# Patient Record
Sex: FEMALE | Race: BLACK OR AFRICAN AMERICAN | Employment: OTHER | URBAN - METROPOLITAN AREA
[De-identification: names, ages, dates, MRNs, and addresses within clinical notes are randomized per-mention and may not be internally consistent; named-entity substitution may affect disease eponyms.]

---

## 2017-01-23 ENCOUNTER — OFFICE VISIT (OUTPATIENT)
Dept: INTERNAL MEDICINE CLINIC | Age: 62
End: 2017-01-23

## 2017-01-23 VITALS
HEIGHT: 60 IN | HEART RATE: 55 BPM | DIASTOLIC BLOOD PRESSURE: 79 MMHG | SYSTOLIC BLOOD PRESSURE: 128 MMHG | BODY MASS INDEX: 37.54 KG/M2 | WEIGHT: 191.2 LBS | TEMPERATURE: 98.1 F | OXYGEN SATURATION: 93 %

## 2017-01-23 DIAGNOSIS — E66.01 MORBID OBESITY DUE TO EXCESS CALORIES (HCC): ICD-10-CM

## 2017-01-23 DIAGNOSIS — R91.1 PULMONARY NODULE, RIGHT: ICD-10-CM

## 2017-01-23 DIAGNOSIS — B18.2 CHRONIC HEPATITIS C WITHOUT HEPATIC COMA (HCC): ICD-10-CM

## 2017-01-23 DIAGNOSIS — I10 ESSENTIAL HYPERTENSION: Primary | ICD-10-CM

## 2017-01-23 RX ORDER — MOMETASONE FUROATE 50 UG/1
2 SPRAY, METERED NASAL DAILY
Qty: 1 CONTAINER | Refills: 11 | Status: SHIPPED | OUTPATIENT
Start: 2017-01-23 | End: 2019-12-05 | Stop reason: CLARIF

## 2017-01-23 RX ORDER — BISMUTH SUBSALICYLATE 262 MG
1 TABLET,CHEWABLE ORAL DAILY
COMMUNITY

## 2017-01-23 NOTE — PROGRESS NOTES
SPORTS MEDICINE AND PRIMARY CARE  Pb Rojas MD, [address] [id number]  Phone:  [phone]  Fax: [phone]       Chief Complaint   Patient presents with    Well Woman   . SUBJECTIVE:    Raúl Orellana is a 57 y.o. female Patient returns today alert and appropriate and has the capacity to give an accurate history. She has a known history of hepatitis C, primary hypertension, obesity, prediabetes, and right pulmonary nodules. Gynecologist Dr. Edith Espinoza performed a pap smear back in June. She currently complains of some hoarseness which she has had since Friday. She had a cold. 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 Current Outpatient Prescriptions   Medication Sig Dispense Refill    CYANOCOBALAMIN, VITAMIN B-12, (VITAMIN B-12 PO) Take 1 Tab by mouth daily.  multivitamin (ONE A DAY) tablet Take 1 Tab by mouth daily.  VITAMIN A PO Take 1 Tab by mouth daily.  mometasone (NASONEX) 50 mcg/actuation nasal spray 2 Sprays by Both Nostrils route daily. 1 Container 11    Hydrochlorothiazide (HYDRODIURIL) 12.5 mg tablet TAKE 1 TABLET BY MOUTH DAILY 30 Tab 11    ergocalciferol (ERGOCALCIFEROL) 50,000 unit capsule Take 1 Cap by mouth every seven (7) days.  8 Cap 0     Past Medical History   Diagnosis Date    H/O gastric bypass 2003     md roshni    Hepatitis C      rx ended 2008,2014 viral load non detected, syl luna md    Hypertension     Normal cardiac stress test 6-2-06    Obesity     Plantar fasciitis of right foot     Prediabetes     Pulmonary nodule, right 11-22-15  /  6-2-16     7mm  - repeat 6 mo  stable repeat 6/2/17    S/P colonoscopy 2010     Past Surgical History   Procedure Laterality Date    Hx colonoscopy      Hx gyn       Allergies   Allergen Reactions    Codeine Hives         REVIEW OF SYSTEMS:  General: negative for - chills or fever  ENT: negative for - headaches, nasal congestion or tinnitus  Respiratory: negative for - cough, hemoptysis, shortness of breath or wheezing  Cardiovascular : negative for - chest pain, edema, palpitations or shortness of breath  Gastrointestinal: negative for - abdominal pain, blood in stools, heartburn or nausea/vomiting  Genito-Urinary: no dysuria, trouble voiding, or hematuria  Musculoskeletal: negative for - gait disturbance, joint pain, joint stiffness or joint swelling  Neurological: no TIA or stroke symptoms  Hematologic: no bruises, no bleeding, no swollen glands  Integument: no lumps, mole changes, nail changes or rash  Endocrine: no malaise/lethargy or unexpected weight changes      Social History     Social History    Marital status:      Spouse name: N/A    Number of children: N/A    Years of education: N/A     Social History Main Topics    Smoking status: Never Smoker    Smokeless tobacco: Never Used    Alcohol use 1.0 oz/week     2 Glasses of wine per week    Drug use: No    Sexual activity: Yes     Partners: Male     Other Topics Concern    None     Social History Narrative    Medical History: hepatitis C - how urbano luna mdUpper pole renal cystGastroesophageal reflux diseaseVenous stasis ulcersHistory of phlebitisJoe    joint disease kneeShe had endometriosisObesitynegative stress Cardiolite 2006 ejection fraction 62%Family history ovarian    cancerSummer 2013 ophthalmological evaluation    Gyn History: Last pap date 2014. Surgical History: arthroscopic long limb gastric bypass w ith Tom-en-Y gastrojejunostomy md bryn 2003colonoscopy     Hospitalization/Major Diagnostic Procedure: Denies Past Hospitalization    Family History: Mother:  79 yrs Ovarian cancer w ith metastatic disease Father: alive Sister(s): alive Brother(s): alive Daughter(s):    alive Son(s): alive 3 brother(s) , 1 sister(s) . 1 son(s) , 1 daughter(s) .     Social History: Alcohol Use Patient does not use alcohol. Smoking Status Patient is a never smoker. Marital Status: W idow , W idow . Lives    w ith: alone. Occupation/W ork: employed full time teacher. Education/School: has highschool diploma, has college diploma [employer]. Family History   Problem Relation Age of Onset    Cancer Mother        OBJECTIVE:    Visit Vitals    /79 (BP 1 Location: Left arm, BP Patient Position: Sitting)  Comment (BP 1 Location): patient requested that bp be taken at wrist.    Pulse (!) 55    Temp 98.1 °F (36.7 °C) (Oral)    Ht 5' (1.524 m)    Wt 191 lb 3.2 oz (86.7 kg)    SpO2 93%    BMI 37.34 kg/m2     CONSTITUTIONAL: well , well nourished, appears age appropriate  EYES: perrla, eom intact  ENMT:moist mucous membranes, pharynx clear  NECK: supple. Thyroid normal  RESPIRATORY: Chest: clear bilaterally   CARDIOVASCULAR: Heart: regular rate and rhythm  GASTROINTESTINAL: Abdomen: soft, bowel sounds active  HEMATOLOGIC: no pathological lymph nodes palpated  MUSCULOSKELETAL: Extremities: no edema, pulse 1+   INTEGUMENT: No unusual rashes or suspicious skin lesions noted. Nails appear normal.  NEUROLOGIC: non-focal exam   MENTAL STATUS: alert and oriented, appropriate affect           ASSESSMENT:  1. Essential hypertension    2. Chronic hepatitis C without hepatic coma ([address]    3. Morbid obesity due to excess calories ([address]    4. Pulmonary nodule, right      From a medical perspective her wellness exam is stable. Some items we need to tidy up however. For the hoarseness we give her a steroid nasal spray which should allow the inflammation to quiet down. If she develops mucopurulent drainage she will contact us. Blood pressure control is at goal and we are very pleased with that result. Her BMI is 37.34 which represents morbid obesity and more importantly represents a nine pound weight loss.   We encourage her to continue her current weight loss program.  To that end we encourage her to do 30 minutes of physical activity five days a week or more. She had an episode of right upper quadrant abdominal discomfort. When she had her surgical procedure performed she did not have her gallbladder removed. It was a very uncomfortable feeling. She had a CT scan of her abdomen in 2015 that was notable for the nodule that were found. Comments regarding the gallbladder revealed no calcified gallstones. The ultrasound of the abdomen in June of 2015 was unremarkable and she had abdominal pain at that time and specifically there were no gallstones and normal gallbladder and wall thickening. There was no tenderness on palpation. I do not think it would be worthwhile to repeat the episode at this point and we just continue to observe. This may have been some scar tissue that caused her to have the discomfort. Advanced care planning is discussed and noted above. Appropriate laboratory studies will be requested and will be sent to her in the mail. PLAN:  .  Orders Placed This Encounter    CT CHEST WO CONT    MIGUEL MAMMO BI SCREENING INCL CAD    CBC WITH AUTOMATED DIFF    METABOLIC PANEL, COMPREHENSIVE    URINALYSIS W/ RFLX MICROSCOPIC    LIPID PANEL    TSH 3RD GENERATION    HEMOGLOBIN A1C WITH EAG    OCCULT BLOOD, IMMUNOASSAY (FIT)    HIV 1/2 AG/AB, 4TH GENERATION,W RFLX CONFIRM    HEPATITIS C QT BY PCR WITH REFLEX GENOTYPE    CYANOCOBALAMIN, VITAMIN B-12, (VITAMIN B-12 PO)    multivitamin (ONE A DAY) tablet    VITAMIN A PO    mometasone (NASONEX) 50 mcg/actuation nasal spray       Follow-up Disposition:  Return in about 6 months (around 7/23/2017). ATTENTION:   This medical record was transcribed using an electronic medical records system. Although proofread, it may and can contain electronic and spelling errors. Other human spelling and other errors may be present. Corrections may be executed at a later time.   Please feel free to contact us for any clarifications as needed.

## 2017-01-23 NOTE — PROGRESS NOTES
Chief Complaint   Patient presents with    Well Woman     1. Have you been to the ER, urgent care clinic since your last visit? Hospitalized since your last visit? No    2. Have you seen or consulted any other health care providers outside of the [address] [place] [address] since your last visit? Include any pap smears or colon screening.  No

## 2017-01-23 NOTE — MR AVS SNAPSHOT
Visit Information Date & Time Provider Department Dept. Phone Encounter #  
 1/23/2017  3:45 PM Marlene Rojo MD [facility] Medicine and Primary Care [phone] [id number] Follow-up Instructions Return in about 6 months (around 7/23/2017). Follow-up and Disposition History Your Appointments 7/10/2017  4:15 PM  
Any with Marlene Rojo MD  
[address] [place] [address] and Primary Care [facility] Appt Note: 6 month visit  
 Justin Tobias [address] [facility]  
  
   
 Justin [address]ias [address] [id number] Upcoming Health Maintenance Date Due Pneumococcal 19-64 Medium Risk (1 of 1 - PPSV23) 10/25/1973 DTaP/Tdap/Td series (1 - Tdap) 10/25/1975 INFLUENZA AGE 9 TO ADULT 8/1/2016 FOBT Q 1 YEAR AGE 50-75 12/1/2016 BREAST CANCER SCRN MAMMOGRAM 2/1/2018 PAP AKA CERVICAL CYTOLOGY 12/1/2018 Allergies as of 1/23/2017  Review Complete On: 1/23/2017 By: Marlene Rojo MD  
  
 Severity Noted Reaction Type Reactions Codeine  12/23/2014   Side Effect Hives Current Immunizations  Never Reviewed No immunizations on file. Not reviewed this visit You Were Diagnosed With   
  
 Codes Comments Essential hypertension    -  Primary ICD-10-CM: I10 
ICD-9-CM: 401.9 Chronic hepatitis C without hepatic coma (HCC)     ICD-10-CM: B18.2 ICD-9-CM: 070.54 Morbid obesity due to excess calories ([facility] [address]     ICD-10-CM: E66.01 
ICD-9-CM: 278.01   
 Pulmonary nodule, right     ICD-10-CM: R91.1 ICD-9-CM: 793.11 Vitals BP Pulse Temp Height(growth percentile) Weight(growth percentile) SpO2  
 128/79 (BP 1 Location: Left arm, BP Patient Position: Sitting) (!) 55 98.1 °F (36.7 °C) (Oral) 5' (1.524 m) 191 lb 3.2 oz (86.7 kg) 93% BMI OB Status Smoking Status 37.34 kg/m2 Postmenopausal Never Smoker Vitals History BMI and BSA Data Body Mass Index Body Surface Area 37.34 kg/m 2 1.92 m 2 [phone] Your Updated Medication List  
  
   
This list is accurate as of: 17  4:54 PM.  Always use your most recent med list.  
  
  
  
  
 ergocalciferol 50,000 unit capsule Commonly known as:  ERGOCALCIFEROL Take 1 Cap by mouth every seven (7) days. hydroCHLOROthiazide 12.5 mg tablet Commonly known as:  HYDRODIURIL  
TAKE 1 TABLET BY MOUTH DAILY  
  
 mometasone 50 mcg/actuation nasal spray Commonly known as:  NASONEX  
2 Sprays by Both Nostrils route daily. multivitamin tablet Commonly known as:  ONE A DAY Take 1 Tab by mouth daily. VITAMIN A PO Take 1 Tab by mouth daily. VITAMIN B-12 PO Take 1 Tab by mouth daily. Prescriptions Sent to Pharmacy Refills  
 mometasone (NASONEX) 50 mcg/actuation nasal spray 11 Si Sprays by Both Nostrils route daily. Class: Normal  
 Pharmacy: [facility] [address] Ph #: [phone] Route: Both Nostrils We Performed the Following CBC WITH AUTOMATED DIFF [76761 CPT(R)] HEMOGLOBIN A1C WITH EAG [07480 CPT(R)] HEPATITIS C QT BY PCR WITH REFLEX GENOTYPE [[id number] Custom] HIV 1/2 AG/AB, 4TH GENERATION,W RFLX CONFIRM [[id number] Custom] LIPID PANEL [96512 CPT(R)] METABOLIC PANEL, COMPREHENSIVE [25269 CPT(R)] OCCULT BLOOD, IMMUNOASSAY (FIT) [id number] CPT(R)] TX COLLECTION VENOUS BLOOD,VENIPUNCTURE [id number] CPT(R)] TSH 3RD GENERATION [48082 CPT(R)] URINALYSIS W/ RFLX MICROSCOPIC [41233 CPT(R)] Follow-up Instructions Return in about 6 months (around 2017). To-Do List   
 2017 Imaging:  MIGUEL MAMMO BI SCREENING INCL CAD   
  
 2017 Imaging:  CT CHEST WO CONT Introducing [facility] & HEALTH SERVICES! [facility] introduces [employer] patient portal. Now you can access parts of your medical record, email your doctor's office, and request medication refills online. 1. In your internet browser, go to [email or web address] 2. Click on the First Time User? Click Here link in the Sign In box. You will see the New Member Sign Up page. 3. Enter your [employer] Access Code exactly as it appears below. You will not need to use this code after youve completed the sign-up process. If you do not sign up before the expiration date, you must request a new code. · [employer] Access Code: [email or web address] Expires: 4/23/2017  4:53 PM 
 
4. Enter the last four digits of your Social Security Number (xxxx) and Date of Birth (mm/dd/yyyy) as indicated and click Submit. You will be taken to the next sign-up page. 5. Create a [employer] ID. This will be your [employer] login ID and cannot be changed, so think of one that is secure and easy to remember. 6. Create a [employer] password. You can change your password at any time. 7. Enter your Password Reset Question and Answer. This can be used at a later time if you forget your password. 8. Enter your e-mail address. You will receive e-mail notification when new information is available in [address] 9. Click Sign Up. You can now view and download portions of your medical record. 10. Click the Download Summary menu link to download a portable copy of your medical information. If you have questions, please visit the Frequently Asked Questions section of the [employer] website. Remember, [employer] is NOT to be used for urgent needs. For medical emergencies, dial 911. Now available from your iPhone and Android! Please provide this summary of care documentation to your next provider. Your primary care clinician is listed as Raulito Yanes. If you have any questions after today's visit, please call [phone].

## 2017-01-26 LAB
ALBUMIN SERPL-MCNC: 4.2 G/DL (ref 3.6–4.8)
ALBUMIN/GLOB SERPL: 1.4 {RATIO} (ref 1.1–2.5)
ALP SERPL-CCNC: 108 IU/L (ref 39–117)
ALT SERPL-CCNC: 9 IU/L (ref 0–32)
APPEARANCE UR: ABNORMAL
AST SERPL-CCNC: 20 IU/L (ref 0–40)
BACTERIA #/AREA URNS HPF: ABNORMAL /[HPF]
BASOPHILS # BLD AUTO: 0 X10E3/UL (ref 0–0.2)
BASOPHILS NFR BLD AUTO: 0 %
BILIRUB SERPL-MCNC: <0.2 MG/DL (ref 0–1.2)
BILIRUB UR QL STRIP: NEGATIVE
BUN SERPL-MCNC: 14 MG/DL (ref 8–27)
BUN/CREAT SERPL: 18 (ref 11–26)
CALCIUM SERPL-MCNC: 9.2 MG/DL (ref 8.7–10.3)
CASTS URNS QL MICRO: ABNORMAL /LPF
CHLORIDE SERPL-SCNC: 99 MMOL/L (ref 96–106)
CHOLEST SERPL-MCNC: 181 MG/DL (ref 100–199)
CO2 SERPL-SCNC: 22 MMOL/L (ref 18–29)
COLOR UR: YELLOW
CREAT SERPL-MCNC: 0.79 MG/DL (ref 0.57–1)
CRYSTALS URNS MICRO: ABNORMAL
EOSINOPHIL # BLD AUTO: 0.1 X10E3/UL (ref 0–0.4)
EOSINOPHIL NFR BLD AUTO: 2 %
EPI CELLS #/AREA URNS HPF: ABNORMAL /HPF
ERYTHROCYTE [DISTWIDTH] IN BLOOD BY AUTOMATED COUNT: 14.9 % (ref 12.3–15.4)
EST. AVERAGE GLUCOSE BLD GHB EST-MCNC: 123 MG/DL
GLOBULIN SER CALC-MCNC: 2.9 G/DL (ref 1.5–4.5)
GLUCOSE SERPL-MCNC: 87 MG/DL (ref 65–99)
GLUCOSE UR QL: NEGATIVE
HBA1C MFR BLD: 5.9 % (ref 4.8–5.6)
HCT VFR BLD AUTO: 40.3 % (ref 34–46.6)
HCV GENOTYPE: NORMAL
HCV RNA SERPL NAA+PROBE-ACNC: NORMAL IU/ML
HCV RNA SERPL NAA+PROBE-LOG IU: NORMAL LOG10 IU/ML
HDLC SERPL-MCNC: 60 MG/DL
HGB BLD-MCNC: 12.8 G/DL (ref 11.1–15.9)
HGB UR QL STRIP: ABNORMAL
HIV 1+2 AB+HIV1 P24 AG SERPL QL IA: NON REACTIVE
IMM GRANULOCYTES # BLD: 0 X10E3/UL (ref 0–0.1)
IMM GRANULOCYTES NFR BLD: 0 %
KETONES UR QL STRIP: NEGATIVE
LDLC SERPL CALC-MCNC: 98 MG/DL (ref 0–99)
LEUKOCYTE ESTERASE UR QL STRIP: ABNORMAL
LYMPHOCYTES # BLD AUTO: 2.4 X10E3/UL (ref 0.7–3.1)
LYMPHOCYTES NFR BLD AUTO: 33 %
MCH RBC QN AUTO: 26.1 PG (ref 26.6–33)
MCHC RBC AUTO-ENTMCNC: 31.8 G/DL (ref 31.5–35.7)
MCV RBC AUTO: 82 FL (ref 79–97)
MICRO URNS: ABNORMAL
MONOCYTES # BLD AUTO: 0.4 X10E3/UL (ref 0.1–0.9)
MONOCYTES NFR BLD AUTO: 6 %
MUCOUS THREADS URNS QL MICRO: PRESENT
NEUTROPHILS # BLD AUTO: 4.4 X10E3/UL (ref 1.4–7)
NEUTROPHILS NFR BLD AUTO: 59 %
NITRITE UR QL STRIP: NEGATIVE
PH UR STRIP: 5 [PH] (ref 5–7.5)
PLATELET # BLD AUTO: 279 X10E3/UL (ref 150–379)
POTASSIUM SERPL-SCNC: 4.5 MMOL/L (ref 3.5–5.2)
PROT SERPL-MCNC: 7.1 G/DL (ref 6–8.5)
PROT UR QL STRIP: NEGATIVE
RBC # BLD AUTO: 4.9 X10E6/UL (ref 3.77–5.28)
RBC #/AREA URNS HPF: ABNORMAL /HPF
SODIUM SERPL-SCNC: 141 MMOL/L (ref 134–144)
SP GR UR: 1.03 (ref 1–1.03)
TEST INFORMATION: NORMAL
TRIGL SERPL-MCNC: 115 MG/DL (ref 0–149)
TSH SERPL DL<=0.005 MIU/L-ACNC: 1.8 UIU/ML (ref 0.45–4.5)
UNIDENT CRYS URNS QL MICRO: PRESENT
UROBILINOGEN UR STRIP-MCNC: 0.2 MG/DL (ref 0.2–1)
VLDLC SERPL CALC-MCNC: 23 MG/DL (ref 5–40)
WBC # BLD AUTO: 7.4 X10E3/UL (ref 3.4–10.8)
WBC #/AREA URNS HPF: ABNORMAL /HPF

## 2017-02-17 RX ORDER — HYDROCHLOROTHIAZIDE 12.5 MG/1
TABLET ORAL
Qty: 30 TAB | Refills: 0 | Status: SHIPPED | OUTPATIENT
Start: 2017-02-17 | End: 2017-03-16 | Stop reason: SDUPTHER

## 2017-02-22 ENCOUNTER — HOSPITAL ENCOUNTER (OUTPATIENT)
Dept: MAMMOGRAPHY | Age: 62
Discharge: HOME | End: 2017-02-22
Attending: INTERNAL MEDICINE
Payer: COMMERCIAL

## 2017-02-22 DIAGNOSIS — I10 ESSENTIAL HYPERTENSION: ICD-10-CM

## 2017-02-22 PROCEDURE — 77067 SCR MAMMO BI INCL CAD: CPT

## 2017-03-16 RX ORDER — HYDROCHLOROTHIAZIDE 12.5 MG/1
TABLET ORAL
Qty: 30 TAB | Refills: 0 | Status: SHIPPED | OUTPATIENT
Start: 2017-03-16 | End: 2017-06-11 | Stop reason: SDUPTHER

## 2017-05-04 ENCOUNTER — LAB ONLY (OUTPATIENT)
Dept: INTERNAL MEDICINE CLINIC | Age: 62
End: 2017-05-04

## 2017-05-04 DIAGNOSIS — R53.83 FATIGUE, UNSPECIFIED TYPE: Primary | ICD-10-CM

## 2017-05-06 LAB — PTH-INTACT SERPL-MCNC: 74 PG/ML (ref 15–65)

## 2017-05-08 LAB
25(OH)D3+25(OH)D2 SERPL-MCNC: 15.4 NG/ML (ref 30–100)
ALBUMIN SERPL-MCNC: 4 G/DL (ref 3.6–4.8)
ALBUMIN/GLOB SERPL: 1.5 {RATIO} (ref 1.2–2.2)
ALP SERPL-CCNC: 98 IU/L (ref 39–117)
ALT SERPL-CCNC: 11 IU/L (ref 0–32)
AST SERPL-CCNC: 18 IU/L (ref 0–40)
BASOPHILS # BLD AUTO: 0 X10E3/UL (ref 0–0.2)
BASOPHILS NFR BLD AUTO: 0 %
BILIRUB SERPL-MCNC: <0.2 MG/DL (ref 0–1.2)
BUN SERPL-MCNC: 19 MG/DL (ref 8–27)
BUN/CREAT SERPL: 19 (ref 12–28)
CALCIUM SERPL-MCNC: 9.3 MG/DL (ref 8.7–10.3)
CHLORIDE SERPL-SCNC: 103 MMOL/L (ref 96–106)
CO2 SERPL-SCNC: 25 MMOL/L (ref 18–29)
CREAT SERPL-MCNC: 1.02 MG/DL (ref 0.57–1)
EOSINOPHIL # BLD AUTO: 0.1 X10E3/UL (ref 0–0.4)
EOSINOPHIL NFR BLD AUTO: 2 %
ERYTHROCYTE [DISTWIDTH] IN BLOOD BY AUTOMATED COUNT: 15.3 % (ref 12.3–15.4)
EST. AVERAGE GLUCOSE BLD GHB EST-MCNC: 120 MG/DL
FERRITIN SERPL-MCNC: 23 NG/ML (ref 15–150)
FOLATE SERPL-MCNC: 12.9 NG/ML
GLOBULIN SER CALC-MCNC: 2.6 G/DL (ref 1.5–4.5)
GLUCOSE SERPL-MCNC: 90 MG/DL (ref 65–99)
H PYLORI IGM SER-ACNC: <9 UNITS (ref 0–8.9)
HBA1C MFR BLD: 5.8 % (ref 4.8–5.6)
HCT VFR BLD AUTO: 39.6 % (ref 34–46.6)
HGB BLD-MCNC: 12.7 G/DL (ref 11.1–15.9)
IMM GRANULOCYTES # BLD: 0 X10E3/UL (ref 0–0.1)
IMM GRANULOCYTES NFR BLD: 0 %
IRON SATN MFR SERPL: 11 % (ref 15–55)
IRON SERPL-MCNC: 39 UG/DL (ref 27–139)
LYMPHOCYTES # BLD AUTO: 2.6 X10E3/UL (ref 0.7–3.1)
LYMPHOCYTES NFR BLD AUTO: 39 %
MCH RBC QN AUTO: 25.7 PG (ref 26.6–33)
MCHC RBC AUTO-ENTMCNC: 32.1 G/DL (ref 31.5–35.7)
MCV RBC AUTO: 80 FL (ref 79–97)
MONOCYTES # BLD AUTO: 0.4 X10E3/UL (ref 0.1–0.9)
MONOCYTES NFR BLD AUTO: 5 %
NEUTROPHILS # BLD AUTO: 3.5 X10E3/UL (ref 1.4–7)
NEUTROPHILS NFR BLD AUTO: 54 %
PLATELET # BLD AUTO: 246 X10E3/UL (ref 150–379)
POTASSIUM SERPL-SCNC: 4.3 MMOL/L (ref 3.5–5.2)
PREALB SERPL-MCNC: 20 MG/DL (ref 10–36)
PROT SERPL-MCNC: 6.6 G/DL (ref 6–8.5)
RBC # BLD AUTO: 4.94 X10E6/UL (ref 3.77–5.28)
SODIUM SERPL-SCNC: 142 MMOL/L (ref 134–144)
TIBC SERPL-MCNC: 347 UG/DL (ref 250–450)
TSH SERPL DL<=0.005 MIU/L-ACNC: 1.41 UIU/ML (ref 0.45–4.5)
UIBC SERPL-MCNC: 308 UG/DL (ref 118–369)
VIT B12 SERPL-MCNC: 349 PG/ML (ref 211–946)
WBC # BLD AUTO: 6.6 X10E3/UL (ref 3.4–10.8)

## 2017-05-08 RX ORDER — ERGOCALCIFEROL 1.25 MG/1
50000 CAPSULE ORAL
Qty: 8 CAP | Refills: 1 | Status: SHIPPED | OUTPATIENT
Start: 2017-05-08 | End: 2018-07-10 | Stop reason: ALTCHOICE

## 2017-07-10 ENCOUNTER — OFFICE VISIT (OUTPATIENT)
Dept: INTERNAL MEDICINE CLINIC | Age: 62
End: 2017-07-10

## 2017-07-10 VITALS
DIASTOLIC BLOOD PRESSURE: 92 MMHG | OXYGEN SATURATION: 99 % | HEIGHT: 60 IN | BODY MASS INDEX: 37.93 KG/M2 | HEART RATE: 50 BPM | TEMPERATURE: 96.3 F | WEIGHT: 193.2 LBS | SYSTOLIC BLOOD PRESSURE: 130 MMHG | RESPIRATION RATE: 16 BRPM

## 2017-07-10 DIAGNOSIS — N28.1 RENAL CYST, RIGHT: ICD-10-CM

## 2017-07-10 DIAGNOSIS — R91.1 PULMONARY NODULE, RIGHT: ICD-10-CM

## 2017-07-10 DIAGNOSIS — B18.2 CHRONIC HEPATITIS C WITHOUT HEPATIC COMA (HCC): ICD-10-CM

## 2017-07-10 DIAGNOSIS — R73.03 PREDIABETES: ICD-10-CM

## 2017-07-10 DIAGNOSIS — I10 ESSENTIAL HYPERTENSION: Primary | ICD-10-CM

## 2017-07-10 DIAGNOSIS — Z98.84 H/O GASTRIC BYPASS: ICD-10-CM

## 2017-07-10 NOTE — MR AVS SNAPSHOT
Visit Information Date & Time Provider Department Dept. Phone Encounter #  
 7/10/2017  4:15 PM Rhoda Castillo [facility] Sports Medicine and Primary Care [phone] [id number] Follow-up Instructions Return in about 6 months (around 1/10/2018). Follow-up and Disposition History Upcoming Health Maintenance Date Due Pneumococcal 19-64 Medium Risk (1 of 1 - PPSV23) 10/25/1973 DTaP/Tdap/Td series (1 - Tdap) 10/25/1975 FOBT Q 1 YEAR AGE 50-75 12/1/2016 INFLUENZA AGE 9 TO ADULT 8/1/2017 PAP AKA CERVICAL CYTOLOGY 12/1/2018 BREAST CANCER SCRN MAMMOGRAM 2/22/2019 Allergies as of 7/10/2017  Review Complete On: 7/10/2017 By: Tuan Pete MD  
  
 Severity Noted Reaction Type Reactions Codeine  12/23/2014   Side Effect Hives Current Immunizations  Never Reviewed No immunizations on file. Not reviewed this visit You Were Diagnosed With   
  
 Codes Comments Essential hypertension    -  Primary ICD-10-CM: I10 
ICD-9-CM: 401.9 Chronic hepatitis C without hepatic coma (HCC)     ICD-10-CM: B18.2 ICD-9-CM: 070.54 Pulmonary nodule, right     ICD-10-CM: R91.1 ICD-9-CM: 793.11   
 H/O gastric bypass     ICD-10-CM: Z98.890 ICD-9-CM: V45.86 Prediabetes     ICD-10-CM: R73.03 
ICD-9-CM: 790.29 Renal cyst, right     ICD-10-CM: N28.1 ICD-9-CM: 753.10 Vitals BP Pulse Temp Resp Height(growth percentile) Weight(growth percentile) (!) 130/92 (BP 1 Location: Left arm, BP Patient Position: Sitting) (!) 50 96.3 °F (35.7 °C) (Oral) 16 5' (1.524 m) 193 lb 3.2 oz (87.6 kg) SpO2 BMI OB Status Smoking Status 99% 37.73 kg/m2 Postmenopausal Never Smoker BMI and BSA Data Body Mass Index Body Surface Area  
 37.73 kg/m 2 1.93 m 2 Preferred Pharmacy Pharmacy Name Phone [facility] [phone] [place] [address] [phone] Your Updated Medication List  
  
   
This list is accurate as of: 7/10/17  4:53 PM.  Always use your most recent med list.  
  
  
  
  
 ergocalciferol 50,000 unit capsule Commonly known as:  ERGOCALCIFEROL Take 1 Cap by mouth every seven (7) days. hydroCHLOROthiazide 12.5 mg tablet Commonly known as:  HYDRODIURIL  
TAKE 1 TABLET BY MOUTH EVERY DAY  
  
 mometasone 50 mcg/actuation nasal spray Commonly known as:  NASONEX  
2 Sprays by Both Nostrils route daily. multivitamin tablet Commonly known as:  ONE A DAY Take 1 Tab by mouth daily. varicella zoster vacine live 19,400 unit/0.65 mL Susr injection Commonly known as:  ZOSTAVAX  
1 Vial by SubCUTAneous route once for 1 dose. VITAMIN A PO Take 1 Tab by mouth daily. VITAMIN B-12 PO Take 1 Tab by mouth daily. Prescriptions Sent to Pharmacy Refills  
 varicella zoster vacine live (ZOSTAVAX) 19,400 unit/0.65 mL susr injection 0 Si Vial by SubCUTAneous route once for 1 dose. Class: Normal  
 Pharmacy: [facility] [address] #: [phone] Route: SubCUTAneous Follow-up Instructions Return in about 6 months (around 1/10/2018). To-Do List   
 07/10/2017 Imaging:  CT CHEST WO CONT   
  
 07/10/2017 Imaging:  US RETROPERITONEUM COMP Introducing [facility] & HEALTH SERVICES! [facility] introduces [email or web address] patient portal. Now you can access parts of your medical record, email your doctor's office, and request medication refills online. 1. In your internet browser, go to [email or web address] 2. Click on the First Time User? Click Here link in the Sign In box. You will see the New Member Sign Up page. 3. Enter your [email or web address] Access Code exactly as it appears below. You will not need to use this code after youve completed the sign-up process.  If you do not sign up before the expiration date, you must request a new code. · [employer] Access Code: [email or web address] Expires: 10/8/2017  4:53 PM 
 
4. Enter the last four digits of your Social Security Number (xxxx) and Date of Birth (mm/dd/yyyy) as indicated and click Submit. You will be taken to the next sign-up page. 5. Create a [employer] ID. This will be your [employer] login ID and cannot be changed, so think of one that is secure and easy to remember. 6. Create a [employer] password. You can change your password at any time. 7. Enter your Password Reset Question and Answer. This can be used at a later time if you forget your password. 8. Enter your e-mail address. You will receive e-mail notification when new information is available in [address] 9. Click Sign Up. You can now view and download portions of your medical record. 10. Click the Download Summary menu link to download a portable copy of your medical information. If you have questions, please visit the Frequently Asked Questions section of the [employer] website. Remember, [employer] is NOT to be used for urgent needs. For medical emergencies, dial 911. Now available from your iPhone and Android! Please provide this summary of care documentation to your next provider. Your primary care clinician is listed as Hazel Parnell. If you have any questions after today's visit, please call [phone].

## 2017-07-10 NOTE — PROGRESS NOTES
1. Have you been to the ER, urgent care clinic since your last visit? Hospitalized since your last visit? No    2. Have you seen or consulted any other health care providers outside of the [address] [place] [address] since your last visit? Include any pap smears or colon screening.  No

## 2017-07-10 NOTE — PROGRESS NOTES
SPORTS MEDICINE AND PRIMARY CARE  Richard Sethi MD, [address] [facility],[address] [address]  Phone:  [phone]  Fax: [phone]       Chief Complaint   Patient presents with    Follow-up     6 month visit    . SUBJECTIVE:    Jeff Shane is a 57 y.o. female Patient returns today ambulatory, alert and appropriate and has the capacity to give an accurate history. She has a known history of hepatitis C treated with nondetectable viral load 2014, primary hypertension, status-post Tom-en-Y gastric bypass 2003 by Ceci Lauren MD and stable right pulmonary nodule. CT scan on 6/216 suggested a repeat CT on 12/1. Patient has no new complaints. She wants her wellness form completed when she has it available. She would also like the status of her renal cyst as well as the pulmonary nodules. Patient is seen for evaluation. Current Outpatient Prescriptions   Medication Sig Dispense Refill    varicella zoster vacine live (ZOSTAVAX) 19,400 unit/0.65 mL susr injection 1 Vial by SubCUTAneous route once for 1 dose. 0.65 mL 0    hydroCHLOROthiazide (HYDRODIURIL) 12.5 mg tablet TAKE 1 TABLET BY MOUTH EVERY DAY 90 Tab 11    ergocalciferol (ERGOCALCIFEROL) 50,000 unit capsule Take 1 Cap by mouth every seven (7) days. 8 Cap 1    CYANOCOBALAMIN, VITAMIN B-12, (VITAMIN B-12 PO) Take 1 Tab by mouth daily.  multivitamin (ONE A DAY) tablet Take 1 Tab by mouth daily.  VITAMIN A PO Take 1 Tab by mouth daily.  mometasone (NASONEX) 50 mcg/actuation nasal spray 2 Sprays by Both Nostrils route daily.  1 Container 11     Past Medical History:   Diagnosis Date    H/O gastric bypass 2003    md roshni    Hepatitis C     rx ended 2008,2014 viral load non detected, syl luna md    Hypertension     Normal cardiac stress test 6-2-06    Obesity     Plantar fasciitis of right foot     Prediabetes     Pulmonary nodule, right 11-22-15  /  6-2-16    7mm  - repeat 6 mo stable repeat 6/2/17    Renal cyst, right     S/P colonoscopy 2010     Past Surgical History:   Procedure Laterality Date    HX COLONOSCOPY      HX GYN       Allergies   Allergen Reactions    Codeine Hives         REVIEW OF SYSTEMS:  General: negative for - chills or fever  ENT: negative for - headaches, nasal congestion or tinnitus  Respiratory: negative for - cough, hemoptysis, shortness of breath or wheezing  Cardiovascular : negative for - chest pain, edema, palpitations or shortness of breath  Gastrointestinal: negative for - abdominal pain, blood in stools, heartburn or nausea/vomiting  Genito-Urinary: no dysuria, trouble voiding, or hematuria  Musculoskeletal: negative for - gait disturbance, joint pain, joint stiffness or joint swelling  Neurological: no TIA or stroke symptoms  Hematologic: no bruises, no bleeding, no swollen glands  Integument: no lumps, mole changes, nail changes or rash  Endocrine: no malaise/lethargy or unexpected weight changes      Social History     Social History    Marital status:      Spouse name: N/A    Number of children: N/A    Years of education: N/A     Social History Main Topics    Smoking status: Never Smoker    Smokeless tobacco: Never Used    Alcohol use 1.0 oz/week     2 Glasses of wine per week    Drug use: No    Sexual activity: Yes     Partners: Male     Other Topics Concern    None     Social History Narrative    Medical History: hepatitis C - how urbano luna mdUpper pole renal cystGastroesophageal reflux diseaseVenous stasis ulcersHistory of phlebitisJoe    joint disease kneeShe had endometriosisObesitynegative stress Cardiolite June 8, 2006 ejection fraction 62%Family history ovarian    cancerSummer 2013 ophthalmological evaluation    Gyn History: Last pap date 01/13/2014.     Surgical History: arthroscopic long limb gastric bypass w ith Tom-en-Y gastrojejunostomy md bryn April 9, 2003colonoscopy 2010    Hospitalization/Major Diagnostic Procedure: Denies Past Hospitalization    Family History: Mother:  79 yrs Ovarian cancer w ith metastatic disease Father: alive Sister(s): alive Brother(s): alive Daughter(s):    alive Son(s): alive 3 brother(s) , 1 sister(s) . 1 son(s) , 1 daughter(s) . Social History: Alcohol Use Patient does not use alcohol. Smoking Status Patient is a never smoker. Marital Status: W idow , W idow . Lives    w ith: alone. Occupation/W ork: employed full time teacher. Education/School: has highschool diploma, has college diploma [employer]. Family History   Problem Relation Age of Onset    Cancer Mother        OBJECTIVE:    Visit Vitals    BP (!) 130/92 (BP 1 Location: Left arm, BP Patient Position: Sitting)    Pulse (!) 50    Temp 96.3 °F (35.7 °C) (Oral)    Resp 16    Ht 5' (1.524 m)    Wt 193 lb 3.2 oz (87.6 kg)    SpO2 99%    BMI 37.73 kg/m2     CONSTITUTIONAL: well , well nourished, appears age appropriate  EYES: perrla, eom intact  ENMT:moist mucous membranes, pharynx clear  NECK: supple. Thyroid normal  RESPIRATORY: Chest: clear bilaterally   CARDIOVASCULAR: Heart: regular rate and rhythm  GASTROINTESTINAL: Abdomen: soft, bowel sounds active  HEMATOLOGIC: no pathological lymph nodes palpated  MUSCULOSKELETAL: Extremities: no edema, pulse 1+   INTEGUMENT: No unusual rashes or suspicious skin lesions noted. Nails appear normal.  NEUROLOGIC: non-focal exam   MENTAL STATUS: alert and oriented, appropriate affect           ASSESSMENT:  1. Essential hypertension    2. Chronic hepatitis C without hepatic coma (HCC)    3. Pulmonary nodule, right    4. H/O gastric bypass    5. Prediabetes    6. Renal cyst, right      Patient's medical status is stable. We note a diastolic of 92 at the upper limits of normal.  In addition we note that the BMI reflects a two pound weight gain suggesting that she is trying to get back into the [employer] club.   We encourage physical activity for 30 minutes five days a week and a heart healthy, low salt diet which would allow her diastolic to return towards normal and allow her weight gain to dissipate. We will ask for a follow-up renal ultrasound to confirm stability of the cyst on her right kidney. We will ask for repeat CT scan to confirm stability of her pulmonary nodules noted previously that had been previously exhibiting benign characteristics. Patient's medical status is stable. She will return to see us in six months, sooner if she has any problems. PLAN:  .  Orders Placed This Encounter    CT CHEST WO CONT    US RETROPERITONEUM COMP    varicella zoster vacine live (ZOSTAVAX) 19,400 unit/0.65 mL susr injection       Follow-up Disposition:  Return in about 6 months (around 1/10/2018). ATTENTION:   This medical record was transcribed using an electronic medical records system. Although proofread, it may and can contain electronic and spelling errors. Other human spelling and other errors may be present. Corrections may be executed at a later time. Please feel free to contact us for any clarifications as needed.

## 2017-07-17 ENCOUNTER — HOSPITAL ENCOUNTER (OUTPATIENT)
Dept: ULTRASOUND IMAGING | Age: 62
Discharge: HOME | End: 2017-07-17
Attending: INTERNAL MEDICINE
Payer: COMMERCIAL

## 2017-07-17 ENCOUNTER — HOSPITAL ENCOUNTER (OUTPATIENT)
Dept: CT IMAGING | Age: 62
Discharge: HOME | End: 2017-07-17
Attending: INTERNAL MEDICINE
Payer: COMMERCIAL

## 2017-07-17 DIAGNOSIS — N28.1 RENAL CYST, RIGHT: ICD-10-CM

## 2017-07-17 DIAGNOSIS — R91.1 PULMONARY NODULE, RIGHT: ICD-10-CM

## 2017-07-17 PROCEDURE — 71250 CT THORAX DX C-: CPT

## 2017-07-17 PROCEDURE — 76770 US EXAM ABDO BACK WALL COMP: CPT

## 2017-07-19 PROBLEM — Z12.11 ENCOUNTER FOR HEMOCCULT SCREENING: Status: ACTIVE | Noted: 2017-07-19

## 2017-07-19 LAB
HEMOCCULT STL QL IA: NEGATIVE
PLEASE NOTE:, 188601: NORMAL

## 2018-01-09 ENCOUNTER — OFFICE VISIT (OUTPATIENT)
Dept: INTERNAL MEDICINE CLINIC | Age: 63
End: 2018-01-09

## 2018-01-09 VITALS
BODY MASS INDEX: 37.97 KG/M2 | HEIGHT: 60 IN | TEMPERATURE: 97.6 F | DIASTOLIC BLOOD PRESSURE: 72 MMHG | OXYGEN SATURATION: 98 % | RESPIRATION RATE: 16 BRPM | HEART RATE: 63 BPM | WEIGHT: 193.4 LBS | SYSTOLIC BLOOD PRESSURE: 108 MMHG

## 2018-01-09 DIAGNOSIS — Z00.00 WELLNESS EXAMINATION: Primary | ICD-10-CM

## 2018-01-09 DIAGNOSIS — I10 ESSENTIAL HYPERTENSION: ICD-10-CM

## 2018-01-09 DIAGNOSIS — R91.1 PULMONARY NODULE, RIGHT: ICD-10-CM

## 2018-01-09 DIAGNOSIS — E66.09 CLASS 2 OBESITY DUE TO EXCESS CALORIES WITHOUT SERIOUS COMORBIDITY WITH BODY MASS INDEX (BMI) OF 37.0 TO 37.9 IN ADULT: ICD-10-CM

## 2018-01-09 NOTE — MR AVS SNAPSHOT
Visit Information Date & Time Provider Department Dept. Phone Encounter #  
 1/9/2018 12:15 PM Verónica Weiss MD [facility] Medicine and Primary Care [phone] [id number] Follow-up Instructions Return in about 6 months (around 7/9/2018). Follow-up and Disposition History Your Appointments 1/15/2018  4:15 PM  
Any with Verónica Weiss MD  
[address] [place] [address] and Primary Care [address] [place] [address]) Appt Note: 6 mo f/up; 6 mo f/up Justin Tobias [address] [facility]  
  
   
 Justin Tobias [phone] Upcoming Health Maintenance Date Due FOBT Q 1 YEAR AGE 50-75 7/18/2018 PAP AKA CERVICAL CYTOLOGY 12/1/2018 BREAST CANCER SCRN MAMMOGRAM 2/22/2019 DTaP/Tdap/Td series (2 - Td) 1/9/2028 Allergies as of 1/9/2018  Review Complete On: 1/9/2018 By: Verónica Wesis MD  
  
 Severity Noted Reaction Type Reactions Codeine  12/23/2014   Side Effect Hives Current Immunizations  Never Reviewed No immunizations on file. Not reviewed this visit You Were Diagnosed With   
  
 Codes Comments Wellness examination    -  Primary ICD-10-CM: Z00.00 ICD-9-CM: V70.0 Pulmonary nodule, right     ICD-10-CM: R91.1 ICD-9-CM: 793.11 Essential hypertension     ICD-10-CM: I10 
ICD-9-CM: 401.9 Class 2 obesity due to excess calories without serious comorbidity with body mass index (BMI) of 37.0 to 37.9 in adult     ICD-10-CM: E66.09, M09.38 ICD-9-CM: 278.00, V85.37 Vitals BP Pulse Temp Resp Height(growth percentile) Weight(growth percentile) 108/72 63 97.6 °F (36.4 °C) (Oral) 16 5' (1.524 m) 193 lb 6.4 oz (87.7 kg) SpO2 BMI OB Status Smoking Status 98% 37.77 kg/m2 Postmenopausal Never Smoker Vitals History BMI and BSA Data Body Mass Index Body Surface Area  
 37.77 kg/m 2 1.93 m 2 Preferred Pharmacy Pharmacy Name Phone [facility] [phone] [facility] Eileen Ellison2 [phone] Your Updated Medication List  
  
   
This list is accurate as of: 1/9/18  2:17 PM.  Always use your most recent med list.  
  
  
  
  
 ergocalciferol 50,000 unit capsule Commonly known as:  ERGOCALCIFEROL Take 1 Cap by mouth every seven (7) days. hydroCHLOROthiazide 12.5 mg tablet Commonly known as:  HYDRODIURIL  
TAKE 1 TABLET BY MOUTH EVERY DAY  
  
 mometasone 50 mcg/actuation nasal spray Commonly known as:  NASONEX  
2 Sprays by Both Nostrils route daily. multivitamin tablet Commonly known as:  ONE A DAY Take 1 Tab by mouth daily. VITAMIN A PO Take 1 Tab by mouth daily. VITAMIN B-12 PO Take 1 Tab by mouth daily. We Performed the Following CBC WITH AUTOMATED DIFF [71330 CPT(R)] HEMOGLOBIN A1C WITH EAG [33331 CPT(R)] LIPID PANEL [93237 CPT(R)] METABOLIC PANEL, COMPREHENSIVE [20268 CPT(R)] MA COLLECTION VENOUS BLOOD,VENIPUNCTURE I3400057 CPT(R)] TSH 3RD GENERATION [64811 CPT(R)] URINALYSIS W/ RFLX MICROSCOPIC [50789 CPT(R)] VITAMIN B12 & FOLATE [83347 CPT(R)] VITAMIN D, 1, 25 DIHYDROXY [68002 CPT(R)] Follow-up Instructions Return in about 6 months (around 7/9/2018). To-Do List   
 07/17/2018 Imaging:  CT CHEST WO CONT Introducing [facility] & HEALTH SERVICES! Melissa Schneider introduces eLifestyles patient portal. Now you can access parts of your medical record, email your doctor's office, and request medication refills online. 1. In your internet browser, go to [email or web address] 2. Click on the First Time User? Click Here link in the Sign In box. You will see the New Member Sign Up page. 3. Enter your eLifestyles Access Code exactly as it appears below. You will not need to use this code after youve completed the sign-up process.  If you do not sign up before the expiration date, you must request a new code. · Dynadmic Access Code: [email or web address] Shantanu Seymour Expires: 4/9/2018  2:17 PM 
 
4. Enter the last four digits of your Social Security Number (xxxx) and Date of Birth (mm/dd/yyyy) as indicated and click Submit. You will be taken to the next sign-up page. 5. Create a Dynadmic ID. This will be your Dynadmic login ID and cannot be changed, so think of one that is secure and easy to remember. 6. Create a Dynadmic password. You can change your password at any time. 7. Enter your Password Reset Question and Answer. This can be used at a later time if you forget your password. 8. Enter your e-mail address. You will receive e-mail notification when new information is available in [address] 9. Click Sign Up. You can now view and download portions of your medical record. 10. Click the Download Summary menu link to download a portable copy of your medical information. If you have questions, please visit the Frequently Asked Questions section of the Dynadmic website. Remember, Dynadmic is NOT to be used for urgent needs. For medical emergencies, dial 911. Now available from your iPhone and Android! Please provide this summary of care documentation to your next provider. Your primary care clinician is listed as Sofy Pace. If you have any questions after today's visit, please call [phone].

## 2018-01-09 NOTE — PROGRESS NOTES
1. Have you been to the ER, urgent care clinic since your last visit? Hospitalized since your last visit? Yes When: 12-31-17 Reason for visit: cold in chest    2. Have you seen or consulted any other health care providers outside of the [address] [place] [address] since your last visit? Include any pap smears or colon screening.  Yes [facility]     [facility]

## 2018-01-09 NOTE — PROGRESS NOTES
SPORTS MEDICINE AND PRIMARY CARE  Leonetta Heimlich, MD, [address] [address]  Phone:  [phone]  Fax: [phone]       Chief Complaint   Patient presents with    Annual Exam   .      SUBJECTIVE:    Pamela Corona is a 60 y.o. female Patient returns today with known history of treated hepatitis C, primary hypertension, obesity, prediabetes, and is seen for evaluation. Patient returns today she is feeling very sick. She was so sick she went to [facility] and was seen by Kamini Garg DO, and was given Albuterol and methylprednisolone 21 tablet dose pack. She had cough, congestion, nausea, vomiting, which is only starting to improve yesterday. She still has the cough currently. She is starting to feel better now. So patient comes in primarily for a wellness visit today, which will be completed. Current Outpatient Prescriptions   Medication Sig Dispense Refill    hydroCHLOROthiazide (HYDRODIURIL) 12.5 mg tablet TAKE 1 TABLET BY MOUTH EVERY DAY 90 Tab 11    CYANOCOBALAMIN, VITAMIN B-12, (VITAMIN B-12 PO) Take 1 Tab by mouth daily.  multivitamin (ONE A DAY) tablet Take 1 Tab by mouth daily.  VITAMIN A PO Take 1 Tab by mouth daily.  ergocalciferol (ERGOCALCIFEROL) 50,000 unit capsule Take 1 Cap by mouth every seven (7) days. 8 Cap 1    mometasone (NASONEX) 50 mcg/actuation nasal spray 2 Sprays by Both Nostrils route daily.  1 Container 11     Past Medical History:   Diagnosis Date    Encounter for Hemoccult screening 07/19/2017    neg    H/O gastric bypass 2003    md roshni    Hepatitis C     rx ended 2008,2014 viral load non detected, syl luna md    Hypertension     Normal cardiac stress test 6-2-06    Obesity     Plantar fasciitis of right foot     Prediabetes     Pulmonary nodule, right 11-22-15  /  6-2-16    7mm  - repeat 6 mo  stable repeat 6/2/17    Renal cyst, right     S/P colonoscopy 2010    Wellness examination 01/09/2018     Past Surgical History:   Procedure Laterality Date    HX COLONOSCOPY      HX GYN       Allergies   Allergen Reactions    Codeine Hives         REVIEW OF SYSTEMS:  General: negative for - chills or fever  ENT: negative for - headaches, nasal congestion or tinnitus  Respiratory: negative for - cough, hemoptysis, shortness of breath or wheezing  Cardiovascular : negative for - chest pain, edema, palpitations or shortness of breath  Gastrointestinal: negative for - abdominal pain, blood in stools, heartburn or nausea/vomiting  Genito-Urinary: no dysuria, trouble voiding, or hematuria  Musculoskeletal: negative for - gait disturbance, joint pain, joint stiffness or joint swelling  Neurological: no TIA or stroke symptoms  Hematologic: no bruises, no bleeding, no swollen glands  Integument: no lumps, mole changes, nail changes or rash  Endocrine: no malaise/lethargy or unexpected weight changes      Social History     Social History    Marital status:      Spouse name: N/A    Number of children: N/A    Years of education: N/A     Social History Main Topics    Smoking status: Never Smoker    Smokeless tobacco: Never Used    Alcohol use 1.0 oz/week     2 Glasses of wine per week      Comment: occasional    Drug use: No    Sexual activity: Yes     Partners: Male     Other Topics Concern    None     Social History Narrative    Medical History: hepatitis C - how urbano luna mdUpper pole renal cystGastroesophageal reflux diseaseVenous stasis ulcersHistory of phlebitisJoe    joint disease kneeShe had endometriosisObesitynegative stress Cardiolite June 8, 2006 ejection fraction 62%Family history ovarian    cancerSummer 2013 ophthalmological evaluation    Gyn History: Last pap date 01/13/2014.     Surgical History: arthroscopic long limb gastric bypass w ith Tom-en-Y gastrojejunostomy md bryn April 9, 2003colonoscopy 2010    Hospitalization/Major Diagnostic Procedure: Denies Past Hospitalization Family History: Mother:  79 yrs Ovarian cancer w ith metastatic disease Father: alive Sister(s): alive Brother(s): alive Daughter(s):    alive Son(s): alive 3 brother(s) , 1 sister(s) . 1 son(s) , 1 daughter(s) . Social History: Alcohol Use Patient does not use alcohol. Smoking Status Patient is a never smoker. Marital Status: W idow , W idow . Lives    w ith: alone. Occupation/W ork: employed full time teacher. Education/School: has highschool diploma, has college diploma [employer]. Family History   Problem Relation Age of Onset    Cancer Mother        OBJECTIVE:    Visit Vitals    /72    Pulse 63    Temp 97.6 °F (36.4 °C) (Oral)    Resp 16    Ht 5' (1.524 m)    Wt 193 lb 6.4 oz (87.7 kg)    SpO2 98%    BMI 37.77 kg/m2     CONSTITUTIONAL: well , well nourished, appears age appropriate  EYES: perrla, eom intact  ENMT:moist mucous membranes, pharynx clear  NECK: supple. Thyroid normal  RESPIRATORY: Chest: clear bilaterally   CARDIOVASCULAR: Heart: regular rate and rhythm  GASTROINTESTINAL: Abdomen: soft, bowel sounds active  HEMATOLOGIC: no pathological lymph nodes palpated  MUSCULOSKELETAL: Extremities: no edema, pulse 1+   INTEGUMENT: No unusual rashes or suspicious skin lesions noted. Nails appear normal.  NEUROLOGIC: non-focal exam   MENTAL STATUS: alert and oriented, appropriate affect           ASSESSMENT:  1. Wellness examination    2. Pulmonary nodule, right    3. Essential hypertension    4. Class 2 obesity due to excess calories without serious comorbidity with body mass index (BMI) of 37.0 to 37.9 in adult      Patient was treated for an acute bronchitis. The steroids I think were helpful and symptoms resolved quicker than without the steroids. Her lungs are now clear and free of wheezing or bronchospasm, so I think the illness is resolving.   We advised her if she starts coughing up yellowish or greenish stuff or other symptoms arise she should contact us and we can call in a rx for her. Her blood pressure control is at goal.    Her BMI reflects obesity and reflects no change from the weight we had in July. However, on her scales she's lost 7 pounds, which therefore advised she went up and came back down to her baseline of obesity. We encouraged physical activity and a heart healthy, weight reducing diet. She'll return to see us in one year for the physical.  She'll need to have a repeat chest CT in July. PLAN:  .  Orders Placed This Encounter    CT CHEST WO CONT    URINALYSIS W/ RFLX MICROSCOPIC    CBC WITH AUTOMATED DIFF    METABOLIC PANEL, COMPREHENSIVE    LIPID PANEL    TSH 3RD GENERATION    HEMOGLOBIN A1C WITH EAG    VITAMIN B12 & FOLATE    VITAMIN D, 1, 25 DIHYDROXY       Follow-up Disposition:  Return in about 6 months (around 7/9/2018). ATTENTION:   This medical record was transcribed using an electronic medical records system. Although proofread, it may and can contain electronic and spelling errors. Other human spelling and other errors may be present. Corrections may be executed at a later time. Please feel free to contact us for any clarifications as needed.

## 2018-01-10 LAB
1,25(OH)2D3 SERPL-MCNC: 53.9 PG/ML (ref 19.9–79.3)
ALBUMIN SERPL-MCNC: 3.9 G/DL (ref 3.6–4.8)
ALBUMIN/GLOB SERPL: 1.3 {RATIO} (ref 1.2–2.2)
ALP SERPL-CCNC: 80 IU/L (ref 39–117)
ALT SERPL-CCNC: 15 IU/L (ref 0–32)
APPEARANCE UR: CLEAR
AST SERPL-CCNC: 16 IU/L (ref 0–40)
BACTERIA #/AREA URNS HPF: ABNORMAL /[HPF]
BASOPHILS # BLD AUTO: 0 X10E3/UL (ref 0–0.2)
BASOPHILS NFR BLD AUTO: 0 %
BILIRUB SERPL-MCNC: 0.4 MG/DL (ref 0–1.2)
BILIRUB UR QL STRIP: NEGATIVE
BUN SERPL-MCNC: 17 MG/DL (ref 8–27)
BUN/CREAT SERPL: 21 (ref 12–28)
CALCIUM SERPL-MCNC: 8.9 MG/DL (ref 8.7–10.3)
CASTS URNS QL MICRO: ABNORMAL /LPF
CHLORIDE SERPL-SCNC: 101 MMOL/L (ref 96–106)
CHOLEST SERPL-MCNC: 163 MG/DL (ref 100–199)
CO2 SERPL-SCNC: 25 MMOL/L (ref 18–29)
COLOR UR: YELLOW
CREAT SERPL-MCNC: 0.82 MG/DL (ref 0.57–1)
EOSINOPHIL # BLD AUTO: 0.1 X10E3/UL (ref 0–0.4)
EOSINOPHIL NFR BLD AUTO: 1 %
EPI CELLS #/AREA URNS HPF: ABNORMAL /HPF
ERYTHROCYTE [DISTWIDTH] IN BLOOD BY AUTOMATED COUNT: 15.3 % (ref 12.3–15.4)
EST. AVERAGE GLUCOSE BLD GHB EST-MCNC: 117 MG/DL
FOLATE SERPL-MCNC: 18 NG/ML
GLOBULIN SER CALC-MCNC: 2.9 G/DL (ref 1.5–4.5)
GLUCOSE SERPL-MCNC: 92 MG/DL (ref 65–99)
GLUCOSE UR QL: NEGATIVE
HBA1C MFR BLD: 5.7 % (ref 4.8–5.6)
HCT VFR BLD AUTO: 40.5 % (ref 34–46.6)
HDLC SERPL-MCNC: 44 MG/DL
HGB BLD-MCNC: 12.7 G/DL (ref 11.1–15.9)
HGB UR QL STRIP: NEGATIVE
IMM GRANULOCYTES # BLD: 0 X10E3/UL (ref 0–0.1)
IMM GRANULOCYTES NFR BLD: 0 %
KETONES UR QL STRIP: NEGATIVE
LDLC SERPL CALC-MCNC: 89 MG/DL (ref 0–99)
LEUKOCYTE ESTERASE UR QL STRIP: ABNORMAL
LYMPHOCYTES # BLD AUTO: 2.3 X10E3/UL (ref 0.7–3.1)
LYMPHOCYTES NFR BLD AUTO: 38 %
MCH RBC QN AUTO: 25.9 PG (ref 26.6–33)
MCHC RBC AUTO-ENTMCNC: 31.4 G/DL (ref 31.5–35.7)
MCV RBC AUTO: 83 FL (ref 79–97)
MICRO URNS: ABNORMAL
MONOCYTES # BLD AUTO: 0.5 X10E3/UL (ref 0.1–0.9)
MONOCYTES NFR BLD AUTO: 8 %
MUCOUS THREADS URNS QL MICRO: PRESENT
NEUTROPHILS # BLD AUTO: 3.3 X10E3/UL (ref 1.4–7)
NEUTROPHILS NFR BLD AUTO: 53 %
NITRITE UR QL STRIP: NEGATIVE
PH UR STRIP: 5.5 [PH] (ref 5–7.5)
PLATELET # BLD AUTO: 270 X10E3/UL (ref 150–379)
POTASSIUM SERPL-SCNC: 4 MMOL/L (ref 3.5–5.2)
PROT SERPL-MCNC: 6.8 G/DL (ref 6–8.5)
PROT UR QL STRIP: ABNORMAL
RBC # BLD AUTO: 4.91 X10E6/UL (ref 3.77–5.28)
RBC #/AREA URNS HPF: ABNORMAL /HPF
SODIUM SERPL-SCNC: 142 MMOL/L (ref 134–144)
SP GR UR: 1.03 (ref 1–1.03)
TRIGL SERPL-MCNC: 149 MG/DL (ref 0–149)
TSH SERPL DL<=0.005 MIU/L-ACNC: 1.54 UIU/ML (ref 0.45–4.5)
UROBILINOGEN UR STRIP-MCNC: 0.2 MG/DL (ref 0.2–1)
VIT B12 SERPL-MCNC: 504 PG/ML (ref 232–1245)
VLDLC SERPL CALC-MCNC: 30 MG/DL (ref 5–40)
WBC # BLD AUTO: 6.2 X10E3/UL (ref 3.4–10.8)
WBC #/AREA URNS HPF: ABNORMAL /HPF

## 2018-07-10 ENCOUNTER — OFFICE VISIT (OUTPATIENT)
Dept: INTERNAL MEDICINE CLINIC | Age: 63
End: 2018-07-10

## 2018-07-10 VITALS
DIASTOLIC BLOOD PRESSURE: 80 MMHG | BODY MASS INDEX: 38.87 KG/M2 | WEIGHT: 198 LBS | RESPIRATION RATE: 16 BRPM | HEART RATE: 60 BPM | OXYGEN SATURATION: 100 % | SYSTOLIC BLOOD PRESSURE: 137 MMHG | HEIGHT: 60 IN | TEMPERATURE: 96.5 F

## 2018-07-10 DIAGNOSIS — Z12.11 SCREEN FOR COLON CANCER: ICD-10-CM

## 2018-07-10 DIAGNOSIS — I10 ESSENTIAL HYPERTENSION: ICD-10-CM

## 2018-07-10 DIAGNOSIS — R91.1 PULMONARY NODULE, RIGHT: Primary | ICD-10-CM

## 2018-07-10 DIAGNOSIS — B18.2 CHRONIC HEPATITIS C WITHOUT HEPATIC COMA (HCC): ICD-10-CM

## 2018-07-10 DIAGNOSIS — Z98.890 S/P COLONOSCOPY: ICD-10-CM

## 2018-07-10 PROBLEM — E66.01 SEVERE OBESITY (BMI 35.0-39.9): Status: ACTIVE | Noted: 2018-07-10

## 2018-07-10 NOTE — MR AVS SNAPSHOT
[address] [place] [address] [address] [id number] 
[phone] Patient: Rosa Howe MRN: [id number] SSI:[id number]/92/[id number] Visit Information Date & Time Provider Department Dept. Phone Encounter #  
 7/10/2018  1:00 PM Julia Whitaker MD [facility] Medicine and Primary Care [phone] Follow-up Instructions Return in about 6 months (around 1/10/2019). Upcoming Health Maintenance Date Due FOBT Q 1 YEAR AGE 50-75 7/18/2018 Influenza Age 5 to Adult 8/1/2018 PAP AKA CERVICAL CYTOLOGY 12/1/2018 BREAST CANCER SCRN MAMMOGRAM 2/22/2019 DTaP/Tdap/Td series (2 - Td) 1/9/2028 Allergies as of 7/10/2018  Review Complete On: 7/10/2018 By: Reina Mir LPN Severity Noted Reaction Type Reactions Codeine  12/23/2014   Side Effect Hives Current Immunizations  Never Reviewed No immunizations on file. Not reviewed this visit You Were Diagnosed With   
  
 Codes Comments Pulmonary nodule, right    -  Primary ICD-10-CM: R91.1 ICD-9-CM: 793.11 Screen for colon cancer     ICD-10-CM: Z12.11 ICD-9-CM: V76.51 Essential hypertension     ICD-10-CM: I10 
ICD-9-CM: 401.9 Chronic hepatitis C without hepatic coma (HCC)     ICD-10-CM: B18.2 ICD-9-CM: 070.54 S/P colonoscopy     ICD-10-CM: B76.853 ICD-9-CM: V45.89 Vitals BP Pulse Temp Resp Height(growth percentile) Weight(growth percentile) 137/80 60 96.5 °F (35.8 °C) (Oral) 16 5' (1.524 m) 198 lb (89.8 kg) SpO2 BMI OB Status Smoking Status 100% 38.67 kg/m2 Postmenopausal Never Smoker BMI and BSA Data Body Mass Index Body Surface Area  
 38.67 kg/m 2 1.95 m 2 Preferred Pharmacy Pharmacy Name Phone [facility] [phone] [place] [id number] [phone] Your Updated Medication List  
  
   
 This list is accurate as of 7/10/18  2:30 PM.  Always use your most recent med list.  
  
  
  
  
 hydroCHLOROthiazide 12.5 mg tablet Commonly known as:  HYDRODIURIL  
TAKE 1 TABLET BY MOUTH EVERY DAY  
  
 mometasone 50 mcg/actuation nasal spray Commonly known as:  NASONEX  
2 Sprays by Both Nostrils route daily. multivitamin tablet Commonly known as:  ONE A DAY Take 1 Tab by mouth daily. VITAMIN A PO Take 1 Tab by mouth daily. VITAMIN B-12 PO Take 1 Tab by mouth daily. We Performed the Following REFERRAL TO GASTROENTEROLOGY [TVZ21 Custom] Comments:  
 Please evaluate patient for colon/egd. Follow-up Instructions Return in about 6 months (around 1/10/2019). To-Do List   
 07/10/2018 Imaging:  CT CHEST WO CONT Referral Information Referral ID Referred By Referred To  
  
 [id number] [facility], [address] [place] [address] [address], [address] Phone: [phone] Fax: [phone] Visits Status Start Date End Date 1 New Request 7/10/18 7/10/19 If your referral has a status of pending review or denied, additional information will be sent to support the outcome of this decision. Introducing [facility] & HE[facility]! Cristiana Palomino introduces MyMundus patient portal. Now you can access parts of your medical record, email your doctor's office, and request medication refills online. 1. In your internet browser, go to [email or web address] 2. Click on the First Time User? Click Here link in the Sign In box. You will see the New Member Sign Up page. 3. Enter your MyMundus Access Code exactly as it appears below. You will not need to use this code after youve completed the sign-up process. If you do not sign up before the expiration date, you must request a new code.  
 
· MyMundus Access Code: [email or web address] 
 Expires: 10/8/2018  2:30 PM 
 
4. Enter the last four digits of your Social Security Number (xxxx) and Date of Birth (mm/dd/yyyy) as indicated and click Submit. You will be taken to the next sign-up page. 5. Create a Afraxis ID. This will be your Afraxis login ID and cannot be changed, so think of one that is secure and easy to remember. 6. Create a Afraxis password. You can change your password at any time. 7. Enter your Password Reset Question and Answer. This can be used at a later time if you forget your password. 8. Enter your e-mail address. You will receive e-mail notification when new information is available in [address] 9. Click Sign Up. You can now view and download portions of your medical record. 10. Click the Download Summary menu link to download a portable copy of your medical information. If you have questions, please visit the Frequently Asked Questions section of the Afraxis website. Remember, Afraxis is NOT to be used for urgent needs. For medical emergencies, dial 911. Now available from your iPhone and Android! Please provide this summary of care documentation to your next provider. Your primary care clinician is listed as Raulito Yanes. If you have any questions after today's visit, please call [phone].

## 2018-07-10 NOTE — PROGRESS NOTES
1. Have you been to the ER, urgent care clinic since your last visit? Hospitalized since your last visit? No    2. Have you seen or consulted any other health care providers outside of the [address] [place] [address] since your last visit? Include any pap smears or colon screening.  No

## 2018-07-10 NOTE — PROGRESS NOTES
[facility]  Layne Johnson MD, [address] [address]  Phone:  [phone]  Fax: [phone]       Chief Complaint   Patient presents with    Hypertension   . SUBJECTIVE:    Randall Marie is a 60 y.o. female Patient returns today with known history of primary hypertension, hepatitis C, treated, obesity, prediabetes, right pulmonary nodule, and is seen for evaluation. Patient returns today telling us that since we last saw her her father passed at the age of 80 of appendicle cancer. We have also seen her brother, Ana Mcgee, with abdominal lesion. Patient is very concerned and comes in for follow up and evaluation. She now has almost twice daily nausea. The nausea stopped shortly after she had the revision of her bypass, but now it's come back with a vengeance. Current Outpatient Prescriptions   Medication Sig Dispense Refill    hydroCHLOROthiazide (HYDRODIURIL) 12.5 mg tablet TAKE 1 TABLET BY MOUTH EVERY DAY 90 Tab 11    CYANOCOBALAMIN, VITAMIN B-12, (VITAMIN B-12 PO) Take 1 Tab by mouth daily.  multivitamin (ONE A DAY) tablet Take 1 Tab by mouth daily.  VITAMIN A PO Take 1 Tab by mouth daily.  mometasone (NASONEX) 50 mcg/actuation nasal spray 2 Sprays by Both Nostrils route daily.  1 Container 11     Past Medical History:   Diagnosis Date    Encounter for Hemoccult screening 07/19/2017    neg    H/O gastric bypass 2003    md roshni    Hepatitis C     rx ended 2008,2014 viral load non detected, syl luna md    Hypertension     Normal cardiac stress test 6-2-06    Obesity     Plantar fasciitis of right foot     Prediabetes     Pulmonary nodule, right 11-22-15  /  6-2-16    7mm  - repeat 6 mo  stable repeat 6/2/17    Renal cyst, right     S/P colonoscopy 2010    Wellness examination 01/09/2018     Past Surgical History:   Procedure Laterality Date    HX COLONOSCOPY      HX GYN Allergies   Allergen Reactions    Codeine Hives         REVIEW OF SYSTEMS:  General: negative for - chills or fever  ENT: negative for - headaches, nasal congestion or tinnitus  Respiratory: negative for - cough, hemoptysis, shortness of breath or wheezing  Cardiovascular : negative for - chest pain, edema, palpitations or shortness of breath  Gastrointestinal: negative for - abdominal pain, blood in stools, heartburn or nausea/vomiting  Genito-Urinary: no dysuria, trouble voiding, or hematuria  Musculoskeletal: negative for - gait disturbance, joint pain, joint stiffness or joint swelling  Neurological: no TIA or stroke symptoms  Hematologic: no bruises, no bleeding, no swollen glands  Integument: no lumps, mole changes, nail changes or rash  Endocrine: no malaise/lethargy or unexpected weight changes      Social History     Social History    Marital status:      Spouse name: N/A    Number of children: N/A    Years of education: N/A     Social History Main Topics    Smoking status: Never Smoker    Smokeless tobacco: Never Used    Alcohol use 1.0 oz/week     2 Glasses of wine per week      Comment: occasional    Drug use: No    Sexual activity: Yes     Partners: Male     Other Topics Concern    Not on file     Social History Narrative    Medical History: hepatitis C - how urbano luna mdUpper pole renal cystGastroesophageal reflux diseaseVenous stasis ulcersHistory of phlebitisJoe    joint disease kneeShe had endometriosisObesitynegative stress Cardiolite 2006 ejection fraction 62%Family history ovarian    cancerSummer  ophthalmological evaluation    Gyn History: Last pap date 2014. Surgical History: arthroscopic long limb gastric bypass w ith Tom-en-Y gastrojejunostomy md bryn 2003colonoscopy     Hospitalization/Major Diagnostic Procedure: Denies Past Hospitalization    Family History:  Mother:  79 yrs Ovarian cancer w ith metastatic disease Father:  80, ca appendix  Sister(s): alive Brother(s): alive Daughter(s):    alive Son(s): alive 3 brother(s) wai saldana - mass mesentery, 1 sister(s) . 1 son(s) , 1 daughter(s) . Social History: Alcohol Use Patient does not use alcohol. Smoking Status Patient is a never smoker. Marital Status: W idow , W idow . Lives    w ith: alone. Occupation/W ork: employed full time teacher. Education/School: has highschool diploma, has college diploma [employer]. Family History   Problem Relation Age of Onset    Cancer Mother        OBJECTIVE:    There were no vitals taken for this visit. CONSTITUTIONAL: well , well nourished, appears age appropriate  EYES: perrla, eom intact  ENMT:moist mucous membranes, pharynx clear  NECK: supple. Thyroid normal  RESPIRATORY: Chest: clear bilaterally   CARDIOVASCULAR: Heart: regular rate and rhythm  GASTROINTESTINAL: Abdomen: soft, bowel sounds active  HEMATOLOGIC: no pathological lymph nodes palpated  MUSCULOSKELETAL: Extremities: no edema, pulse 1+   INTEGUMENT: No unusual rashes or suspicious skin lesions noted. Nails appear normal.  NEUROLOGIC: non-focal exam   MENTAL STATUS: alert and oriented, appropriate affect           ASSESSMENT:  1. Pulmonary nodule, right    2. Screen for colon cancer    3. Essential hypertension    4. Chronic hepatitis C without hepatic coma ([address]    5. S/P colonoscopy      Patient's medical status is stable. BMI is noted. BP control is adequate at 130/90. She's due for her follow up CT of her chest to look at a nodule, which will be requested. For the nausea will ask her to see her gastroenterologist and also ask them to do a colonoscopy. She'll be back to see us in about six months, sooner if she has any problems. I have discussed the diagnosis with the patient and the intended plan as seen in the  orders above. The patient understands and agees with the plan.   The patient has   received an after visit summary and questions were answered concerning  future plans  Patient labs and/or xrays were reviewed  Past records were reviewed. PLAN:  .  Orders Placed This Encounter    CT CHEST WO CONT    REFERRAL TO GASTROENTEROLOGY       Follow-up Disposition: Not on File          ATTENTION:   This medical record was transcribed using an electronic medical records system. Although proofread, it may and can contain electronic and spelling errors. Other human spelling and other errors may be present. Corrections may be executed at a later time. Please feel free to contact us for any clarifications as needed.

## 2018-07-17 ENCOUNTER — HOSPITAL ENCOUNTER (OUTPATIENT)
Dept: CT IMAGING | Age: 63
Discharge: HOME | End: 2018-07-17
Attending: INTERNAL MEDICINE
Payer: COMMERCIAL

## 2018-07-17 DIAGNOSIS — R91.1 PULMONARY NODULE, RIGHT: ICD-10-CM

## 2018-07-17 PROCEDURE — 71250 CT THORAX DX C-: CPT

## 2018-11-28 ENCOUNTER — OFFICE VISIT (OUTPATIENT)
Dept: INTERNAL MEDICINE CLINIC | Age: 63
End: 2018-11-28

## 2018-11-28 VITALS
SYSTOLIC BLOOD PRESSURE: 156 MMHG | OXYGEN SATURATION: 99 % | BODY MASS INDEX: 40.52 KG/M2 | HEIGHT: 60 IN | DIASTOLIC BLOOD PRESSURE: 78 MMHG | HEART RATE: 46 BPM | RESPIRATION RATE: 18 BRPM | WEIGHT: 206.4 LBS | TEMPERATURE: 97.8 F

## 2018-11-28 DIAGNOSIS — M72.2 PLANTAR FASCIITIS OF RIGHT FOOT: ICD-10-CM

## 2018-11-28 DIAGNOSIS — I10 ESSENTIAL HYPERTENSION: Primary | ICD-10-CM

## 2018-11-28 DIAGNOSIS — R91.1 PULMONARY NODULE, RIGHT: ICD-10-CM

## 2018-11-28 DIAGNOSIS — R11.0 NAUSEA: ICD-10-CM

## 2018-11-28 DIAGNOSIS — B18.2 CHRONIC HEPATITIS C WITHOUT HEPATIC COMA (HCC): ICD-10-CM

## 2018-11-28 DIAGNOSIS — R73.03 PREDIABETES: ICD-10-CM

## 2018-11-28 DIAGNOSIS — E66.01 SEVERE OBESITY WITH BODY MASS INDEX (BMI) OF 35.0 TO 39.9 WITH SERIOUS COMORBIDITY (HCC): ICD-10-CM

## 2018-11-28 NOTE — PATIENT INSTRUCTIONS
Plantar Fasciitis: Care Instructions Your Care Instructions Plantar fasciitis is pain and inflammation of the plantar fascia, the tissue at the bottom of your foot that connects the heel bone to the toes. The plantar fascia also supports the arch. If you strain the plantar fascia, it can develop small tears and cause heel pain when you stand or walk. Plantar fasciitis can be caused by running or other sports. It also may occur in people who are overweight or who have high arches or flat feet. You may get plantar fasciitis if you walk or stand for long periods, or have a tight Achilles tendon or calf muscles. You can improve your foot pain with rest and other care at home. It might take a few weeks to a few months for your foot to heal completely. Follow-up care is a key part of your treatment and safety. Be sure to make and go to all appointments, and call your doctor if you are having problems. It's also a good idea to know your test results and keep a list of the medicines you take. How can you care for yourself at home? · Rest your feet often. Reduce your activity to a level that lets you avoid pain. If possible, do not run or walk on hard surfaces. · Take pain medicines exactly as directed. ? If the doctor gave you a prescription medicine for pain, take it as prescribed. ? If you are not taking a prescription pain medicine, take an over-the-counter anti-inflammatory medicine for pain and swelling, such as ibuprofen (Advil, Motrin) or naproxen (Aleve). Read and follow all instructions on the label. · Use ice massage to help with pain and swelling. You can use an ice cube or an ice cup several times a day. To make an ice cup, fill a paper cup with water and freeze it. Cut off the top of the cup until a half-inch of ice shows. Hold onto the remaining paper to use the cup. Rub the ice in small circles over the area for 5 to 7 minutes. · Contrast baths, which alternate hot and cold water, can also help reduce swelling. But because heat alone may make pain and swelling worse, end a contrast bath with a soak in cold water. · Wear a night splint if your doctor suggests it. A night splint holds your foot with the toes pointed up and the foot and ankle at a 90-degree angle. This position gives the bottom of your foot a constant, gentle stretch. · Do simple exercises such as calf stretches and towel stretches 2 to 3 times each day, especially when you first get up in the morning. These can help the plantar fascia become more flexible. They also make the muscles that support your arch stronger. Hold these stretches for 15 to 30 seconds per stretch. Repeat 2 to 4 times. ? Stand about 1 foot from a wall. Place the palms of both hands against the wall at chest level. Lean forward against the wall, keeping one leg with the knee straight and heel on the ground while bending the knee of the other leg. 
? Sit down on the floor or a mat with your feet stretched in front of you. Roll up a towel lengthwise, and loop it over the ball of your foot. Holding the towel at both ends, gently pull the towel toward you to stretch your foot. · Wear shoes with good arch support. Athletic shoes or shoes with a well-cushioned sole are good choices. · Try heel cups or shoe inserts (orthotics) to help cushion your heel. You can buy these at many shoe stores. · Put on your shoes as soon as you get out of bed. Going barefoot or wearing slippers may make your pain worse. · Reach and stay at a good weight for your height. This puts less strain on your feet. When should you call for help? Call your doctor now or seek immediate medical care if: 
  · You have heel pain with fever, redness, or warmth in your heel.  
  · You cannot put weight on the sore foot.  
 Watch closely for changes in your health, and be sure to contact your doctor if:   · You have numbness or tingling in your heel.  
  · Your heel pain lasts more than 2 weeks. Where can you learn more? Go to http://nely-dolores.info/. Sandra Torres in the search box to learn more about \"Plantar Fasciitis: Care Instructions. \" Current as of: November 29, 2017 Content Version: 11.8 © 8865-7617 [employer]. Care instructions adapted under license by [employer] (which disclaims liability or warranty for this information). If you have questions about a medical condition or this instruction, always ask your healthcare professional. [place] [address] any warranty or liability for your use of this information. Plantar Fasciitis: Exercises Your Care Instructions Here are some examples of typical rehabilitation exercises for your condition. Start each exercise slowly. Ease off the exercise if you start to have pain. Your doctor or physical therapist will tell you when you can start these exercises and which ones will work best for you. How to do the exercises Towel stretch 1. Sit with your legs extended and knees straight. 2. Place a towel around your foot just under the toes. 3. Hold each end of the towel in each hand, with your hands above your knees. 4. Pull back with the towel so that your foot stretches toward you. 5. Hold the position for at least 15 to 30 seconds. 6. Repeat 2 to 4 times a session, up to 5 sessions a day. Calf stretch 1. Stand facing a wall with your hands on the wall at about eye level. Put the leg you want to stretch about a step behind your other leg. 2. Keeping your back heel on the floor, bend your front knee until you feel a stretch in the back leg. 3. Hold the stretch for 15 to 30 seconds. Repeat 2 to 4 times. Plantar fascia and calf stretch 1. Stand on a step as shown above. Be sure to hold on to the banister. 2. Slowly let your heels down over the edge of the step as you relax your calf muscles. You should feel a gentle stretch across the bottom of your foot and up the back of your leg to your knee. 3. Hold the stretch about 15 to 30 seconds, and then tighten your calf muscle a little to bring your heel back up to the level of the step. Repeat 2 to 4 times. Towel curls 1. While sitting, place your foot on a towel on the floor and scrunch the towel toward you with your toes. 2. Then, also using your toes, push the towel away from you. Marianna pickups 1. Put marbles on the floor next to a cup. 
2. Using your toes, try to lift the marbles up from the floor and put them in the cup. Follow-up care is a key part of your treatment and safety. Be sure to make and go to all appointments, and call your doctor if you are having problems. It's also a good idea to know your test results and keep a list of the medicines you take. Where can you learn more? Go to http://nely-dolores.info/. Aroldo Carnes in the search box to learn more about \"Plantar Fasciitis: Exercises. \" Current as of: November 29, 2017 Content Version: 11.8 © 8955-4629 [employer]. Care instructions adapted under license by [employer] (which disclaims liability or warranty for this information). If you have questions about a medical condition or this instruction, always ask your healthcare professional. [place] [address] any warranty or liability for your use of this information.

## 2018-11-28 NOTE — PROGRESS NOTES
SPORTS MEDICINE AND PRIMARY CARE Karol Scott MD, [address] [address] Phone:  [phone]  Fax: [phone] Chief Complaint Patient presents with  
[facility] Follow Up Berkley Romeo SUBJECTIVE: 
  Ryan Brooks is a 58 y.o. female Patient comes in today with known history of obesity, status post gastric bypass, primary hypertension, right pulmonary nodule, stable with CT scan of 07/17/18 and we have elected to recommend repeat in one year in 08/2019, hepatitis C, treated, and is seen for evaluation. 
  
Since we last saw her patient was seen at [facility] on 11/15/18 for unspecified osteoarthritis, unspecified site, with her complaints at that time of left heel pain and generalized coryza and persistent nonproductive cough with wheezing that started the week previous to that. Xray of the calcaneus revealed a heel spur, but otherwise unremarkable, and for the cough she was given Delsym and to push fluids. She had a chest xray taken also that was apparently negative and she was therefore treated for plantar fascial fibromyositis and acute bronchitis by NATALIA Mitchell, and Antonio Herzog MD. 
Patient continues to have right heel pain. She has had this in the past.  The last time we saw her she elected to go to the podiatrist, who gave her an injection with relief of the discomfort. She still has the problem. Current Outpatient Medications Medication Sig Dispense Refill  hydroCHLOROthiazide (HYDRODIURIL) 12.5 mg tablet TAKE 1 TABLET BY MOUTH EVERY DAY 90 Tab 11  
 CYANOCOBALAMIN, VITAMIN B-12, (VITAMIN B-12 PO) Take 1 Tab by mouth daily.  multivitamin (ONE A DAY) tablet Take 1 Tab by mouth daily.  VITAMIN A PO Take 1 Tab by mouth daily.  mometasone (NASONEX) 50 mcg/actuation nasal spray 2 Sprays by Both Nostrils route daily. 1 Container 11 Past Medical History:  
Diagnosis Date  Encounter for Hemoccult screening 07/19/2017  
 neg  H/O gastric bypass 2003  
 md roshni  
 Hepatitis C   
 rx ended 2008,2014 viral load non detected, syl luna md  
 Hypertension  Normal cardiac stress test 6-2-06  Obesity  Plantar fasciitis of right foot  Prediabetes  Pulmonary nodule, right 11-22-15  /  6-2-16  
 7mm  - repeat 6 mo  stable repeat 6/2/17  Renal cyst, right  S/P colonoscopy 2010  Wellness examination 01/09/2018 Past Surgical History:  
Procedure Laterality Date  HX COLONOSCOPY    
 HX GYN Allergies Allergen Reactions  Codeine Hives REVIEW OF SYSTEMS: 
General: negative for - chills or fever ENT: negative for - headaches, nasal congestion or tinnitus Respiratory: negative for - cough, hemoptysis, shortness of breath or wheezing Cardiovascular : negative for - chest pain, edema, palpitations or shortness of breath Gastrointestinal: negative for - abdominal pain, blood in stools, heartburn or nausea/vomiting Genito-Urinary: no dysuria, trouble voiding, or hematuria Musculoskeletal: negative for - gait disturbance, joint pain, joint stiffness or joint swelling Neurological: no TIA or stroke symptoms Hematologic: no bruises, no bleeding, no swollen glands Integument: no lumps, mole changes, nail changes or rash Endocrine: no malaise/lethargy or unexpected weight changes Social History Socioeconomic History  Marital status:  Spouse name: Not on file  Number of children: Not on file  Years of education: Not on file  Highest education level: Not on file Tobacco Use  Smoking status: Never Smoker  Smokeless tobacco: Never Used Substance and Sexual Activity  Alcohol use: Yes Alcohol/week: 1.0 oz Types: 2 Glasses of wine per week Comment: occasional  
 Drug use: No  
 Sexual activity: Yes  
  Partners: Male Social History Narrative Medical History: hepatitis C - how urbano luna mdUpper pole renal cystGastroesophageal reflux diseaseVenous stasis ulcersHistory of phlebitisJoe  
 joint disease kneeShe had endometriosisObesitynegative stress Cardiolite 2006 ejection fraction 62%Family history ovarian  
 cancerSummer  ophthalmological evaluation Gyn History: Last pap date 2014. Surgical History: arthroscopic long limb gastric bypass w ith Tom-en-Y gastrojejunostomy md bryn 2003colonoscopy  Hospitalization/Major Diagnostic Procedure: Denies Past Hospitalization Family History: Mother:  79 yrs Ovarian cancer w ith metastatic disease Father:  80, ca appendix  Sister(s): alive Brother(s): alive Daughter(s):  
 alive Son(s): alive 3 brother(s) wai saldana - mass mesentery, 1 sister(s) . 1 son(s) , 1 daughter(s) . Social History: Alcohol Use Patient does not use alcohol. Smoking Status Patient is a never smoker. Marital Status: W idow , W idow . Lives  
 w ith: alone. Occupation/W ork: employed full time teacher. Education/School: has highschool diploma, has college diploma [employer]. Family History Problem Relation Age of Onset  Cancer Mother OBJECTIVE: 
 
Visit Vitals /78 Pulse (!) 46 Temp 97.8 °F (36.6 °C) Resp 18 Ht 5' (1.524 m) Wt 206 lb 6.4 oz (93.6 kg) SpO2 99% BMI 40.31 kg/m² CONSTITUTIONAL: well , well nourished, appears age appropriate EYES: perrla, eom intact ENMT:moist mucous membranes, pharynx clear NECK: supple. Thyroid normal 
RESPIRATORY: Chest: clear bilaterally CARDIOVASCULAR: Heart: regular rate and rhythm GASTROINTESTINAL: Abdomen: soft, bowel sounds active HEMATOLOGIC: no pathological lymph nodes palpated MUSCULOSKELETAL: Extremities: no edema, pulse 1+ INTEGUMENT: No unusual rashes or suspicious skin lesions noted. Nails appear normal. 
NEUROLOGIC: non-focal exam  
MENTAL STATUS: alert and oriented, appropriate affect ASSESSMENT: 
1. Essential hypertension 2. Severe obesity with body mass index (BMI) of 35.0 to 39.9 with serious comorbidity ([address] 3. Chronic hepatitis C without hepatic coma (HCC) 4. Pulmonary nodule, right 5. Prediabetes 6. Plantar fasciitis of right foot 7. Nausea By exam her asthmatic bronchitis has resolved. We suggest she continue Mucinex as needed. If she starts coughing up yellowish or greenish material she will call the office and we will call an antibiotic in for her. If she starts wheezing again she will call the office and if she does not have a jet neb we will place her on jet nebs. If she is using jet nebs then we will add a short course of steroids. We remind her, as we have many times, that she can call us at any time should she have an acute issue and we probably can keep her from having to go to [facility].  We also remind her she can walk in to see us any time with or without an appointment, as long as I am in the office I will see her, and if she does not have time to do any of those things she can otherwise call Brent Stanton or the nurse answering the phone and we can help her on the phone. Her blood pressure is a little higher than I would like to see it. When she was at [facility] her blood pressure was normal at 138/82 and therefore no adjustment will be made in medication. We discuss with her the right lung nodule and recall that she had it in July, they said it was stable, but just to be on the safe side we suggested that she have it repeated in one year, which will be July of next year. She agrees with the plan. She will be back to see me in four to six months. We encouraged physical activity 30 minutes five days a week and a heart healthy, weight reducing diet, so next time we see her she will be out of the 200 club.  
 
Patient has been under the care of Dr. Dmitry Jensen, who replaced Dr. Mariam Russ Alida Islas for her GI issues. She is complaining of nausea and he is requesting amylase and lipase for its evaluation. We will request that, as well as BMP. If the nausea continues, consideration of a gastric emptying study. She has an ultrasound scheduled with Dr. Sandra Denson. The laboratory studies will be available to him, but we will also send him a copy in the mail. I have discussed the diagnosis with the patient and the intended plan as seen in the 
orders above. The patient understands and agees with the plan. The patient has  
received an after visit summary and questions were answered concerning 
future plans Patient labs and/or xrays were reviewed Past records were reviewed. PLAN: 
. Orders Placed This Encounter  CT CHEST WO CONT  METABOLIC PANEL, BASIC  CBC WITH AUTOMATED DIFF  
 AMYLASE  LIPASE Follow-up Disposition: 
Return in about 4 months (around 3/28/2019). ATTENTION:  
This medical record was transcribed using an electronic medical records system. Although proofread, it may and can contain electronic and spelling errors. Other human spelling and other errors may be present. Corrections may be executed at a later time. Please feel free to contact us for any clarifications as needed.

## 2018-11-28 NOTE — PROGRESS NOTES
1. Have you been to the ER, urgent care clinic since your last visit? Hospitalized since your last visit? Yes When: 11-16-18 Reason for visit: cold symptoms 2. Have you seen or consulted any other health care providers outside of the [facility] since your last visit? Include any pap smears or colon screening.  [facility]  
 
[facility]

## 2018-11-29 LAB
AMYLASE SERPL-CCNC: 96 U/L (ref 31–124)
BASOPHILS # BLD AUTO: 0 X10E3/UL (ref 0–0.2)
BASOPHILS NFR BLD AUTO: 0 %
BUN SERPL-MCNC: 19 MG/DL (ref 8–27)
BUN/CREAT SERPL: 20 (ref 12–28)
CALCIUM SERPL-MCNC: 9.2 MG/DL (ref 8.7–10.3)
CHLORIDE SERPL-SCNC: 103 MMOL/L (ref 96–106)
CO2 SERPL-SCNC: 23 MMOL/L (ref 20–29)
CREAT SERPL-MCNC: 0.97 MG/DL (ref 0.57–1)
EOSINOPHIL # BLD AUTO: 0.2 X10E3/UL (ref 0–0.4)
EOSINOPHIL NFR BLD AUTO: 3 %
ERYTHROCYTE [DISTWIDTH] IN BLOOD BY AUTOMATED COUNT: 15.6 % (ref 12.3–15.4)
GLUCOSE SERPL-MCNC: 87 MG/DL (ref 65–99)
HCT VFR BLD AUTO: 36.9 % (ref 34–46.6)
HGB BLD-MCNC: 12.1 G/DL (ref 11.1–15.9)
IMM GRANULOCYTES # BLD: 0 X10E3/UL (ref 0–0.1)
IMM GRANULOCYTES NFR BLD: 0 %
LIPASE SERPL-CCNC: 32 U/L (ref 14–72)
LYMPHOCYTES # BLD AUTO: 2.5 X10E3/UL (ref 0.7–3.1)
LYMPHOCYTES NFR BLD AUTO: 36 %
MCH RBC QN AUTO: 26.4 PG (ref 26.6–33)
MCHC RBC AUTO-ENTMCNC: 32.8 G/DL (ref 31.5–35.7)
MCV RBC AUTO: 81 FL (ref 79–97)
MONOCYTES # BLD AUTO: 0.5 X10E3/UL (ref 0.1–0.9)
MONOCYTES NFR BLD AUTO: 7 %
NEUTROPHILS # BLD AUTO: 3.7 X10E3/UL (ref 1.4–7)
NEUTROPHILS NFR BLD AUTO: 54 %
PLATELET # BLD AUTO: 242 X10E3/UL (ref 150–379)
POTASSIUM SERPL-SCNC: 5.1 MMOL/L (ref 3.5–5.2)
RBC # BLD AUTO: 4.58 X10E6/UL (ref 3.77–5.28)
SODIUM SERPL-SCNC: 142 MMOL/L (ref 134–144)
WBC # BLD AUTO: 6.9 X10E3/UL (ref 3.4–10.8)

## 2018-12-01 ENCOUNTER — HOSPITAL ENCOUNTER (OUTPATIENT)
Dept: ULTRASOUND IMAGING | Age: 63
Discharge: HOME | End: 2018-12-01
Attending: INTERNAL MEDICINE
Payer: COMMERCIAL

## 2018-12-01 DIAGNOSIS — R10.84 GENERALIZED ABDOMINAL PAIN: ICD-10-CM

## 2018-12-01 DIAGNOSIS — R11.0 NAUSEA: ICD-10-CM

## 2018-12-01 PROCEDURE — 76700 US EXAM ABDOM COMPLETE: CPT

## 2018-12-31 ENCOUNTER — HOSPITAL ENCOUNTER (OUTPATIENT)
Dept: CT IMAGING | Age: 63
Discharge: HOME | End: 2018-12-31
Attending: INTERNAL MEDICINE
Payer: COMMERCIAL

## 2018-12-31 DIAGNOSIS — R11.0 NAUSEA: ICD-10-CM

## 2018-12-31 PROCEDURE — 74011636320 HC RX REV CODE- 636/320: Performed by: INTERNAL MEDICINE

## 2018-12-31 PROCEDURE — 74177 CT ABD & PELVIS W/CONTRAST: CPT

## 2018-12-31 PROCEDURE — 74011250636 HC RX REV CODE- 250/636: Performed by: INTERNAL MEDICINE

## 2018-12-31 RX ORDER — SODIUM CHLORIDE 9 MG/ML
50 INJECTION, SOLUTION INTRAVENOUS
Status: COMPLETED | OUTPATIENT
Start: 2018-12-31 | End: 2018-12-31

## 2018-12-31 RX ORDER — SODIUM CHLORIDE 0.9 % (FLUSH) 0.9 %
10 SYRINGE (ML) INJECTION
Status: COMPLETED | OUTPATIENT
Start: 2018-12-31 | End: 2018-12-31

## 2018-12-31 RX ADMIN — IOHEXOL 50 ML: 240 INJECTION, SOLUTION INTRATHECAL; INTRAVASCULAR; INTRAVENOUS; ORAL at 09:00

## 2018-12-31 RX ADMIN — Medication 10 ML: at 09:00

## 2018-12-31 RX ADMIN — SODIUM CHLORIDE 50 ML/HR: 900 INJECTION, SOLUTION INTRAVENOUS at 09:00

## 2018-12-31 RX ADMIN — IOPAMIDOL 100 ML: 755 INJECTION, SOLUTION INTRAVENOUS at 09:00

## 2019-01-28 RX ORDER — HYDROCHLOROTHIAZIDE 12.5 MG/1
TABLET ORAL
Qty: 90 TAB | Refills: 3 | Status: SHIPPED | OUTPATIENT
Start: 2019-01-28 | End: 2019-12-05 | Stop reason: SDUPTHER

## 2019-06-03 ENCOUNTER — OFFICE VISIT (OUTPATIENT)
Dept: INTERNAL MEDICINE CLINIC | Age: 64
End: 2019-06-03

## 2019-06-03 VITALS
OXYGEN SATURATION: 98 % | HEART RATE: 46 BPM | DIASTOLIC BLOOD PRESSURE: 88 MMHG | BODY MASS INDEX: 40.56 KG/M2 | HEIGHT: 60 IN | TEMPERATURE: 97.4 F | SYSTOLIC BLOOD PRESSURE: 128 MMHG | RESPIRATION RATE: 16 BRPM | WEIGHT: 206.6 LBS

## 2019-06-03 DIAGNOSIS — B18.2 CHRONIC HEPATITIS C WITHOUT HEPATIC COMA (HCC): ICD-10-CM

## 2019-06-03 DIAGNOSIS — R73.03 PREDIABETES: ICD-10-CM

## 2019-06-03 DIAGNOSIS — E66.01 SEVERE OBESITY WITH BODY MASS INDEX (BMI) OF 35.0 TO 39.9 WITH SERIOUS COMORBIDITY (HCC): ICD-10-CM

## 2019-06-03 DIAGNOSIS — I10 ESSENTIAL HYPERTENSION: Primary | ICD-10-CM

## 2019-06-03 DIAGNOSIS — R91.1 PULMONARY NODULE, RIGHT: ICD-10-CM

## 2019-06-03 DIAGNOSIS — R06.09 DOE (DYSPNEA ON EXERTION): ICD-10-CM

## 2019-06-03 DIAGNOSIS — Z98.84 H/O GASTRIC BYPASS: ICD-10-CM

## 2019-06-03 RX ORDER — PAROXETINE 10 MG/1
10 TABLET, FILM COATED ORAL DAILY
Qty: 30 TAB | Refills: 3 | Status: ON HOLD | OUTPATIENT
Start: 2019-06-03 | End: 2022-03-01

## 2019-06-03 NOTE — PROGRESS NOTES
SPORTS MEDICINE AND PRIMARY CARE  Jose Piedra MD, [address] [address]  Phone:  [phone]  Fax: [phone]       Chief Complaint   Patient presents with    Hypertension     f/u   . SUBJECTIVE:    Emmett Gates is a 58 y.o. female Patient returns today with known history of primary hypertension, treated hepatitis C, gastric bypass surgery, prediabetes, obesity, and is seen for evaluation. Patient returns today complaining of exertional dyspnea on exertion, particularly going up steps. She denies chest pain or heaviness in her chest and is seen for evaluation. She is also concerned about the nodules on her lungs. Current Outpatient Medications   Medication Sig Dispense Refill    PARoxetine (PAXIL) 10 mg tablet Take 1 Tab by mouth daily. 30 Tab 3    hydroCHLOROthiazide (HYDRODIURIL) 12.5 mg tablet TAKE 1 TABLET BY MOUTH EVERY DAY 90 Tab 3    CYANOCOBALAMIN, VITAMIN B-12, (VITAMIN B-12 PO) Take 1 Tab by mouth daily.  multivitamin (ONE A DAY) tablet Take 1 Tab by mouth daily.  VITAMIN A PO Take 1 Tab by mouth daily.  mometasone (NASONEX) 50 mcg/actuation nasal spray 2 Sprays by Both Nostrils route daily.  1 Container 11     Past Medical History:   Diagnosis Date    RATLIFF (dyspnea on exertion) 06/03/2019    Encounter for Hemoccult screening 07/19/2017    neg    H/O gastric bypass 2003    md roshni    Hepatitis C     rx ended 2008,2014 viral load non detected, syl luna md    Hypertension     Normal cardiac stress test 6-2-06    Obesity     Plantar fasciitis of right foot     Prediabetes     Pulmonary nodule, right 11-22-15  /  6-2-16    7mm  - repeat 6 mo  stable repeat 6/2/17    Renal cyst, right     S/P colonoscopy 2010    Wellness examination 01/09/2018     Past Surgical History:   Procedure Laterality Date    HX COLONOSCOPY      HX GYN       Allergies   Allergen Reactions    Codeine Hives         REVIEW OF SYSTEMS:  General: negative for - chills or fever  ENT: negative for - headaches, nasal congestion or tinnitus  Respiratory: negative for - cough, hemoptysis, shortness of breath or wheezing  Cardiovascular : negative for - chest pain, edema, palpitations or shortness of breath  Gastrointestinal: negative for - abdominal pain, blood in stools, heartburn or nausea/vomiting  Genito-Urinary: no dysuria, trouble voiding, or hematuria  Musculoskeletal: negative for - gait disturbance, joint pain, joint stiffness or joint swelling  Neurological: no TIA or stroke symptoms  Hematologic: no bruises, no bleeding, no swollen glands  Integument: no lumps, mole changes, nail changes or rash  Endocrine: no malaise/lethargy or unexpected weight changes      Social History     Socioeconomic History    Marital status:      Spouse name: Not on file    Number of children: Not on file    Years of education: Not on file    Highest education level: Not on file   Tobacco Use    Smoking status: Never Smoker    Smokeless tobacco: Never Used   Substance and Sexual Activity    Alcohol use: Yes     Alcohol/week: 1.0 oz     Types: 2 Glasses of wine per week     Comment: occasional    Drug use: No    Sexual activity: Yes     Partners: Male   Social History Narrative    Medical History: hepatitis C - how urbano luna mdUpper pole renal cystGastroesophageal reflux diseaseVenous stasis ulcersHistory of phlebitisJoe    joint disease kneeShe had endometriosisObesitynegative stress Cardiolite 2006 ejection fraction 62%Family history ovarian    cancerSummer  ophthalmological evaluation    Gyn History: Last pap date 2014. Surgical History: arthroscopic long limb gastric bypass w ith Tmo-en-Y gastrojejunostomy md bryn 2003colonoscopy     Hospitalization/Major Diagnostic Procedure: Denies Past Hospitalization    Family History:  Mother:  79 yrs Ovarian cancer w ith metastatic disease Father:  80, ca appendix  Sister(s): alive Brother(s): alive Daughter(s):    alive Son(s): alive 3 brother(s) wai saldana - mass mesentery, 1 sister(s) . 1 son(s) , 1 daughter(s) . Social History: Alcohol Use Patient does not use alcohol. Smoking Status Patient is a never smoker. Marital Status: W idow , W idow . Lives    w ith: alone. Occupation/W ork: employed full time teacher. Education/School: has highschool diploma, has college diploma [employer]. Family History   Problem Relation Age of Onset    Cancer Mother        OBJECTIVE:    Visit Vitals  /78   Pulse (!) 46   Temp 97.4 °F (36.3 °C) (Oral)   Resp 16   Ht 5' (1.524 m)   Wt 206 lb 9.6 oz (93.7 kg)   SpO2 98%   BMI 40.35 kg/m²     CONSTITUTIONAL: well , well nourished, appears age appropriate  EYES: perrla, eom intact  ENMT:moist mucous membranes, pharynx clear  NECK: supple. Thyroid normal  RESPIRATORY: Chest: clear bilaterally   CARDIOVASCULAR: Heart: regular rate and rhythm  GASTROINTESTINAL: Abdomen: soft, bowel sounds active  HEMATOLOGIC: no pathological lymph nodes palpated  MUSCULOSKELETAL: Extremities: no edema, pulse 1+   INTEGUMENT: No unusual rashes or suspicious skin lesions noted. Nails appear normal.  NEUROLOGIC: non-focal exam   MENTAL STATUS: alert and oriented, appropriate affect           ASSESSMENT:  1. Essential hypertension    2. Chronic hepatitis C without hepatic coma ([facility] [address]    3. H/O gastric bypass    4. Prediabetes    5. Severe obesity with body mass index (BMI) of 35.0 to 39.9 with serious comorbidity (HCC)    6. Pulmonary nodule, right    7. RATLIFF (dyspnea on exertion)      Patient complains primarily of nausea. It is not happening every day, maybe once a week. We suggest a clear liquid diet for 24 hours surrounding the nausea. For the dyspnea on exertion will do an EKG today. We will also ask for a nuclear medicine stress test to exclude cardiac pathology. The nodules I suspect are benign.   We will repeat the CT scan for confirmation and that should be the last CT scan she will need of those nodules. Weight remains a concern. She claims she is going to get out of the [employer] [place] this summer. She will be back to see us in four to six months, sooner if she needs to. I have discussed the diagnosis with the patient and the intended plan as seen in the  orders above. The patient understands and agees with the plan. The patient has   received an after visit summary and questions were answered concerning  future plans  Patient labs and/or xrays were reviewed  Past records were reviewed. PLAN:  .  Orders Placed This Encounter    MIGUEL MAMMO BI SCREENING INCL CAD    CT CHEST WO CONT    URINALYSIS W/ RFLX MICROSCOPIC    CBC WITH AUTOMATED DIFF    METABOLIC PANEL, COMPREHENSIVE    LIPID PANEL    TSH 3RD GENERATION    HEMOGLOBIN A1C WITH EAG    AMB POC EKG ROUTINE W/ 12 LEADS, INTER & REP    PARoxetine (PAXIL) 10 mg tablet       Follow-up and Dispositions    · Return in about 4 months (around 10/3/2019). ATTENTION:   This medical record was transcribed using an electronic medical records system. Although proofread, it may and can contain electronic and spelling errors. Other human spelling and other errors may be present. Corrections may be executed at a later time. Please feel free to contact us for any clarifications as needed.

## 2019-06-03 NOTE — PROGRESS NOTES
1. Have you been to the ER, urgent care clinic since your last visit? Hospitalized since your last visit? No    2. Have you seen or consulted any other health care providers outside of the [address] [place] [address] since your last visit? Include any pap smears or colon screening.  No     Wants to discuss SOB and nausea

## 2019-06-05 LAB
ALBUMIN SERPL-MCNC: 3.9 G/DL (ref 3.6–4.8)
ALBUMIN/GLOB SERPL: 1.3 {RATIO} (ref 1.2–2.2)
ALP SERPL-CCNC: 96 IU/L (ref 39–117)
ALT SERPL-CCNC: 11 IU/L (ref 0–32)
APPEARANCE UR: CLEAR
AST SERPL-CCNC: 21 IU/L (ref 0–40)
BACTERIA #/AREA URNS HPF: ABNORMAL /[HPF]
BASOPHILS # BLD AUTO: 0 X10E3/UL (ref 0–0.2)
BASOPHILS NFR BLD AUTO: 0 %
BILIRUB SERPL-MCNC: <0.2 MG/DL (ref 0–1.2)
BILIRUB UR QL STRIP: NEGATIVE
BUN SERPL-MCNC: 14 MG/DL (ref 8–27)
BUN/CREAT SERPL: 17 (ref 12–28)
CALCIUM SERPL-MCNC: 9.2 MG/DL (ref 8.7–10.3)
CASTS URNS QL MICRO: ABNORMAL /LPF
CHLORIDE SERPL-SCNC: 105 MMOL/L (ref 96–106)
CHOLEST SERPL-MCNC: 185 MG/DL (ref 100–199)
CO2 SERPL-SCNC: 24 MMOL/L (ref 20–29)
COLOR UR: YELLOW
CREAT SERPL-MCNC: 0.81 MG/DL (ref 0.57–1)
EOSINOPHIL # BLD AUTO: 0.1 X10E3/UL (ref 0–0.4)
EOSINOPHIL NFR BLD AUTO: 2 %
EPI CELLS #/AREA URNS HPF: >10 /HPF (ref 0–10)
ERYTHROCYTE [DISTWIDTH] IN BLOOD BY AUTOMATED COUNT: 15.6 % (ref 12.3–15.4)
EST. AVERAGE GLUCOSE BLD GHB EST-MCNC: 117 MG/DL
GLOBULIN SER CALC-MCNC: 2.9 G/DL (ref 1.5–4.5)
GLUCOSE SERPL-MCNC: 92 MG/DL (ref 65–99)
GLUCOSE UR QL: NEGATIVE
HBA1C MFR BLD: 5.7 % (ref 4.8–5.6)
HCT VFR BLD AUTO: 39.4 % (ref 34–46.6)
HDLC SERPL-MCNC: 58 MG/DL
HGB BLD-MCNC: 12.4 G/DL (ref 11.1–15.9)
HGB UR QL STRIP: ABNORMAL
IMM GRANULOCYTES # BLD AUTO: 0 X10E3/UL (ref 0–0.1)
IMM GRANULOCYTES NFR BLD AUTO: 0 %
KETONES UR QL STRIP: NEGATIVE
LDLC SERPL CALC-MCNC: 98 MG/DL (ref 0–99)
LEUKOCYTE ESTERASE UR QL STRIP: ABNORMAL
LYMPHOCYTES # BLD AUTO: 2.4 X10E3/UL (ref 0.7–3.1)
LYMPHOCYTES NFR BLD AUTO: 36 %
MCH RBC QN AUTO: 25.6 PG (ref 26.6–33)
MCHC RBC AUTO-ENTMCNC: 31.5 G/DL (ref 31.5–35.7)
MCV RBC AUTO: 81 FL (ref 79–97)
MICRO URNS: ABNORMAL
MONOCYTES # BLD AUTO: 0.4 X10E3/UL (ref 0.1–0.9)
MONOCYTES NFR BLD AUTO: 6 %
MUCOUS THREADS URNS QL MICRO: PRESENT
NEUTROPHILS # BLD AUTO: 3.7 X10E3/UL (ref 1.4–7)
NEUTROPHILS NFR BLD AUTO: 56 %
NITRITE UR QL STRIP: NEGATIVE
PH UR STRIP: 5.5 [PH] (ref 5–7.5)
PLATELET # BLD AUTO: 271 X10E3/UL (ref 150–450)
POTASSIUM SERPL-SCNC: 4.7 MMOL/L (ref 3.5–5.2)
PROT SERPL-MCNC: 6.8 G/DL (ref 6–8.5)
PROT UR QL STRIP: NEGATIVE
RBC # BLD AUTO: 4.84 X10E6/UL (ref 3.77–5.28)
RBC #/AREA URNS HPF: ABNORMAL /HPF (ref 0–2)
SODIUM SERPL-SCNC: 141 MMOL/L (ref 134–144)
SP GR UR: 1.02 (ref 1–1.03)
TRIGL SERPL-MCNC: 144 MG/DL (ref 0–149)
TSH SERPL DL<=0.005 MIU/L-ACNC: 2.5 UIU/ML (ref 0.45–4.5)
UROBILINOGEN UR STRIP-MCNC: 0.2 MG/DL (ref 0.2–1)
VLDLC SERPL CALC-MCNC: 29 MG/DL (ref 5–40)
WBC # BLD AUTO: 6.6 X10E3/UL (ref 3.4–10.8)
WBC #/AREA URNS HPF: >30 /HPF (ref 0–5)

## 2019-06-17 ENCOUNTER — HOSPITAL ENCOUNTER (OUTPATIENT)
Dept: NON INVASIVE DIAGNOSTICS | Age: 64
Discharge: HOME | End: 2019-06-17
Attending: INTERNAL MEDICINE
Payer: COMMERCIAL

## 2019-06-17 ENCOUNTER — HOSPITAL ENCOUNTER (OUTPATIENT)
Dept: NUCLEAR MEDICINE | Age: 64
Discharge: HOME | End: 2019-06-17
Attending: INTERNAL MEDICINE
Payer: COMMERCIAL

## 2019-06-17 ENCOUNTER — HOSPITAL ENCOUNTER (OUTPATIENT)
Dept: MAMMOGRAPHY | Age: 64
Discharge: HOME | End: 2019-06-17
Attending: INTERNAL MEDICINE
Payer: COMMERCIAL

## 2019-06-17 ENCOUNTER — APPOINTMENT (OUTPATIENT)
Dept: CT IMAGING | Age: 64
End: 2019-06-17
Attending: INTERNAL MEDICINE
Payer: COMMERCIAL

## 2019-06-17 VITALS — HEIGHT: 60 IN | BODY MASS INDEX: 40.64 KG/M2 | WEIGHT: 207 LBS

## 2019-06-17 DIAGNOSIS — R91.1 PULMONARY NODULE, RIGHT: ICD-10-CM

## 2019-06-17 DIAGNOSIS — R06.09 DOE (DYSPNEA ON EXERTION): ICD-10-CM

## 2019-06-17 PROBLEM — Z92.89 H/O CARDIOVASCULAR STRESS TEST: Status: ACTIVE | Noted: 2019-06-17

## 2019-06-17 LAB
STRESS ANGINA INDEX: 0
STRESS BASELINE HR: 61 BPM
STRESS ESTIMATED WORKLOAD: 8.6 METS
STRESS EXERCISE DUR MIN: NORMAL
STRESS PEAK DIAS BP: 108 MMHG
STRESS PEAK SYS BP: 217 MMHG
STRESS PERCENT HR ACHIEVED: 87 %
STRESS POST PEAK HR: 136 BPM
STRESS RATE PRESSURE PRODUCT: NORMAL BPM*MMHG
STRESS SR DUKE TREADMILL SCORE: 0
STRESS ST DEPRESSION: 0 MM
STRESS ST ELEVATION: 0 MM
STRESS TARGET HR: 156 BPM

## 2019-06-17 PROCEDURE — 93017 CV STRESS TEST TRACING ONLY: CPT

## 2019-06-17 PROCEDURE — 77067 SCR MAMMO BI INCL CAD: CPT

## 2019-06-17 PROCEDURE — 78452 HT MUSCLE IMAGE SPECT MULT: CPT

## 2019-06-17 PROCEDURE — 93225 XTRNL ECG REC<48 HRS REC: CPT

## 2019-06-17 RX ORDER — SODIUM CHLORIDE 0.9 % (FLUSH) 0.9 %
20 SYRINGE (ML) INJECTION
Status: COMPLETED | OUTPATIENT
Start: 2019-06-17 | End: 2019-06-17

## 2019-06-17 RX ADMIN — Medication 20 ML: at 10:13

## 2019-09-24 PROBLEM — Z00.00 WELLNESS EXAMINATION: Status: RESOLVED | Noted: 2018-01-09 | Resolved: 2019-09-24

## 2019-09-25 PROBLEM — Z12.11 ENCOUNTER FOR HEMOCCULT SCREENING: Status: RESOLVED | Noted: 2017-07-19 | Resolved: 2019-09-25

## 2019-10-24 ENCOUNTER — HOSPITAL ENCOUNTER (EMERGENCY)
Age: 64
Discharge: HOME | End: 2019-10-24
Attending: EMERGENCY MEDICINE
Payer: COMMERCIAL

## 2019-10-24 ENCOUNTER — APPOINTMENT (OUTPATIENT)
Dept: CT IMAGING | Age: 64
End: 2019-10-24
Attending: EMERGENCY MEDICINE
Payer: COMMERCIAL

## 2019-10-24 VITALS
WEIGHT: 204.15 LBS | HEART RATE: 62 BPM | TEMPERATURE: 98.3 F | HEIGHT: 61 IN | OXYGEN SATURATION: 100 % | BODY MASS INDEX: 38.54 KG/M2 | SYSTOLIC BLOOD PRESSURE: 174 MMHG | RESPIRATION RATE: 20 BRPM | DIASTOLIC BLOOD PRESSURE: 78 MMHG

## 2019-10-24 DIAGNOSIS — K59.00 CONSTIPATION, UNSPECIFIED CONSTIPATION TYPE: ICD-10-CM

## 2019-10-24 DIAGNOSIS — R10.32 ABDOMINAL PAIN, LLQ (LEFT LOWER QUADRANT): Primary | ICD-10-CM

## 2019-10-24 LAB
ALBUMIN SERPL-MCNC: 3.3 G/DL (ref 3.5–5)
ALBUMIN/GLOB SERPL: 0.9 {RATIO} (ref 1.1–2.2)
ALP SERPL-CCNC: 101 U/L (ref 45–117)
ALT SERPL-CCNC: 14 U/L (ref 12–78)
ANION GAP SERPL CALC-SCNC: 4 MMOL/L (ref 5–15)
APPEARANCE UR: CLEAR
AST SERPL-CCNC: 18 U/L (ref 15–37)
BACTERIA URNS QL MICRO: ABNORMAL /HPF
BASOPHILS # BLD: 0 K/UL (ref 0–0.1)
BASOPHILS NFR BLD: 1 % (ref 0–1)
BILIRUB SERPL-MCNC: 0.3 MG/DL (ref 0.2–1)
BILIRUB UR QL: NEGATIVE
BUN SERPL-MCNC: 13 MG/DL (ref 6–20)
BUN/CREAT SERPL: 15 (ref 12–20)
CALCIUM SERPL-MCNC: 8.7 MG/DL (ref 8.5–10.1)
CHLORIDE SERPL-SCNC: 112 MMOL/L (ref 97–108)
CO2 SERPL-SCNC: 26 MMOL/L (ref 21–32)
COLOR UR: ABNORMAL
CREAT SERPL-MCNC: 0.89 MG/DL (ref 0.55–1.02)
DIFFERENTIAL METHOD BLD: ABNORMAL
EOSINOPHIL # BLD: 0.2 K/UL (ref 0–0.4)
EOSINOPHIL NFR BLD: 2 % (ref 0–7)
EPITH CASTS URNS QL MICRO: ABNORMAL /LPF
ERYTHROCYTE [DISTWIDTH] IN BLOOD BY AUTOMATED COUNT: 15.2 % (ref 11.5–14.5)
GLOBULIN SER CALC-MCNC: 3.8 G/DL (ref 2–4)
GLUCOSE SERPL-MCNC: 101 MG/DL (ref 65–100)
GLUCOSE UR STRIP.AUTO-MCNC: NEGATIVE MG/DL
HCT VFR BLD AUTO: 39.9 % (ref 35–47)
HGB BLD-MCNC: 12.4 G/DL (ref 11.5–16)
HGB UR QL STRIP: NEGATIVE
IMM GRANULOCYTES # BLD AUTO: 0 K/UL (ref 0–0.04)
IMM GRANULOCYTES NFR BLD AUTO: 0 % (ref 0–0.5)
KETONES UR QL STRIP.AUTO: NEGATIVE MG/DL
LEUKOCYTE ESTERASE UR QL STRIP.AUTO: ABNORMAL
LYMPHOCYTES # BLD: 2 K/UL (ref 0.8–3.5)
LYMPHOCYTES NFR BLD: 31 % (ref 12–49)
MCH RBC QN AUTO: 25.4 PG (ref 26–34)
MCHC RBC AUTO-ENTMCNC: 31.1 G/DL (ref 30–36.5)
MCV RBC AUTO: 81.6 FL (ref 80–99)
MONOCYTES # BLD: 0.4 K/UL (ref 0–1)
MONOCYTES NFR BLD: 7 % (ref 5–13)
NEUTS SEG # BLD: 3.9 K/UL (ref 1.8–8)
NEUTS SEG NFR BLD: 59 % (ref 32–75)
NITRITE UR QL STRIP.AUTO: NEGATIVE
NRBC # BLD: 0 K/UL (ref 0–0.01)
NRBC BLD-RTO: 0 PER 100 WBC
PH UR STRIP: 7.5 [PH] (ref 5–8)
PLATELET # BLD AUTO: 274 K/UL (ref 150–400)
PMV BLD AUTO: 11.1 FL (ref 8.9–12.9)
POTASSIUM SERPL-SCNC: 4 MMOL/L (ref 3.5–5.1)
PROT SERPL-MCNC: 7.1 G/DL (ref 6.4–8.2)
PROT UR STRIP-MCNC: NEGATIVE MG/DL
RBC # BLD AUTO: 4.89 M/UL (ref 3.8–5.2)
RBC #/AREA URNS HPF: ABNORMAL /HPF (ref 0–5)
SODIUM SERPL-SCNC: 142 MMOL/L (ref 136–145)
SP GR UR REFRACTOMETRY: 1.02 (ref 1–1.03)
UA: UC IF INDICATED,UAUC: ABNORMAL
UROBILINOGEN UR QL STRIP.AUTO: 1 EU/DL (ref 0.2–1)
WBC # BLD AUTO: 6.6 K/UL (ref 3.6–11)
WBC URNS QL MICRO: ABNORMAL /HPF (ref 0–4)

## 2019-10-24 PROCEDURE — 85025 COMPLETE CBC W/AUTO DIFF WBC: CPT

## 2019-10-24 PROCEDURE — 81001 URINALYSIS AUTO W/SCOPE: CPT

## 2019-10-24 PROCEDURE — 96372 THER/PROPH/DIAG INJ SC/IM: CPT

## 2019-10-24 PROCEDURE — 74177 CT ABD & PELVIS W/CONTRAST: CPT

## 2019-10-24 PROCEDURE — 80053 COMPREHEN METABOLIC PANEL: CPT

## 2019-10-24 PROCEDURE — 96374 THER/PROPH/DIAG INJ IV PUSH: CPT

## 2019-10-24 PROCEDURE — 74011250636 HC RX REV CODE- 250/636: Performed by: EMERGENCY MEDICINE

## 2019-10-24 PROCEDURE — 36415 COLL VENOUS BLD VENIPUNCTURE: CPT

## 2019-10-24 PROCEDURE — 87086 URINE CULTURE/COLONY COUNT: CPT

## 2019-10-24 PROCEDURE — 74011636320 HC RX REV CODE- 636/320: Performed by: EMERGENCY MEDICINE

## 2019-10-24 PROCEDURE — 99284 EMERGENCY DEPT VISIT MOD MDM: CPT

## 2019-10-24 RX ORDER — DICYCLOMINE HYDROCHLORIDE 20 MG/1
20 TABLET ORAL EVERY 6 HOURS
Qty: 20 TAB | Refills: 0 | Status: SHIPPED | OUTPATIENT
Start: 2019-10-24 | End: 2019-10-29

## 2019-10-24 RX ORDER — DICYCLOMINE HYDROCHLORIDE 10 MG/ML
20 INJECTION INTRAMUSCULAR
Status: COMPLETED | OUTPATIENT
Start: 2019-10-24 | End: 2019-10-24

## 2019-10-24 RX ORDER — ONDANSETRON 2 MG/ML
4 INJECTION INTRAMUSCULAR; INTRAVENOUS
Status: COMPLETED | OUTPATIENT
Start: 2019-10-24 | End: 2019-10-24

## 2019-10-24 RX ORDER — POLYETHYLENE GLYCOL 3350, SODIUM SULFATE ANHYDROUS, SODIUM BICARBONATE, SODIUM CHLORIDE, POTASSIUM CHLORIDE 236; 22.74; 6.74; 5.86; 2.97 G/4L; G/4L; G/4L; G/4L; G/4L
4 POWDER, FOR SOLUTION ORAL
Qty: 4000 ML | Refills: 0 | Status: SHIPPED | OUTPATIENT
Start: 2019-10-24 | End: 2019-10-24

## 2019-10-24 RX ORDER — SODIUM CHLORIDE 0.9 % (FLUSH) 0.9 %
10 SYRINGE (ML) INJECTION
Status: COMPLETED | OUTPATIENT
Start: 2019-10-24 | End: 2019-10-24

## 2019-10-24 RX ADMIN — DICYCLOMINE HYDROCHLORIDE 20 MG: 20 INJECTION, SOLUTION INTRAMUSCULAR at 09:13

## 2019-10-24 RX ADMIN — IOHEXOL 50 ML: 240 INJECTION, SOLUTION INTRATHECAL; INTRAVASCULAR; INTRAVENOUS; ORAL at 08:29

## 2019-10-24 RX ADMIN — Medication 10 ML: at 10:04

## 2019-10-24 RX ADMIN — SODIUM CHLORIDE 1000 ML: 900 INJECTION, SOLUTION INTRAVENOUS at 09:24

## 2019-10-24 RX ADMIN — IOPAMIDOL 100 ML: 755 INJECTION, SOLUTION INTRAVENOUS at 10:04

## 2019-10-24 RX ADMIN — ONDANSETRON 4 MG: 2 INJECTION INTRAMUSCULAR; INTRAVENOUS at 09:13

## 2019-10-24 NOTE — ED NOTES
Bedside shift change report given to [address] (oncoming nurse) by Jatinder RN (offgoing nurse). Report included the following information SBAR, ED Summary, MAR and Recent Results. Assumed care of patient who is lying quietly on the stretcher in no apparent distress. Pt is alert and oriented x 4. Respirations are even and unlabored. No needs are expressed at this time. Call bell within reach. Side rails x 2. Cardiac monitor x 2. Stretcher locked in the lowest position. Will continue to monitor.

## 2019-10-24 NOTE — ED PROVIDER NOTES
EMERGENCY DEPARTMENT HISTORY AND PHYSICAL EXAM      Date: 10/24/2019  Patient Name: Obdulia East  Patient Age and Sex: 58 y.o. female    History of Presenting Illness     Chief Complaint   Patient presents with    Abdominal Pain     left side x 2 days, nauseous, denies vomiting or diarrhea, pt reports being recently having hard stools       History Provided By: Patient    HPI: Obdulia East, is a 58 y.o. female who has a remote history of a gastric bypass surgery presents to the emergency room with left lower quadrant abdominal pain. The pain initially began 2 days ago after she went and had a burger at [employer]. She describes the pain as cramping in nature, not radiating. She has noted increased gas as well, has tried taking Gas-X but with minimal relief. Typically, she has regular bowel movements but these also seem to be harder than normal.  Her last bowel movement was yesterday. No blood. She denies nausea, vomiting, fevers, chills or any  symptoms. Pt denies any other alleviating or exacerbating factors. There are no other complaints, changes or physical findings at this time. Past Medical History:   Diagnosis Date    RATLIFF (dyspnea on exertion) 06/03/2019    Encounter for Hemoccult screening 07/19/2017    neg    H/O cardiovascular stress test 06/17/2019    LVEF equals 59%. Left ventricular wall motion and thickening is normal    H/O gastric bypass 2003    md roshni    Hepatitis C     rx ended 9589,7245 viral load non detected, syl luna md    Hypertension     Normal cardiac stress test 6-2-06/ 6/17/19    LVEF equals 59%.  Left ventricular wall motion and thickening is normal    Obesity     Plantar fasciitis of right foot     Prediabetes     Pulmonary nodule, right 11-22-15  /  6-2-16    7mm  - repeat 6 mo  stable repeat 6/2/17  - [facility] rad   no change multiple pul nodules compare to [facility] 7/17/18    Renal cyst, right     S/P colonoscopy 2010   [facility] [address] examination 01/09/2018     Past Surgical History:   Procedure Laterality Date    HX COLONOSCOPY      HX GYN         PCP: Ana Laura Floyd MD    Past History   Past Medical History:  Past Medical History:   Diagnosis Date    RATLIFF (dyspnea on exertion) 06/03/2019    Encounter for Hemoccult screening 07/19/2017    neg    H/O cardiovascular stress test 06/17/2019    LVEF equals 59%. Left ventricular wall motion and thickening is normal    H/O gastric bypass 2003    md roshni    Hepatitis C     rx ended 4845,1607 viral load non detected, syl luna md    Hypertension     Normal cardiac stress test 6-2-06/ 6/17/19    LVEF equals 59%. Left ventricular wall motion and thickening is normal    Obesity     Plantar fasciitis of right foot     Prediabetes     Pulmonary nodule, right 11-22-15  /  6-2-16    7mm  - repeat 6 mo  stable repeat 6/2/17  - [facility] rad   no change multiple pul nodules compare to [facility] 7/17/18    Renal cyst, right     S/P colonoscopy 2010    Wellness examination 01/09/2018       Past Surgical History:  Past Surgical History:   Procedure Laterality Date    HX COLONOSCOPY      HX GYN         Family History:  Family History   Problem Relation Age of Onset    Cancer Mother        Social History:  Social History     Tobacco Use    Smoking status: Never Smoker    Smokeless tobacco: Never Used   Substance Use Topics    Alcohol use: Yes     Alcohol/week: 1.7 standard drinks     Types: 2 Glasses of wine per week     Comment: occasional    Drug use: No       Allergies: Allergies   Allergen Reactions    Codeine Hives       Current Medications:  No current facility-administered medications on file prior to encounter. Current Outpatient Medications on File Prior to Encounter   Medication Sig Dispense Refill    PARoxetine (PAXIL) 10 mg tablet Take 1 Tab by mouth daily.  30 Tab 3    hydroCHLOROthiazide (HYDRODIURIL) 12.5 mg tablet TAKE 1 TABLET BY MOUTH EVERY DAY 90 Tab 3    CYANOCOBALAMIN, VITAMIN B-12, (VITAMIN B-12 PO) Take 1 Tab by mouth daily.  multivitamin (ONE A DAY) tablet Take 1 Tab by mouth daily.  VITAMIN A PO Take 1 Tab by mouth daily.  mometasone (NASONEX) 50 mcg/actuation nasal spray 2 Sprays by Both Nostrils route daily. 1 Container 11       Review of Systems   Review of Systems   Constitutional: Negative. Negative for appetite change, chills and fever. HENT: Negative for congestion, ear pain, rhinorrhea, sinus pain, trouble swallowing and voice change. Respiratory: Negative for cough, chest tightness, shortness of breath, wheezing and stridor. Cardiovascular: Negative for chest pain, palpitations and leg swelling. Gastrointestinal: Positive for abdominal pain and constipation. Negative for blood in stool, diarrhea, nausea and vomiting. Genitourinary: Negative for difficulty urinating, dysuria, flank pain, frequency and hematuria. Musculoskeletal: Negative for arthralgias and joint swelling. Skin: Negative. Neurological: Negative for dizziness, syncope, weakness, numbness and headaches. All other systems reviewed and are negative. Physical Exam   Physical Exam   Constitutional: She is oriented to person, place, and time. She appears well-developed and well-nourished. HENT:   Head: Atraumatic. Mouth/Throat: Oropharynx is clear and moist.   Eyes: Pupils are equal, round, and reactive to light. Conjunctivae and EOM are normal. No scleral icterus. Neck: Normal range of motion. Neck supple. No JVD present. Cardiovascular: Normal rate, regular rhythm, normal heart sounds and intact distal pulses. Pulmonary/Chest: Effort normal and breath sounds normal. She exhibits no tenderness. Abdominal: Soft. Normal appearance and bowel sounds are normal. She exhibits no distension. There is no hepatosplenomegaly. There is tenderness (To deep palpation only) in the left lower quadrant.  There is no rigidity, no rebound, no guarding, no CVA tenderness, no tenderness at McBurney's point and negative Spears's sign. No hernia. Musculoskeletal: Normal range of motion. She exhibits no edema. Neurological: She is alert and oriented to person, place, and time. No cranial nerve deficit. Skin: Skin is warm and dry. She is not diaphoretic. Nursing note and vitals reviewed. Diagnostic Study Results     Labs -  Recent Results (from the past 24 hour(s))   METABOLIC PANEL, COMPREHENSIVE    Collection Time: 10/24/19  6:46 AM   Result Value Ref Range    Sodium 142 136 - 145 mmol/L    Potassium 4.0 3.5 - 5.1 mmol/L    Chloride 112 (H) 97 - 108 mmol/L    CO2 26 21 - 32 mmol/L    Anion gap 4 (L) 5 - 15 mmol/L    Glucose 101 (H) 65 - 100 mg/dL    BUN 13 6 - 20 MG/DL    Creatinine 0.89 0.55 - 1.02 MG/DL    BUN/Creatinine ratio 15 12 - 20      GFR est AA >60 >60 ml/min/1.73m2    GFR est non-AA >60 >60 ml/min/1.73m2    Calcium 8.7 8.5 - 10.1 MG/DL    Bilirubin, total 0.3 0.2 - 1.0 MG/DL    ALT (SGPT) 14 12 - 78 U/L    AST (SGOT) 18 15 - 37 U/L    Alk. phosphatase 101 45 - 117 U/L    Protein, total 7.1 6.4 - 8.2 g/dL    Albumin 3.3 (L) 3.5 - 5.0 g/dL    Globulin 3.8 2.0 - 4.0 g/dL    A-G Ratio 0.9 (L) 1.1 - 2.2     CBC WITH AUTOMATED DIFF    Collection Time: 10/24/19  6:46 AM   Result Value Ref Range    WBC 6.6 3.6 - 11.0 K/uL    RBC 4.89 3.80 - 5.20 M/uL    HGB 12.4 11.5 - 16.0 g/dL    HCT 39.9 35.0 - 47.0 %    MCV 81.6 80.0 - 99.0 FL    MCH 25.4 (L) 26.0 - 34.0 PG    MCHC 31.1 30.0 - 36.5 g/dL    RDW 15.2 (H) 11.5 - 14.5 %    PLATELET 093 044 - 959 K/uL    MPV 11.1 8.9 - 12.9 FL    NRBC 0.0 0  WBC    ABSOLUTE NRBC 0.00 0.00 - 0.01 K/uL    NEUTROPHILS 59 32 - 75 %    LYMPHOCYTES 31 12 - 49 %    MONOCYTES 7 5 - 13 %    EOSINOPHILS 2 0 - 7 %    BASOPHILS 1 0 - 1 %    IMMATURE GRANULOCYTES 0 0.0 - 0.5 %    ABS. NEUTROPHILS 3.9 1.8 - 8.0 K/UL    ABS. LYMPHOCYTES 2.0 0.8 - 3.5 K/UL    ABS. MONOCYTES 0.4 0.0 - 1.0 K/UL    ABS.  EOSINOPHILS 0.2 0.0 - 0.4 K/UL    ABS. BASOPHILS 0.0 0.0 - 0.1 K/UL    ABS. IMM. GRANS. 0.0 0.00 - 0.04 K/UL    DF AUTOMATED     URINALYSIS W/ REFLEX CULTURE    Collection Time: 10/24/19  7:39 AM   Result Value Ref Range    Color YELLOW/STRAW      Appearance CLEAR CLEAR      Specific gravity 1.019 1.003 - 1.030      pH (UA) 7.5 5.0 - 8.0      Protein NEGATIVE  NEG mg/dL    Glucose NEGATIVE  NEG mg/dL    Ketone NEGATIVE  NEG mg/dL    Bilirubin NEGATIVE  NEG      Blood NEGATIVE  NEG      Urobilinogen 1.0 0.2 - 1.0 EU/dL    Nitrites NEGATIVE  NEG      Leukocyte Esterase SMALL (A) NEG      WBC PENDING /hpf    RBC PENDING /hpf    Epithelial cells PENDING /lpf    Bacteria PENDING /hpf    UA:UC IF INDICATED PENDING        Radiologic Studies -   CT ABD PELV W CONT   Final Result   IMPRESSION:   No acute findings. Medical Decision Making   I am the first provider for this patient. Records Reviewed: I reviewed our electronic medical record system for any past medical records that were available that may contribute to the patient's current condition, including their PMH, surgical history, social and family history. Reviewed the nursing notes and vital signs from today's visit. Vital Signs-Reviewed the patient's vital signs. Patient Vitals for the past 24 hrs:   Temp Pulse Resp BP SpO2   10/24/19 0625 98.3 °F (36.8 °C) 62 20 157/71 100 %       Provider Notes (Medical Decision Making):   Pt presents with abdominal pain; vital signs stable with currently a non-peritoneal exam; DDx includes: IBD, diverticulitis, constipation, AAA or descending dissection, ACS, colitis. Will get labs, treat symptomatically and obtain serial abdominal exams to determine if additional imaging is indicated. Will reassess and monitor closely. ED Course:   Initial assessment performed. The patients presenting problems have been discussed, and they are in agreement with the care plan formulated and outlined with them.   I have encouraged them to ask questions as they arise throughout their visit. Progress note:  Patient has been reassessed and reports feeling better, has normal vital signs, abdominal exam still benign, she feels comfortable going home. I think this is reasonable as no findings today suggest a life-threatening condition. Her pain may be due to constipation versus gas; she has told me that she has trouble with gas pains in the past and this may just represent a more severe episode. She has had more difficulty passing her stools as well, so I will prescribe her a p.o. laxative to take home today. She may also benefit from a course of Bentyl which I will give her also. Of note, her urinalysis today shows a possible UTI. The patient however has no symptoms of a urinary tract infection and the UA results may be a contaminant rather than an actual bladder infection. I have discussed this with the patient and both she and I agree to wait for the urine culture and treat should the culture be positive. DISPOSITION: DISCHARGE  The patient's results have been reviewed with patient and available family and/or caregiver. They verbally convey their understanding and agreement of the patient's signs, symptoms, diagnosis, treatment and prognosis and additionally agree to follow up as recommended in the discharge instructions or to return to the Emergency Department should the patient's condition change prior to their follow-up appointment. The patient and available family and/or caregiver verbally agree with the care plan and all of their questions have been answered. The discharge instructions have also been provided to the them with educational information regarding the patient's diagnosis as well a list of reasons why the patient would want to return to the ER prior to their follow-up appointment should any concerns arise, the patient's condition change or symptoms worsen. Lexy Bull MD, MSc      Diagnosis     Clinical Impression:   1.  Abdominal pain, LLQ (left lower quadrant)    2. Constipation, unspecified constipation type        Attestation:  I personally performed the services described in this documentation on this date 10/24/2019 for patient Natalia Brewer. Timoteo Elder MD    Please note that this dictation was completed with SnapOne, the computer voice recognition software. Quite often unanticipated grammatical, syntax, homophones, and other interpretive errors are inadvertently transcribed by the computer software. Please disregard these errors. Please excuse any errors that have escaped final proofreading.

## 2019-10-24 NOTE — LETTER
[address] 
[facility] EMERGENCY DEPT 
[address] [facility] [place] [address] 
[phone] Work/School Note Date: 10/24/2019 To Whom It May concern: 
 
Rachael Matson was seen and treated today in the emergency room by the following provider(s): 
Attending Provider: Ursula Sainz MD.   
 
Rachael Matson may return to work on 10/28/19. Sincerely, Tremayne Pearl MD

## 2019-10-24 NOTE — DISCHARGE INSTRUCTIONS
Patient Education     -- Take a stool softener daily whenever you have harder stools   -- Eat plenty of vegetables and stay away from processed foods  -- Drink the bottle of magnesium citrate this evening;   -- Take bentyl daily for the next 14 days     Abdominal Pain: Care Instructions  Your Care Instructions    Abdominal pain has many possible causes. Some aren't serious and get better on their own in a few days. Others need more testing and treatment. If your pain continues or gets worse, you need to be rechecked and may need more tests to find out what is wrong. You may need surgery to correct the problem. Don't ignore new symptoms, such as fever, nausea and vomiting, urination problems, pain that gets worse, and dizziness. These may be signs of a more serious problem. Your doctor may have recommended a follow-up visit in the next 8 to 12 hours. If you are not getting better, you may need more tests or treatment. The doctor has checked you carefully, but problems can develop later. If you notice any problems or new symptoms, get medical treatment right away. Follow-up care is a key part of your treatment and safety. Be sure to make and go to all appointments, and call your doctor if you are having problems. It's also a good idea to know your test results and keep a list of the medicines you take. How can you care for yourself at home? · Rest until you feel better. · To prevent dehydration, drink plenty of fluids, enough so that your urine is light yellow or clear like water. Choose water and other caffeine-free clear liquids until you feel better. If you have kidney, heart, or liver disease and have to limit fluids, talk with your doctor before you increase the amount of fluids you drink. · If your stomach is upset, eat mild foods, such as rice, dry toast or crackers, bananas, and applesauce. Try eating several small meals instead of two or three large ones.   · Wait until 48 hours after all symptoms have gone away before you have spicy foods, alcohol, and drinks that contain caffeine. · Do not eat foods that are high in fat. · Avoid anti-inflammatory medicines such as aspirin, ibuprofen (Advil, Motrin), and naproxen (Aleve). These can cause stomach upset. Talk to your doctor if you take daily aspirin for another health problem. When should you call for help? Call 911 anytime you think you may need emergency care. For example, call if:    · You passed out (lost consciousness).     · You pass maroon or very bloody stools.     · You vomit blood or what looks like coffee grounds.     · You have new, severe belly pain.    Call your doctor now or seek immediate medical care if:    · Your pain gets worse, especially if it becomes focused in one area of your belly.     · You have a new or higher fever.     · Your stools are black and look like tar, or they have streaks of blood.     · You have unexpected vaginal bleeding.     · You have symptoms of a urinary tract infection. These may include:  ? Pain when you urinate. ? Urinating more often than usual.  ? Blood in your urine.     · You are dizzy or lightheaded, or you feel like you may faint.    Watch closely for changes in your health, and be sure to contact your doctor if:    · You are not getting better after 1 day (24 hours). Where can you learn more? Go to http://nely-dolores.info/. Enter V007 in the search box to learn more about \"Abdominal Pain: Care Instructions. \"  Current as of: June 26, 2019  Content Version: 12.2  © 0489-0845 [employer]. Care instructions adapted under license by [employer] (which disclaims liability or warranty for this information). If you have questions about a medical condition or this instruction, always ask your healthcare professional. [address] any warranty or liability for your use of this information.

## 2019-10-25 LAB
BACTERIA SPEC CULT: NORMAL
CC UR VC: NORMAL
SERVICE CMNT-IMP: NORMAL

## 2019-10-28 ENCOUNTER — OFFICE VISIT (OUTPATIENT)
Dept: INTERNAL MEDICINE CLINIC | Age: 64
End: 2019-10-28

## 2019-10-28 VITALS
HEART RATE: 58 BPM | SYSTOLIC BLOOD PRESSURE: 132 MMHG | DIASTOLIC BLOOD PRESSURE: 84 MMHG | HEIGHT: 61 IN | OXYGEN SATURATION: 98 % | TEMPERATURE: 97.7 F | RESPIRATION RATE: 16 BRPM | BODY MASS INDEX: 38.5 KG/M2 | WEIGHT: 203.9 LBS

## 2019-10-28 DIAGNOSIS — B18.2 CHRONIC HEPATITIS C WITHOUT HEPATIC COMA (HCC): ICD-10-CM

## 2019-10-28 DIAGNOSIS — N39.0 URINARY TRACT INFECTION WITHOUT HEMATURIA, SITE UNSPECIFIED: ICD-10-CM

## 2019-10-28 DIAGNOSIS — E66.09 CLASS 2 OBESITY DUE TO EXCESS CALORIES WITHOUT SERIOUS COMORBIDITY WITH BODY MASS INDEX (BMI) OF 37.0 TO 37.9 IN ADULT: ICD-10-CM

## 2019-10-28 DIAGNOSIS — R73.03 PREDIABETES: ICD-10-CM

## 2019-10-28 DIAGNOSIS — I10 ESSENTIAL HYPERTENSION: Primary | ICD-10-CM

## 2019-10-28 NOTE — PROGRESS NOTES
SPORTS MEDICINE AND PRIMARY CARE  Stephen Vickers MD, [address] [address]  Phone:  [phone]  Fax: [phone]       Chief Complaint   Patient presents with   [facility] Follow-up    Hypertension   . SUBJECTIVE:    Evans Eisenmenger is a 71 y.o. female Patient returns today following admission to the ER on 10/24/19, where she presented with left sided two days of abdominal pain, nauseousness, and recently having hard stools. She was evaluated in the emergency room and had a CT scan that was remarkable for no acute findings, bilateral renal cysts, and otherwise unremarkable. She was subsequently released with Bentyl after having also a CMP that was unremarkable and urine that revealed small leukocyte esterase, and a CBC that was normal.  She has a known history of primary hypertension, prediabetes. CT on 07/17/18 of the chest was stable _____________ right lung nodules, therefore no further CT scans were requested. Hepatitis C, treated, obesity, and is seen for evaluation. Patient is feeling better now, she took Gas-X with relief. She thinks it was the onions in the hamburger from [employer]. We recall she had a birthday on [birth year] and we wish her a happy birthday, 71 years. Patient is seen for evaluation. Current Outpatient Medications   Medication Sig Dispense Refill    dicyclomine (BENTYL) 20 mg tablet Take 1 Tab by mouth every six (6) hours for 20 doses. 20 Tab 0    hydroCHLOROthiazide (HYDRODIURIL) 12.5 mg tablet TAKE 1 TABLET BY MOUTH EVERY DAY 90 Tab 3    CYANOCOBALAMIN, VITAMIN B-12, (VITAMIN B-12 PO) Take 1 Tab by mouth daily.  multivitamin (ONE A DAY) tablet Take 1 Tab by mouth daily.  VITAMIN A PO Take 1 Tab by mouth daily.  mometasone (NASONEX) 50 mcg/actuation nasal spray 2 Sprays by Both Nostrils route daily. 1 Container 11    PARoxetine (PAXIL) 10 mg tablet Take 1 Tab by mouth daily.  30 Tab 3     Past Medical History: Diagnosis Date    RATLIFF (dyspnea on exertion) 06/03/2019    Encounter for Hemoccult screening 07/19/2017    neg    H/O cardiovascular stress test 06/17/2019    LVEF equals 59%. Left ventricular wall motion and thickening is normal    H/O gastric bypass 2003    md roshni    Hepatitis C     rx ended [id number] viral load non detected, syl luna md    Hypertension     Normal cardiac stress test 6-2-06/ 6/17/19    LVEF equals 59%.  Left ventricular wall motion and thickening is normal    Obesity     Plantar fasciitis of right foot     Prediabetes     Pulmonary nodule, right 11-22-15  /  6-2-16    7mm  - repeat 6 mo  stable repeat 6/2/17  - [facility] rad   no change multiple pul nodules [facility] to [facility] 7/17/18    Renal cyst, right     S/P colonoscopy 2010    Wellness examination 01/09/2018     Past Surgical History:   Procedure Laterality Date    HX COLONOSCOPY      HX GYN       Allergies   Allergen Reactions    Codeine Hives         REVIEW OF SYSTEMS:  General: negative for - chills or fever  ENT: negative for - headaches, nasal congestion or tinnitus  Respiratory: negative for - cough, hemoptysis, shortness of breath or wheezing  Cardiovascular : negative for - chest pain, edema, palpitations or shortness of breath  Gastrointestinal: negative for - abdominal pain, blood in stools, heartburn or nausea/vomiting  Genito-Urinary: no dysuria, trouble voiding, or hematuria  Musculoskeletal: negative for - gait disturbance, joint pain, joint stiffness or joint swelling  Neurological: no TIA or stroke symptoms  Hematologic: no bruises, no bleeding, no swollen glands  Integument: no lumps, mole changes, nail changes or rash  Endocrine: no malaise/lethargy or unexpected weight changes      Social History     Socioeconomic History    Marital status:      Spouse name: Not on file    Number of children: Not on file    Years of education: Not on file    Highest education level: Not on file   Tobacco Use  Smoking status: Never Smoker    Smokeless tobacco: Never Used   Substance and Sexual Activity    Alcohol use: Yes     Alcohol/week: 1.7 standard drinks     Types: 2 Glasses of wine per week     Comment: occasional    Drug use: No    Sexual activity: Yes     Partners: Male   Social History Narrative    Medical History: hepatitis C - how urbano luna mdUpper pole renal cystGastroesophageal reflux diseaseVenous stasis ulcersHistory of phlebitisJoe    joint disease kneeShe had endometriosisObesitynegative stress Cardiolite 2006 ejection fraction 62%Family history ovarian    cancerSummer  ophthalmological evaluation    Gyn History: Last pap date 2014. Surgical History: arthroscopic long limb gastric bypass w ith Tom-en-Y gastrojejunostomy md bryn 2003colonoscopy     Hospitalization/Major Diagnostic Procedure: Denies Past Hospitalization    Family History: Mother:  79 yrs Ovarian cancer w ith metastatic disease Father:  80, ca appendix  Sister(s): alive Brother(s): alive Daughter(s):    alive Son(s): alive 3 brother(s) wai saldana - [facility] mesentery, 1 sister(s) . 1 son(s) , 1 daughter(s) . Social History: Alcohol Use Patient does not use alcohol. Smoking Status Patient is a never smoker. Marital Status: W idow , W idow . Lives    w ith: alone. Occupation/W ork: employed full time teacher. Education/School: has highschool diploma, has college diploma [employer]. Family History   Problem Relation Age of Onset   [de-identified] Cancer Mother        OBJECTIVE:    Visit Vitals  /76   Pulse (!) 58   Temp 97.7 °F (36.5 °C) (Oral)   Resp 16   Ht 5' 1\" (1.549 m)   Wt 203 lb 14.4 oz (92.5 kg)   SpO2 98%   BMI 38.53 kg/m²     CONSTITUTIONAL: well , well nourished, appears age appropriate  EYES: perrla, eom intact  ENMT:moist mucous membranes, pharynx clear  NECK: supple.  Thyroid normal  RESPIRATORY: Chest: clear bilaterally   CARDIOVASCULAR: Heart: regular rate and rhythm  GASTROINTESTINAL: Abdomen: soft, bowel sounds active  HEMATOLOGIC: no pathological lymph nodes palpated  MUSCULOSKELETAL: Extremities: no edema, pulse 1+   INTEGUMENT: No unusual rashes or suspicious skin lesions noted. Nails appear normal.  NEUROLOGIC: non-focal exam   MENTAL STATUS: alert and oriented, appropriate affect           ASSESSMENT:  1. Essential hypertension    2. Prediabetes    3. Chronic hepatitis C without hepatic coma (HCC)    4. Class 2 obesity due to excess calories without serious comorbidity with body mass index (BMI) of 37.0 to 37.9 in adult    5. Urinary tract infection without hematuria, site unspecified      I think the abdominal pain that prompted the visit to the ER was dietary induced. I think she can stop Gas-X and go back to her normal medications. I would suggest that she avoid those types of foods in the future, particularly since she celebrated her 64th birthday and has ______________ junk food. Known history of prediabetes, no adjustment will be made. She has primary hypertension, which is adequately controlled. BP repeated at 132/84. Hepatitis C has been treated. She had pulmonary nodule with CT scan done at the imaging center. If the results have come back, they have not reached the chart, I do not remember seeing the results, although they could have come back and _______________. Will await those results then. Since we last saw her, her BMI reflects a 3 lb weight loss, so we encouraged her to get out of the 200 club by the next time we see her and hopefully she will. She will be back to see us in four to six months, sooner if she has any problems. I have discussed the diagnosis with the patient and the intended plan as seen in the  orders above. The patient understands and agees with the plan.   The patient has   received an after visit summary and questions were answered concerning  future plans  Patient labs and/or xrays were reviewed  Past records were reviewed. PLAN:  .  Orders Placed This Encounter    UA WITH REFLEX MICRO AND CULTURE       Follow-up and Dispositions    · Return in about 4 months (around 2/28/2020). ATTENTION:   This medical record was transcribed using an electronic medical records system. Although proofread, it may and can contain electronic and spelling errors. Other human spelling and other errors may be present. Corrections may be executed at a later time. Please feel free to contact us for any clarifications as needed.

## 2019-10-31 LAB
APPEARANCE UR: ABNORMAL
BACTERIA #/AREA URNS HPF: ABNORMAL /[HPF]
BACTERIA UR CULT: NORMAL
BILIRUB UR QL STRIP: NEGATIVE
CASTS URNS MICRO: ABNORMAL
CASTS URNS QL MICRO: PRESENT /LPF
COLOR UR: YELLOW
EPI CELLS #/AREA URNS HPF: ABNORMAL /HPF (ref 0–10)
GLUCOSE UR QL: NEGATIVE
HGB UR QL STRIP: ABNORMAL
KETONES UR QL STRIP: NEGATIVE
LEUKOCYTE ESTERASE UR QL STRIP: ABNORMAL
MICRO URNS: ABNORMAL
MUCOUS THREADS URNS QL MICRO: PRESENT
NITRITE UR QL STRIP: NEGATIVE
PH UR STRIP: 5 [PH] (ref 5–7.5)
PROT UR QL STRIP: NEGATIVE
RBC #/AREA URNS HPF: ABNORMAL /HPF (ref 0–2)
SP GR UR: 1.03 (ref 1–1.03)
URINALYSIS REFLEX, 377202: ABNORMAL
UROBILINOGEN UR STRIP-MCNC: 0.2 MG/DL (ref 0.2–1)
WBC #/AREA URNS HPF: ABNORMAL /HPF (ref 0–5)

## 2019-10-31 RX ORDER — CEFUROXIME AXETIL 500 MG/1
500 TABLET ORAL 2 TIMES DAILY
Qty: 14 TAB | Refills: 0 | Status: SHIPPED | OUTPATIENT
Start: 2019-10-31 | End: 2019-11-07

## 2019-11-17 LAB — BACTERIA UR CULT: NORMAL

## 2019-12-05 ENCOUNTER — OFFICE VISIT (OUTPATIENT)
Dept: INTERNAL MEDICINE CLINIC | Age: 64
End: 2019-12-05

## 2019-12-05 VITALS
SYSTOLIC BLOOD PRESSURE: 142 MMHG | OXYGEN SATURATION: 99 % | HEART RATE: 62 BPM | WEIGHT: 199.8 LBS | RESPIRATION RATE: 18 BRPM | DIASTOLIC BLOOD PRESSURE: 87 MMHG | HEIGHT: 61 IN | BODY MASS INDEX: 37.72 KG/M2 | TEMPERATURE: 97.5 F

## 2019-12-05 DIAGNOSIS — J04.0 LARYNGITIS: ICD-10-CM

## 2019-12-05 DIAGNOSIS — E66.09 CLASS 2 OBESITY DUE TO EXCESS CALORIES WITHOUT SERIOUS COMORBIDITY WITH BODY MASS INDEX (BMI) OF 37.0 TO 37.9 IN ADULT: ICD-10-CM

## 2019-12-05 DIAGNOSIS — Z98.84 H/O GASTRIC BYPASS: ICD-10-CM

## 2019-12-05 DIAGNOSIS — I10 ESSENTIAL HYPERTENSION: Primary | ICD-10-CM

## 2019-12-05 DIAGNOSIS — E66.9 EXCESSIVE SUBCUTANEOUS FAT: ICD-10-CM

## 2019-12-05 DIAGNOSIS — R73.03 PREDIABETES: ICD-10-CM

## 2019-12-05 RX ORDER — HYDROCHLOROTHIAZIDE 12.5 MG/1
TABLET ORAL
Qty: 90 TAB | Refills: 3 | Status: SHIPPED | OUTPATIENT
Start: 2019-12-05 | End: 2020-06-15 | Stop reason: SDUPTHER

## 2019-12-05 RX ORDER — FLUTICASONE PROPIONATE 50 MCG
2 SPRAY, SUSPENSION (ML) NASAL DAILY
Qty: 1 BOTTLE | Refills: 11 | Status: SHIPPED | OUTPATIENT
Start: 2019-12-05

## 2019-12-05 NOTE — PROGRESS NOTES
Chief Complaint   Patient presents with    Cold Symptoms    Sore Throat     1. Have you been to the ER, urgent care clinic since your last visit? Hospitalized since your last visit? No    2. Have you seen or consulted any other health care providers outside of the [address] [place] [address] since your last visit? Include any pap smears or colon screening.  No

## 2019-12-05 NOTE — PATIENT INSTRUCTIONS
Body Mass Index: Care Instructions Your Care Instructions Body mass index (BMI) can help you see if your weight is raising your risk for health problems. It uses a formula to compare how much you weigh with how tall you are. · A BMI lower than 18.5 is considered underweight. · A BMI between 18.5 and 24.9 is considered healthy. · A BMI between 25 and 29.9 is considered overweight. A BMI of 30 or higher is considered obese. If your BMI is in the normal range, it means that you have a lower risk for weight-related health problems. If your BMI is in the overweight or obese range, you may be at increased risk for weight-related health problems, such as high blood pressure, heart disease, stroke, arthritis or joint pain, and diabetes. If your BMI is in the underweight range, you may be at increased risk for health problems such as fatigue, lower protection (immunity) against illness, muscle loss, bone loss, hair loss, and hormone problems. BMI is just one measure of your risk for weight-related health problems. You may be at higher risk for health problems if you are not active, you eat an unhealthy diet, or you drink too much alcohol or use tobacco products. Follow-up care is a key part of your treatment and safety. Be sure to make and go to all appointments, and call your doctor if you are having problems. It's also a good idea to know your test results and keep a list of the medicines you take. How can you care for yourself at home? · Practice healthy eating habits. This includes eating plenty of fruits, vegetables, whole grains, lean protein, and low-fat dairy. · If your doctor recommends it, get more exercise. Walking is a good choice. Bit by bit, increase the amount you walk every day. Try for at least 30 minutes on most days of the week. · Do not smoke. Smoking can increase your risk for health problems.  If you need help quitting, talk to your doctor about stop-smoking programs and medicines. These can increase your chances of quitting for good. · Limit alcohol to 2 drinks a day for men and 1 drink a day for women. Too much alcohol can cause health problems. If you have a BMI higher than 25 · Your doctor may do other tests to check your risk for weight-related health problems. This may include measuring the distance around your waist. A waist measurement of more than 40 inches in men or 35 inches in women can increase the risk of weight-related health problems. · Talk with your doctor about steps you can take to stay healthy or improve your health. You may need to make lifestyle changes to lose weight and stay healthy, such as changing your diet and getting regular exercise. If you have a BMI lower than 18.5 · Your doctor may do other tests to check your risk for health problems. · Talk with your doctor about steps you can take to stay healthy or improve your health. You may need to make lifestyle changes to gain or maintain weight and stay healthy, such as getting more healthy foods in your diet and doing exercises to build muscle. Where can you learn more? Go to http://nely-dolores.info/. Enter S176 in the search box to learn more about \"Body Mass Index: Care Instructions. \" Current as of: October 13, 2016 Content Version: 11.4 © 5723-9960 [employer]. Care instructions adapted under license by [employer] (which disclaims liability or warranty for this information). If you have questions about a medical condition or this instruction, always ask your healthcare professional. [address] any warranty or liability for your use of this information.

## 2019-12-05 NOTE — PROGRESS NOTES
SPORTS MEDICINE AND PRIMARY CARE  Stephen Vickers MD, [place] [address]  Phone:  [phone]  Fax: [phone]       Chief Complaint   Patient presents with    Cold Symptoms    Sore Throat   . SUBJECTIVE:    Evans Eisenmenger is a 71 y.o. female Patient returns today with known history of primary hypertension, status post gastric bypass, obesity, prediabetes, and is seen for evaluation. Patient comes in complaining of a week history of laryngitis, she just cannot talk. 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 No cough, no fever, not coughing up anything, and is seen for evaluation. Current Outpatient Medications   Medication Sig Dispense Refill    sodium chloride (OCEAN) 0.65 % nasal squeeze bottle 0.1 mL by Both Nostrils route four (4) times daily. 45 mL 11    fluticasone propionate (FLONASE) 50 mcg/actuation nasal spray 2 Sprays by Both Nostrils route daily. 1 Bottle 11    hydroCHLOROthiazide (HYDRODIURIL) 12.5 mg tablet TAKE 1 TABLET BY MOUTH EVERY DAY 90 Tab 3    CYANOCOBALAMIN, VITAMIN B-12, (VITAMIN B-12 PO) Take 1 Tab by mouth daily.  multivitamin (ONE A DAY) tablet Take 1 Tab by mouth daily.  VITAMIN A PO Take 1 Tab by mouth daily.  PARoxetine (PAXIL) 10 mg tablet Take 1 Tab by mouth daily. 30 Tab 3     Past Medical History:   Diagnosis Date    RATLIFF (dyspnea on exertion) 06/03/2019    Encounter for Hemoccult screening 07/19/2017    neg    H/O cardiovascular stress test 06/17/2019    LVEF equals 59%. Left ventricular wall motion and thickening is normal    H/O gastric bypass 2003    md roshni    Hepatitis C     rx ended [id number] viral load non detected, syl luna md    Hypertension     Laryngitis     Normal cardiac stress test 6-2-06/ 6/17/19    LVEF equals 59%.  Left ventricular wall motion and thickening is normal    Obesity     Plantar fasciitis of right foot     Prediabetes     Pulmonary nodule, right 11-22-15  /  6-2-16    7mm  - repeat 6 mo  stable repeat 6/2/17  - [facility] rad   no change multiple pul nodules [facility] to [facility] 7/17/18    Renal cyst, right     S/P colonoscopy 2010    Wellness examination 01/09/2018     Past Surgical History:   Procedure Laterality Date    HX COLONOSCOPY      HX GYN       Allergies   Allergen Reactions    Codeine Hives         REVIEW OF SYSTEMS:  General: negative for - chills or fever  ENT: negative for - headaches, nasal congestion or tinnitus  Respiratory: negative for - cough, hemoptysis, shortness of breath or wheezing  Cardiovascular : negative for - chest pain, edema, palpitations or shortness of breath  Gastrointestinal: negative for - abdominal pain, blood in stools, heartburn or nausea/vomiting  Genito-Urinary: no dysuria, trouble voiding, or hematuria  Musculoskeletal: negative for - gait disturbance, joint pain, joint stiffness or joint swelling  Neurological: no TIA or stroke symptoms  Hematologic: no bruises, no bleeding, no swollen glands  Integument: no lumps, mole changes, nail changes or rash  Endocrine: no malaise/lethargy or unexpected weight changes      Social History     Socioeconomic History    Marital status:      Spouse name: Not on file    Number of children: Not on file    Years of education: Not on file    Highest education level: Not on file   Tobacco Use    Smoking status: Never Smoker    Smokeless tobacco: Never Used   Substance and Sexual Activity    Alcohol use:  Yes     Alcohol/week: 1.7 standard drinks     Types: 2 Glasses of wine per week     Comment: occasional    Drug use: No    Sexual activity: Yes     Partners: Male   Social History Narrative    Medical History: hepatitis C - how urbano luna mdUpper pole renal cystGastroesophageal reflux diseaseVenous stasis ulcersHistory of phlebitisJoe    joint disease kneeShe had endometriosisObesitynegative stress Cardiolite June 8, 2006 ejection fraction 62%[facility]    Gyn History: Last pap date 2014. Surgical History: arthroscopic long limb gastric bypass w ith Tom-en-Y gastrojejunostomy md bryn 2003colonoscopy     Hospitalization/Major Diagnostic Procedure: Denies Past Hospitalization    Family History: Mother:  79 yrs Ovarian cancer w ith metastatic disease Father:  80, ca appendix  Sister(s): alive Brother(s): alive Daughter(s):    alive Son(s): alive 3 brother(s) wai saldana - [facility], 1 sister(s) . 1 son(s) , 1 daughter(s) . Social History: Alcohol Use Patient does not use alcohol. Smoking Status Patient is a never smoker. Marital Status: W idow , W idow . Lives    w ith: alone. Occupation/W ork: employed full time teacher. Education/School: has highschool diploma, has college diploma [employer]. Family History   Problem Relation Age of Onset    Cancer Mother        OBJECTIVE:    Visit Vitals  /87   Pulse 62   Temp 97.5 °F (36.4 °C) (Oral)   Resp 18   Ht 5' 1\" (1.549 m)   Wt 199 lb 12.8 oz (90.6 kg)   SpO2 99%   BMI 37.75 kg/m²     CONSTITUTIONAL: well , well nourished, appears age appropriate  EYES: perrla, eom intact  ENMT:moist mucous membranes, pharynx clear  NECK: supple. Thyroid normal  RESPIRATORY: Chest: clear bilaterally   CARDIOVASCULAR: Heart: regular rate and rhythm  GASTROINTESTINAL: Abdomen: soft, bowel sounds active  HEMATOLOGIC: no pathological lymph nodes palpated  MUSCULOSKELETAL: Extremities: no edema, pulse 1+   INTEGUMENT: No unusual rashes or suspicious skin lesions noted. Nails appear normal.  NEUROLOGIC: non-focal exam   MENTAL STATUS: alert and oriented, appropriate affect           ASSESSMENT:  1. Essential hypertension    2. H/O gastric bypass    3. Class 2 obesity due to excess calories without serious comorbidity with body mass index (BMI) of 37.0 to 37.9 in adult    4. Prediabetes    5.  Laryngitis      Repeat BP is 128/80, which is completely acceptable at the wrist.    She has a history of gastric bypass and as a result is dissatisfied with the appearance of her upper arm. Will refer her to the proper surgeon who she wanted to see. Her BMI reflects obesity, but she is finally out of the 200 club and we congratulate her. We encouraged her to continue her weight loss program.  She is doing an excellent job in spite of the fact that since we last saw her she had Thanksgiving dinner, which she said was very good. Prediabetes is stable. She has laryngitis and will treat her symptomatically with humidification. After 3-4 weeks if it does not resolve she will need to see an ENT specialist to look at her vocal cords. Discussed the patient's BMI with her. The BMI follow up plan is as follows:     dietary management education, guidance, and counseling  encourage exercise  monitor weight  prescribed dietary intake    I have discussed the diagnosis with the patient and the intended plan as seen in the  orders above. The patient understands and agees with the plan. The patient has   received an after visit summary and questions were answered concerning  future plans  Patient labs and/or xrays were reviewed  Past records were reviewed. PLAN:  .  Orders Placed This Encounter    sodium chloride (OCEAN) 0.65 % nasal squeeze bottle    fluticasone propionate (FLONASE) 50 mcg/actuation nasal spray    hydroCHLOROthiazide (HYDRODIURIL) 12.5 mg tablet       Follow-up and Dispositions    · Return in about 4 months (around 4/5/2020). ATTENTION:   This medical record was transcribed using an electronic medical records system. Although proofread, it may and can contain electronic and spelling errors. Other human spelling and other errors may be present. Corrections may be executed at a later time. Please feel free to contact us for any clarifications as needed. Tanvi Mora

## 2019-12-30 ENCOUNTER — OFFICE VISIT (OUTPATIENT)
Dept: INTERNAL MEDICINE CLINIC | Age: 64
End: 2019-12-30

## 2019-12-30 VITALS
SYSTOLIC BLOOD PRESSURE: 120 MMHG | OXYGEN SATURATION: 98 % | TEMPERATURE: 98.3 F | HEIGHT: 61 IN | BODY MASS INDEX: 36.89 KG/M2 | RESPIRATION RATE: 18 BRPM | DIASTOLIC BLOOD PRESSURE: 68 MMHG | WEIGHT: 195.4 LBS | HEART RATE: 67 BPM

## 2019-12-30 DIAGNOSIS — R91.1 PULMONARY NODULE, RIGHT: ICD-10-CM

## 2019-12-30 DIAGNOSIS — I10 ESSENTIAL HYPERTENSION: Primary | ICD-10-CM

## 2019-12-30 DIAGNOSIS — B18.2 CHRONIC HEPATITIS C WITHOUT HEPATIC COMA (HCC): ICD-10-CM

## 2019-12-30 DIAGNOSIS — E66.01 SEVERE OBESITY WITH BODY MASS INDEX (BMI) OF 35.0 TO 39.9 WITH SERIOUS COMORBIDITY (HCC): ICD-10-CM

## 2019-12-30 DIAGNOSIS — R73.03 PREDIABETES: ICD-10-CM

## 2019-12-30 RX ORDER — ALBUTEROL SULFATE 90 UG/1
2 AEROSOL, METERED RESPIRATORY (INHALATION)
Qty: 1 INHALER | Refills: 11 | Status: SHIPPED | OUTPATIENT
Start: 2019-12-30

## 2019-12-30 RX ORDER — BUDESONIDE AND FORMOTEROL FUMARATE DIHYDRATE 160; 4.5 UG/1; UG/1
2 AEROSOL RESPIRATORY (INHALATION) 2 TIMES DAILY
Qty: 1 INHALER | Refills: 11 | Status: SHIPPED | OUTPATIENT
Start: 2019-12-30

## 2019-12-30 NOTE — PROGRESS NOTES
SPORTS MEDICINE AND PRIMARY CARE  Jay Barnett MD, [place] C[address] [address]  Phone:  [phone]  Fax: [phone]      Chief Complaint   Patient presents with   [place] Symptoms         SUBECTIVE:    Kasie Garcia is a 71 y.o. female Patient returns today with history of primary hypertension, obesity, prediabetes, hepatitis C, treated, and is seen for evaluation. Patient returns today now with a third round of respiratory infection. She had a bout in November when we saw her. Most recently she was seen at [facility] on 12/16/19 with upper respiratory symptoms, was given Augmentin to take, and for her pink eye given Polytrim. Patient comes in today complaining of a persistent cough that is nonproductive, no chills or fever, but congestion is in her chest, which seems to be the origin of her cough. Patient is seen for evaluation. Current Outpatient Medications   Medication Sig Dispense Refill    albuterol (PROVENTIL HFA, VENTOLIN HFA, PROAIR HFA) 90 mcg/actuation inhaler Take 2 Puffs by inhalation every four (4) hours as needed for Wheezing. 1 Inhaler 11    budesonide-formoterol (SYMBICORT) 160-4.5 mcg/actuation HFAA Take 2 Puffs by inhalation two (2) times a day. 1 Inhaler 11    sodium chloride (OCEAN) 0.65 % nasal squeeze bottle 0.1 mL by Both Nostrils route four (4) times daily. 45 mL 11    fluticasone propionate (FLONASE) 50 mcg/actuation nasal spray 2 Sprays by Both Nostrils route daily. 1 Bottle 11    hydroCHLOROthiazide (HYDRODIURIL) 12.5 mg tablet TAKE 1 TABLET BY MOUTH EVERY DAY 90 Tab 3    CYANOCOBALAMIN, VITAMIN B-12, (VITAMIN B-12 PO) Take 1 Tab by mouth daily.  multivitamin (ONE A DAY) tablet Take 1 Tab by mouth daily.  VITAMIN A PO Take 1 Tab by mouth daily.  PARoxetine (PAXIL) 10 mg tablet Take 1 Tab by mouth daily.  30 Tab 3     Past Medical History:   Diagnosis Date    RATLIFF (dyspnea on exertion) 06/03/2019    Encounter for Hemoccult screening 07/19/2017    neg    H/O cardiovascular stress test 06/17/2019    LVEF equals 59%. Left ventricular wall motion and thickening is normal    H/O gastric bypass 2003    md roshni    Hepatitis C     rx ended 9981,9364 viral load non detected, syl luna md    Hypertension     Laryngitis     Normal cardiac stress test 6-2-06/ 6/17/19    LVEF equals 59%. Left ventricular wall motion and thickening is normal    Obesity     Plantar fasciitis of right foot     Prediabetes     Pulmonary nodule, right 11-22-15  /  6-2-16    7mm  - repeat 6 mo  stable repeat 6/2/17  - [facility] rad   no change multiple pul nodules compare to [facility] 7/17/18    Renal cyst, right     S/P colonoscopy 2010    Wellness examination 01/09/2018     Past Surgical History:   Procedure Laterality Date    HX COLONOSCOPY      HX GYN       Allergies   Allergen Reactions    Codeine Hives       REVIEW OF SYSTEMS:   No hemoptysis. No chills or fever. Social History     Socioeconomic History    Marital status:      Spouse name: Not on file    Number of children: Not on file    Years of education: Not on file    Highest education level: Not on file   Tobacco Use    Smoking status: Never Smoker    Smokeless tobacco: Never Used   Substance and Sexual Activity    Alcohol use: Yes     Alcohol/week: 1.7 standard drinks     Types: 2 Glasses of wine per week     Comment: occasional    Drug use: No    Sexual activity: Yes     Partners: Male   Social History Narrative    Medical History: hepatitis C - how urbano luna mdUpper pole renal cystGastroesophageal reflux diseaseVenous stasis ulcersHistory of phlebitisJoe    joint disease kneeShe had endometriosisObesitynegative stress Cardiolite June 8, 2006 ejection fraction 62%[facility]    Gyn History: Last pap date 01/13/2014.     Surgical History: arthroscopic long limb gastric bypass w ith Tom-en-Y gastrojejunostomy md bryn 2003colonoscopy     Hospitalization/Major Diagnostic Procedure: Denies Past Hospitalization    Family History: Mother:  79 yrs Ovarian cancer w ith metastatic disease Father:  80, ca appendix  Sister(s): alive Brother(s): alive Daughter(s):    alive Son(s): alive 3 brother(s) wai saldana - mass mesentery, 1 sister(s) . 1 son(s) , 1 daughter(s) . Social History: Alcohol Use Patient does not use alcohol. Smoking Status Patient is a never smoker. Marital Status: W idow , W idow . Lives    w ith: alone. Occupation/W ork: employed full time teacher. Education/School: has highschool diploma, has college diploma [employer].   r  Family History   Problem Relation Age of Onset    Cancer Mother        OBJECTIVE:  Visit Vitals  /68   Pulse 67   Temp 98.3 °F (36.8 °C) (Oral)   Resp 18   Ht 5' 1\" (1.549 m)   Wt 195 lb 6.4 oz (88.6 kg)   SpO2 98%   BMI 36.92 kg/m²     ENT: perrla,  eom intact  NECK: supple. Thyroid normal  CHEST: clear to ascultation and percussion   HEART: regular rate and rhythm  ABD: soft, bowel sounds active  EXTREMITIES: no edema, pulse 1+     Office Visit on 10/28/2019   Component Date Value Ref Range Status    Specific Gravity 10/28/2019 1.029  1.005 - 1.030 Final    pH (UA) 10/28/2019 5.0  5.0 - 7.5 Final    Color 10/28/2019 Yellow  Yellow Final    Appearance 10/28/2019 Turbid* Clear Final    Leukocyte Esterase 10/28/2019 1+* Negative Final    Protein 10/28/2019 Negative  Negative/Trace Final    Glucose 10/28/2019 Negative  Negative Final    Ketone 10/28/2019 Negative  Negative Final    Blood 10/28/2019 Trace* Negative Final    Bilirubin 10/28/2019 Negative  Negative Final    Urobilinogen 10/28/2019 0.2  0.2 - 1.0 mg/dL Final    Nitrites 10/28/2019 Negative  Negative Final    Microscopic Examination 10/28/2019 See additional order   Final    Microscopic was indicated and was performed.     URINALYSIS REFLEX 10/28/2019 Comment Final    This specimen has reflexed to a Urine Culture.  WBC 10/28/2019 11-30* 0 - 5 /hpf Final    RBC 10/28/2019 3-10* 0 - 2 /hpf Final    Epithelial cells 10/28/2019 0-10  0 - 10 /hpf Final    Casts 10/28/2019 Present* None seen /lpf Final    Cast type 10/28/2019 Hyaline casts  N/A Final    Mucus 10/28/2019 Present  Not Estab. Final    Bacteria 10/28/2019 None seen  None seen/Few Final    Urine Culture, Routine 10/28/2019    Final                    Value:Mixed urogenital melissa  Less than 10,000 colonies/mL      Urine Culture, Routine 11/15/2019    Final                    Value:Mixed urogenital melissa  10,000-25,000 colony forming units per mL     Admission on 10/24/2019, Discharged on 10/24/2019   Component Date Value Ref Range Status    Sodium 10/24/2019 142  136 - 145 mmol/L Final    Potassium 10/24/2019 4.0  3.5 - 5.1 mmol/L Final    Chloride 10/24/2019 112* 97 - 108 mmol/L Final    CO2 10/24/2019 26  21 - 32 mmol/L Final    Anion gap 10/24/2019 4* 5 - 15 mmol/L Final    Glucose 10/24/2019 101* 65 - 100 mg/dL Final    BUN 10/24/2019 13  6 - 20 MG/DL Final    Creatinine 10/24/2019 0.89  0.55 - 1.02 MG/DL Final    BUN/Creatinine ratio 10/24/2019 15  12 - 20   Final    GFR est AA 10/24/2019 >60  >60 ml/min/1.73m2 Final    GFR est non-AA 10/24/2019 >60  >60 ml/min/1.73m2 Final    Comment: Estimated GFR is calculated using the IDMS-traceable Modification of Diet in Renal Disease (MDRD) Study equation, reported for both  Americans (GFRAA) and non- Americans (GFRNA), and normalized to 1.73m2 body surface area. The physician must decide which value applies to the patient. The MDRD study equation should only be used in individuals age 25 or older. It has not been validated for the following: pregnant women, patients with serious comorbid conditions, or on certain medications, or persons with extremes of body size, muscle mass, or nutritional status.       Calcium 10/24/2019 8.7 8.5 - 10.1 MG/DL Final    Bilirubin, total 10/24/2019 0.3  0.2 - 1.0 MG/DL Final    ALT (SGPT) 10/24/2019 14  12 - 78 U/L Final    AST (SGOT) 10/24/2019 18  15 - 37 U/L Final    Alk. phosphatase 10/24/2019 101  45 - 117 U/L Final    Protein, total 10/24/2019 7.1  6.4 - 8.2 g/dL Final    Albumin 10/24/2019 3.3* 3.5 - 5.0 g/dL Final    Globulin 10/24/2019 3.8  2.0 - 4.0 g/dL Final    A-G Ratio 10/24/2019 0.9* 1.1 - 2.2   Final    WBC 10/24/2019 6.6  3.6 - 11.0 K/uL Final    RBC 10/24/2019 4.89  3.80 - 5.20 M/uL Final    HGB 10/24/2019 12.4  11.5 - 16.0 g/dL Final    HCT 10/24/2019 39.9  35.0 - 47.0 % Final    MCV 10/24/2019 81.6  80.0 - 99.0 FL Final    MCH 10/24/2019 25.4* 26.0 - 34.0 PG Final    MCHC 10/24/2019 31.1  30.0 - 36.5 g/dL Final    RDW 10/24/2019 15.2* 11.5 - 14.5 % Final    PLATELET 89/02/6113 733  150 - 400 K/uL Final    MPV 10/24/2019 11.1  8.9 - 12.9 FL Final    NRBC 10/24/2019 0.0  0  WBC Final    ABSOLUTE NRBC 10/24/2019 0.00  0.00 - 0.01 K/uL Final    NEUTROPHILS 10/24/2019 59  32 - 75 % Final    LYMPHOCYTES 10/24/2019 31  12 - 49 % Final    MONOCYTES 10/24/2019 7  5 - 13 % Final    EOSINOPHILS 10/24/2019 2  0 - 7 % Final    BASOPHILS 10/24/2019 1  0 - 1 % Final    IMMATURE GRANULOCYTES 10/24/2019 0  0.0 - 0.5 % Final    ABS. NEUTROPHILS 10/24/2019 3.9  1.8 - 8.0 K/UL Final    ABS. LYMPHOCYTES 10/24/2019 2.0  0.8 - 3.5 K/UL Final    ABS. MONOCYTES 10/24/2019 0.4  0.0 - 1.0 K/UL Final    ABS. EOSINOPHILS 10/24/2019 0.2  0.0 - 0.4 K/UL Final    ABS. BASOPHILS 10/24/2019 0.0  0.0 - 0.1 K/UL Final    ABS. IMM. GRANS.  10/24/2019 0.0  0.00 - 0.04 K/UL Final    DF 10/24/2019 AUTOMATED    Final    Color 10/24/2019 YELLOW/STRAW    Final    Color Reference Range: Straw, Yellow or Dark Yellow    Appearance 10/24/2019 CLEAR  CLEAR   Final    Specific gravity 10/24/2019 1.019  1.003 - 1.030   Final    pH (UA) 10/24/2019 7.5  5.0 - 8.0   Final    Protein 10/24/2019 NEGATIVE   NEG mg/dL Final    Glucose 10/24/2019 NEGATIVE   NEG mg/dL Final    Ketone 10/24/2019 NEGATIVE   NEG mg/dL Final    Bilirubin 10/24/2019 NEGATIVE   NEG   Final    Blood 10/24/2019 NEGATIVE   NEG   Final    Urobilinogen 10/24/2019 1.0  0.2 - 1.0 EU/dL Final    Nitrites 10/24/2019 NEGATIVE   NEG   Final    Leukocyte Esterase 10/24/2019 SMALL* NEG   Final    WBC 10/24/2019 5-10  0 - 4 /hpf Final    RBC 10/24/2019 5-10  0 - 5 /hpf Final    Epithelial cells 10/24/2019 FEW  FEW /lpf Final    Epithelial cell category consists of squamous cells and /or transitional urothelial cells. Renal tubular cells, if present, are separately identified as such.  Bacteria 10/24/2019 1+* NEG /hpf Final    UA:UC IF INDICATED 10/24/2019 URINE CULTURE ORDERED* CNI   Final    Special Requests: 10/24/2019     Final                    Value:NO SPECIAL REQUESTS  Reflexed from [id number]      [place] Count 10/24/2019     Final                    Value:>100,000  COLONIES/mL      Culture result: 10/24/2019 MIXED UROGENITAL ZEESHAN ISOLATED    Final          ASSESSMENT:  1. Essential hypertension    2. Severe obesity with body mass index (BMI) of 35.0 to 39.9 with serious comorbidity ([facility]    3. Prediabetes    4. Chronic hepatitis C without hepatic coma ([facility] [facility]    5. Pulmonary nodule, right      BP control is at goal.    Obesity remains a concern, but she is concerned because she is losing weight, which she does not usually do. I think the sickness is causing her appetite to leave her and she has lost 4 lbs since we last saw her and we congratulate her for that. That will also help decrease the risk of prediabetes becoming diabetes. Her upper respiratory symptoms will be treated symptomatically. I think since she is coughing more at night this probably represents cough variant asthma and will give her Symbicort, as well as Albuterol, expecting relief. She will be back to see us in three to four months.   We gave her an appointment to see Fernando Son for the concerns that she has regarding plastic surgery. I have discussed the diagnosis with the patient and the intended plan as seen in the  orders above. The patient understands and agees with the plan. The patient has   received an after visit summary and questions were answered concerning  future plans  Patient labs and/or xrays were reviewed  Past records were reviewed. PLAN:  .  Orders Placed This Encounter    CT CHEST WO CONT    albuterol (PROVENTIL HFA, VENTOLIN HFA, PROAIR HFA) 90 mcg/actuation inhaler    budesonide-formoterol (SYMBICORT) 160-4.5 mcg/actuation HFAA       Follow-up and Dispositions    · Return in about 4 months (around 4/30/2020). ATTENTION:   This medical record was transcribed using an electronic medical records system. Although proofread, it may and can contain electronic and spelling errors. Other human spelling and other errors may be present. Corrections may be executed at a later time. Please feel free to contact us for any clarifications as needed.

## 2019-12-30 NOTE — PROGRESS NOTES
1. Have you been to the ER, urgent care clinic since your last visit? Hospitalized since your last visit? Yes When: 12-16-19 Reason for visit: cold symptoms    2. Have you seen or consulted any other health care providers outside of the [address] [place] [address] since your last visit? Include any pap smears or colon screening.  Yes [facility]      Cold symptoms

## 2020-01-04 RX ORDER — POLYMYXIN B SULFATE AND TRIMETHOPRIM 1; 10000 MG/ML; [USP'U]/ML
1 SOLUTION OPHTHALMIC EVERY 4 HOURS
Qty: 1 BOTTLE | Refills: 0 | Status: SHIPPED | OUTPATIENT
Start: 2020-01-04 | End: 2020-01-14

## 2020-01-04 RX ORDER — PREDNISONE 20 MG/1
40 TABLET ORAL
Qty: 10 TAB | Refills: 1 | Status: SHIPPED | OUTPATIENT
Start: 2020-01-04 | End: 2020-06-15 | Stop reason: ALTCHOICE

## 2020-01-10 PROBLEM — R91.8 PULMONARY NODULES: Status: ACTIVE | Noted: 2020-01-09

## 2020-03-02 ENCOUNTER — OFFICE VISIT (OUTPATIENT)
Dept: INTERNAL MEDICINE CLINIC | Age: 65
End: 2020-03-02

## 2020-03-02 VITALS
SYSTOLIC BLOOD PRESSURE: 138 MMHG | RESPIRATION RATE: 16 BRPM | OXYGEN SATURATION: 95 % | HEIGHT: 61 IN | TEMPERATURE: 98 F | WEIGHT: 193.9 LBS | DIASTOLIC BLOOD PRESSURE: 80 MMHG | BODY MASS INDEX: 36.61 KG/M2 | HEART RATE: 50 BPM

## 2020-03-02 DIAGNOSIS — I10 ESSENTIAL HYPERTENSION: ICD-10-CM

## 2020-03-02 DIAGNOSIS — E66.01 SEVERE OBESITY WITH BODY MASS INDEX (BMI) OF 35.0 TO 39.9 WITH SERIOUS COMORBIDITY (HCC): ICD-10-CM

## 2020-03-02 DIAGNOSIS — R73.03 PREDIABETES: ICD-10-CM

## 2020-03-02 DIAGNOSIS — B18.2 CHRONIC HEPATITIS C WITHOUT HEPATIC COMA (HCC): ICD-10-CM

## 2020-03-02 DIAGNOSIS — N28.1 RENAL CYST, RIGHT: Primary | ICD-10-CM

## 2020-03-02 DIAGNOSIS — Z98.84 H/O GASTRIC BYPASS: ICD-10-CM

## 2020-03-02 NOTE — PROGRESS NOTES
1. Have you been to the ER, urgent care clinic since your last visit? Hospitalized since your last visit? No    2. Have you seen or consulted any other health care providers outside of the [address] [place] [address] since your last visit? Include any pap smears or colon screening.  No

## 2020-03-02 NOTE — ASSESSMENT & PLAN NOTE
Stable, based on history, physical exam and review of pertinent labs, studies and medications; meds reconciled; continue current treatment plan.   Key Obesity Meds             hydroCHLOROthiazide (HYDRODIURIL) 12.5 mg tablet (Taking) TAKE 1 TABLET BY MOUTH EVERY DAY        Lab Results   Component Value Date/Time    Hemoglobin A1c 5.7 06/03/2019 05:49 PM    Glucose 101 10/24/2019 06:46 AM    Cholesterol, total 185 06/03/2019 05:49 PM    HDL Cholesterol 58 06/03/2019 05:49 PM    LDL, calculated 98 06/03/2019 05:49 PM    Triglyceride 144 06/03/2019 05:49 PM    TSH 2.500 06/03/2019 05:49 PM    Sodium 142 10/24/2019 06:46 AM    Potassium 4.0 10/24/2019 06:46 AM    ALT (SGPT) 14 10/24/2019 06:46 AM    AST (SGOT) 18 10/24/2019 06:46 AM

## 2020-03-02 NOTE — PROGRESS NOTES
SPORTS MEDICINE AND PRIMARY CARE  Adithya Cabello MD, [address] [address]  Phone:  [phone]  Fax: [phone]       Chief Complaint   Patient presents with    Annual Exam   .      SUBJECTIVE:    Cynthia Ren is a 71 y.o. female Patient returns today with known history of obesity, right renal cyst, prediabetes, history of gastric bypass, primary hypertension, treated hepatitis C, and is seen for evaluation. She is doing well, has no complaints. She would like to see a plastic surgeon. Current Outpatient Medications   Medication Sig Dispense Refill    hydroCHLOROthiazide (HYDRODIURIL) 12.5 mg tablet TAKE 1 TABLET BY MOUTH EVERY DAY 90 Tab 3    CYANOCOBALAMIN, VITAMIN B-12, (VITAMIN B-12 PO) Take 1 Tab by mouth daily.  multivitamin (ONE A DAY) tablet Take 1 Tab by mouth daily.  predniSONE (DELTASONE) 20 mg tablet Take 40 mg by mouth daily (with breakfast). 10 Tab 1    albuterol (PROVENTIL HFA, VENTOLIN HFA, PROAIR HFA) 90 mcg/actuation inhaler Take 2 Puffs by inhalation every four (4) hours as needed for Wheezing. 1 Inhaler 11    budesonide-formoterol (SYMBICORT) 160-4.5 mcg/actuation HFAA Take 2 Puffs by inhalation two (2) times a day. 1 Inhaler 11    sodium chloride (OCEAN) 0.65 % nasal squeeze bottle 0.1 mL by Both Nostrils route four (4) times daily. 45 mL 11    fluticasone propionate (FLONASE) 50 mcg/actuation nasal spray 2 Sprays by Both Nostrils route daily. 1 Bottle 11    PARoxetine (PAXIL) 10 mg tablet Take 1 Tab by mouth daily. 30 Tab 3    VITAMIN A PO Take 1 Tab by mouth daily. Past Medical History:   Diagnosis Date    RATLIFF (dyspnea on exertion) 06/03/2019    Encounter for Hemoccult screening 07/19/2017    neg    H/O cardiovascular stress test 06/17/2019    LVEF equals 59%.  Left ventricular wall motion and thickening is normal    H/O gastric bypass 2003    md roshni    Hepatitis C     rx ended 3362,2540 viral load non detected, syl luna md    Hypertension     Laryngitis     Normal cardiac stress test 6-2-06/ 6/17/19    LVEF equals 59%. Left ventricular wall motion and thickening is normal    Obesity     Plantar fasciitis of right foot     Prediabetes     Pulmonary nodule, right 11-22-15  /  6-2-16    7mm  - repeat 6 mo  stable repeat 6/2/17  - [facility] rad   no change multiple pul nodules compare to [facility] 7/17/18    Pulmonary nodules 01/09/2020    stable 0no new nodules - high risk pt f/u in 12 months    Renal cyst, right     S/P colonoscopy 2010    Wellness examination 01/09/2018     Past Surgical History:   Procedure Laterality Date    HX COLONOSCOPY      HX GYN       Allergies   Allergen Reactions    Codeine Hives         REVIEW OF SYSTEMS:  General: negative for - chills or fever  ENT: negative for - headaches, nasal congestion or tinnitus  Respiratory: negative for - cough, hemoptysis, shortness of breath or wheezing  Cardiovascular : negative for - chest pain, edema, palpitations or shortness of breath  Gastrointestinal: negative for - abdominal pain, blood in stools, heartburn or nausea/vomiting  Genito-Urinary: no dysuria, trouble voiding, or hematuria  Musculoskeletal: negative for - gait disturbance, joint pain, joint stiffness or joint swelling  Neurological: no TIA or stroke symptoms  Hematologic: no bruises, no bleeding, no swollen glands  Integument: no lumps, mole changes, nail changes or rash  Endocrine: no malaise/lethargy or unexpected weight changes      Social History     Socioeconomic History    Marital status:      Spouse name: Not on file    Number of children: Not on file    Years of education: Not on file    Highest education level: Not on file   Tobacco Use    Smoking status: Never Smoker    Smokeless tobacco: Never Used   Substance and Sexual Activity    Alcohol use:  Yes     Alcohol/week: 1.7 standard drinks     Types: 2 Glasses of wine per week     Comment: occasional    Drug use: No    Sexual activity: Yes     Partners: Male   Social History Narrative    Medical History: hepatitis C - how urbano luna mdUpper pole renal cystGastroesophageal reflux diseaseVenous stasis ulcersHistory of phlebitisJoe    joint disease kneeShe had endometriosisObesitynegative stress Cardiolite 2006 ejection fraction 62%Family history ovarian    cancerSummer  ophthalmological evaluation    Gyn History: Last pap date 2014. Surgical History: arthroscopic long limb gastric bypass w ith Tom-en-Y gastrojejunostomy md bryn 2003colonoscopy     Hospitalization/Major Diagnostic Procedure: Denies Past Hospitalization    Family History: Mother:  79 yrs Ovarian cancer w ith metastatic disease Father:  80, ca appendix  Sister(s): alive Brother(s): alive Daughter(s):    alive Son(s): alive 3 brother(s) wai saldana - mass mesentery, 1 sister(s) . 1 son(s) , 1 daughter(s) . Social History: Alcohol Use Patient does not use alcohol. Smoking Status Patient is a never smoker. Marital Status: W idow , W idow . Lives    w ith: alone. Occupation/W ork: employed full time teacher. Education/School: has highschool diploma, has college diploma [employer]. Family History   Problem Relation Age of Onset    Cancer Mother        OBJECTIVE:    Visit Vitals  /80   Pulse (!) 50   Temp 98 °F (36.7 °C) (Oral)   Resp 16   Ht 5' 1\" (1.549 m)   Wt 193 lb 14.4 oz (88 kg)   SpO2 95%   BMI 36.64 kg/m²     CONSTITUTIONAL: well , well nourished, appears age appropriate  EYES: perrla, eom intact  ENMT:moist mucous membranes, pharynx clear  NECK: supple. Thyroid normal  RESPIRATORY: Chest: clear bilaterally   CARDIOVASCULAR: Heart: regular rate and rhythm  GASTROINTESTINAL: Abdomen: soft, bowel sounds active  HEMATOLOGIC: no pathological lymph nodes palpated  MUSCULOSKELETAL: Extremities: no edema, pulse 1+   INTEGUMENT: No unusual rashes or suspicious skin lesions noted.  Nails appear normal.  NEUROLOGIC: non-focal exam   MENTAL STATUS: alert and oriented, appropriate affect           ASSESSMENT:  1. Renal cyst, right    2. Severe obesity with body mass index (BMI) of 35.0 to 39.9 with serious comorbidity ([address]    3. Prediabetes    4. H/O gastric bypass    5. Essential hypertension    6. Chronic hepatitis C without hepatic coma (HCC)      Patient's renal status is stable. We have been concerned about her weight and since we last saw her she gave up sweets and bread and lost 2 pounds within two months, which is very good, but since the first of December she has lost 6 pounds, so we encourage her to keep up what she is doing and this is an excellent way to lose weight. We also would like her to keep her physical activity up for 30 minutes five days a week. She wonders about the diagnosis of prediabetes, which was made in May of 2017. The last hemoglobin A1c's have been borderline at 5.7. Will check that again in June for her annual laboratory evaluation. Blood pressure control is adequate. Treated hepatitis C. She will be back to see us in four months, sooner if she has any problems. I have discussed the diagnosis with the patient and the intended plan as seen in the  orders above. The patient understands and agees with the plan. The patient has   received an after visit summary and questions were answered concerning  future plans  Patient labs and/or xrays were reviewed  Past records were reviewed. PLAN:  . No orders of the defined types were placed in this encounter. Follow-up and Dispositions    · Return in about 4 months (around 7/2/2020). ATTENTION:   This medical record was transcribed using an electronic medical records system. Although proofread, it may and can contain electronic and spelling errors. Other human spelling and other errors may be present. Corrections may be executed at a later time.   Please feel free to contact us for any clarifications as needed.

## 2020-06-15 ENCOUNTER — OFFICE VISIT (OUTPATIENT)
Dept: INTERNAL MEDICINE CLINIC | Age: 65
End: 2020-06-15

## 2020-06-15 VITALS
TEMPERATURE: 97.8 F | DIASTOLIC BLOOD PRESSURE: 75 MMHG | WEIGHT: 194.7 LBS | HEIGHT: 61 IN | OXYGEN SATURATION: 97 % | RESPIRATION RATE: 16 BRPM | BODY MASS INDEX: 36.76 KG/M2 | SYSTOLIC BLOOD PRESSURE: 117 MMHG | HEART RATE: 51 BPM

## 2020-06-15 DIAGNOSIS — E66.01 SEVERE OBESITY WITH BODY MASS INDEX (BMI) OF 35.0 TO 39.9 WITH SERIOUS COMORBIDITY (HCC): ICD-10-CM

## 2020-06-15 DIAGNOSIS — I10 ESSENTIAL HYPERTENSION: Primary | ICD-10-CM

## 2020-06-15 DIAGNOSIS — R73.03 PREDIABETES: ICD-10-CM

## 2020-06-15 DIAGNOSIS — Z98.84 H/O GASTRIC BYPASS: ICD-10-CM

## 2020-06-15 RX ORDER — HYDROCHLOROTHIAZIDE 12.5 MG/1
TABLET ORAL
Qty: 90 TAB | Refills: 3 | Status: SHIPPED | OUTPATIENT
Start: 2020-06-15 | End: 2021-04-28

## 2020-06-15 NOTE — PROGRESS NOTES
SPORTS MEDICINE AND PRIMARY CARE  Yves Molina MD, [address] [address]  Phone:  [phone]  Fax: [phone]       Chief Complaint   Patient presents with    Hypertension   . SUBJECTIVE:    Jewel Hanley is a 71 y.o. female Patient returns today with a history of primary hypertension, gastric bypass, prediabetes, obesity and pulmonary nodules. She has no new complaints. She is going to retire, she states, on November 1st.  Patient is seen for evaluation. Current Outpatient Medications   Medication Sig Dispense Refill    hydroCHLOROthiazide (HYDRODIURIL) 12.5 mg tablet TAKE 1 TABLET BY MOUTH EVERY DAY 90 Tab 3    predniSONE (DELTASONE) 20 mg tablet Take 40 mg by mouth daily (with breakfast). 10 Tab 1    albuterol (PROVENTIL HFA, VENTOLIN HFA, PROAIR HFA) 90 mcg/actuation inhaler Take 2 Puffs by inhalation every four (4) hours as needed for Wheezing. 1 Inhaler 11    budesonide-formoterol (SYMBICORT) 160-4.5 mcg/actuation HFAA Take 2 Puffs by inhalation two (2) times a day. 1 Inhaler 11    sodium chloride (OCEAN) 0.65 % nasal squeeze bottle 0.1 mL by Both Nostrils route four (4) times daily. 45 mL 11    fluticasone propionate (FLONASE) 50 mcg/actuation nasal spray 2 Sprays by Both Nostrils route daily. 1 Bottle 11    PARoxetine (PAXIL) 10 mg tablet Take 1 Tab by mouth daily. 30 Tab 3    CYANOCOBALAMIN, VITAMIN B-12, (VITAMIN B-12 PO) Take 1 Tab by mouth daily.  multivitamin (ONE A DAY) tablet Take 1 Tab by mouth daily.  VITAMIN A PO Take 1 Tab by mouth daily. Past Medical History:   Diagnosis Date    RATLIFF (dyspnea on exertion) 06/03/2019    Encounter for Hemoccult screening 07/19/2017    neg    H/O cardiovascular stress test 06/17/2019    LVEF equals 59%.  Left ventricular wall motion and thickening is normal    H/O gastric bypass 2003    md roshni    Hepatitis C     rx ended 6288,0584 viral load non detectedsyl md cheryl    Hypertension     Laryngitis     Normal cardiac stress test 6-2-06/ 6/17/19    LVEF equals 59%. Left ventricular wall motion and thickening is normal    Obesity     Plantar fasciitis of right foot     Prediabetes     Pulmonary nodule, right 11-22-15  /  6-2-16    7mm  - repeat 6 mo  stable repeat 6/2/17  - [facility] rad   no change multiple pul nodules compare to [facility] 7/17/18    Pulmonary nodules 01/09/2020    stable 0no new nodules - high risk pt f/u in 12 months    Renal cyst, right     S/P colonoscopy 2010    Wellness examination 01/09/2018     Past Surgical History:   Procedure Laterality Date    HX COLONOSCOPY      HX GYN       Allergies   Allergen Reactions    Codeine Hives         REVIEW OF SYSTEMS:  General: negative for - chills or fever  ENT: negative for - headaches, nasal congestion or tinnitus  Respiratory: negative for - cough, hemoptysis, shortness of breath or wheezing  Cardiovascular : negative for - chest pain, edema, palpitations or shortness of breath  Gastrointestinal: negative for - abdominal pain, blood in stools, heartburn or nausea/vomiting  Genito-Urinary: no dysuria, trouble voiding, or hematuria  Musculoskeletal: negative for - gait disturbance, joint pain, joint stiffness or joint swelling  Neurological: no TIA or stroke symptoms  Hematologic: no bruises, no bleeding, no swollen glands  Integument: no lumps, mole changes, nail changes or rash  Endocrine: no malaise/lethargy or unexpected weight changes      Social History     Socioeconomic History    Marital status:      Spouse name: Not on file    Number of children: Not on file    Years of education: Not on file    Highest education level: Not on file   Tobacco Use    Smoking status: Never Smoker    Smokeless tobacco: Never Used   Substance and Sexual Activity    Alcohol use:  Yes     Alcohol/week: 1.7 standard drinks     Types: 2 Glasses of wine per week     Comment: occasional    Drug use: No    Sexual activity: Yes     Partners: Male   Social History Narrative    Medical History: hepatitis C - how urbano luna mdUpper pole renal cystGastroesophageal reflux diseaseVenous stasis ulcersHistory of phlebitisJoe    joint disease kneeShe had endometriosisObesitynegative stress Cardiolite 2006 ejection fraction 62%Family history ovarian    cancerSummer  ophthalmological evaluation    Gyn History: Last pap date 2014. Surgical History: arthroscopic long limb gastric bypass w ith Tom-en-Y gastrojejunostomy md bryn 2003colonoscopy     Hospitalization/Major Diagnostic Procedure: Denies Past Hospitalization    Family History: Mother:  79 yrs Ovarian cancer w ith metastatic disease Father:  80, ca appendix  Sister(s): alive Brother(s): alive Daughter(s):    alive Son(s): alive 3 brother(s) wai saldana - mass mesentery, 1 sister(s) . 1 son(s) , 1 daughter(s) . Social History: Alcohol Use Patient does not use alcohol. Smoking Status Patient is a never smoker. Marital Status: W idow , W idow . Lives    w ith: alone. Occupation/W ork: employed full time teacher. Education/School: has highschool diploma, has college diploma [employer]. Family History   Problem Relation Age of Onset    Cancer Mother        OBJECTIVE:    Visit Vitals  /75   Pulse (!) 51   Temp 97.8 °F (36.6 °C) (Oral)   Resp 16   Ht 5' 1\" (1.549 m)   Wt 194 lb 11.2 oz (88.3 kg)   SpO2 97%   BMI 36.79 kg/m²     CONSTITUTIONAL: well , well nourished, appears age appropriate  EYES: perrla, eom intact  ENMT:moist mucous membranes, pharynx clear  NECK: supple. Thyroid normal  RESPIRATORY: Chest: clear bilaterally   CARDIOVASCULAR: Heart: regular rate and rhythm  GASTROINTESTINAL: Abdomen: soft, bowel sounds active  HEMATOLOGIC: no pathological lymph nodes palpated  MUSCULOSKELETAL: Extremities: no edema, pulse 1+   INTEGUMENT: No unusual rashes or suspicious skin lesions noted.  Nails appear normal.  NEUROLOGIC: non-focal exam   MENTAL STATUS: alert and oriented, appropriate affect           ASSESSMENT:  1. Essential hypertension    2. H/O gastric bypass    3. Prediabetes    4. Severe obesity with body mass index (BMI) of 35.0 to 39.9 with serious comorbidity ([facility] [address]      Blood pressure control is adequate, but when she takes Hydrochlorothiazide she is wiped out the next day. She is now taking a half and she takes it with orange juice or eats a banana. Weight has changed little, in fact she has gained a little bit since coronavirus has shut her down. She has been, however, active with the [employer], but has been keeping her distance, she states. Prediabetes remains a concern and we continue to follow the A1c's. Obesity remains a concern. We encourage a heart healthy, weight reducing diet with physical activity for 30 minutes five days a week. Return to the office in six months. Mammograms are requested. I have discussed the diagnosis with the patient and the intended plan as seen in the  orders above. The patient understands and agees with the plan. The patient has   received an after visit summary and questions were answered concerning  future plans  Patient labs and/or xrays were reviewed  Past records were reviewed. PLAN:  . No orders of the defined types were placed in this encounter. ATTENTION:   This medical record was transcribed using an electronic medical records system. Although proofread, it may and can contain electronic and spelling errors. Other human spelling and other errors may be present. Corrections may be executed at a later time. Please feel free to contact us for any clarifications as needed.

## 2020-06-15 NOTE — PROGRESS NOTES
1. Have you been to the ER, urgent care clinic since your last visit? Hospitalized since your last visit? No    2. Have you seen or consulted any other health care providers outside of the [address] [place] [address] since your last visit? Include any pap smears or colon screening.  No

## 2020-06-22 ENCOUNTER — HOSPITAL ENCOUNTER (OUTPATIENT)
Dept: MAMMOGRAPHY | Age: 65
Discharge: HOME | End: 2020-06-22
Attending: INTERNAL MEDICINE
Payer: COMMERCIAL

## 2020-06-22 DIAGNOSIS — Z12.31 VISIT FOR SCREENING MAMMOGRAM: ICD-10-CM

## 2020-06-22 PROCEDURE — 77063 BREAST TOMOSYNTHESIS BI: CPT

## 2020-10-23 ENCOUNTER — OFFICE VISIT (OUTPATIENT)
Dept: INTERNAL MEDICINE CLINIC | Age: 65
End: 2020-10-23

## 2020-10-23 VITALS
OXYGEN SATURATION: 95 % | HEART RATE: 55 BPM | DIASTOLIC BLOOD PRESSURE: 85 MMHG | HEIGHT: 61 IN | SYSTOLIC BLOOD PRESSURE: 150 MMHG | BODY MASS INDEX: 38.01 KG/M2 | RESPIRATION RATE: 16 BRPM | WEIGHT: 201.3 LBS | TEMPERATURE: 97.8 F

## 2020-10-23 DIAGNOSIS — Z12.11 SCREEN FOR COLON CANCER: ICD-10-CM

## 2020-10-23 DIAGNOSIS — R91.1 PULMONARY NODULE, RIGHT: ICD-10-CM

## 2020-10-23 DIAGNOSIS — M25.511 CHRONIC RIGHT SHOULDER PAIN: ICD-10-CM

## 2020-10-23 DIAGNOSIS — R91.8 PULMONARY NODULES: ICD-10-CM

## 2020-10-23 DIAGNOSIS — G89.29 CHRONIC RIGHT SHOULDER PAIN: ICD-10-CM

## 2020-10-23 DIAGNOSIS — R73.03 PREDIABETES: ICD-10-CM

## 2020-10-23 DIAGNOSIS — I10 ESSENTIAL HYPERTENSION: Primary | ICD-10-CM

## 2020-10-23 DIAGNOSIS — E66.01 SEVERE OBESITY WITH BODY MASS INDEX (BMI) OF 35.0 TO 39.9 WITH SERIOUS COMORBIDITY (HCC): ICD-10-CM

## 2020-10-23 NOTE — PROGRESS NOTES
SPORTS MEDICINE AND PRIMARY CARE  Jenny Bass MD, [address] [address]  Phone:  [phone]  Fax: [phone]       Chief Complaint   Patient presents with    Hypertension   . SUBJECTIVE:    Ita Quezada is a 71 y.o. female Patient complains of right shoulder discomfort. She takes Advil with relief. It only bothers her during the night, it does not bother her during the day. If she lays on it, it seems to be relieved. She has a known history of primary hypertension, right pulmonary nodule, prediabetes, obesity, and is seen for evaluation. Current Outpatient Medications   Medication Sig Dispense Refill    hydroCHLOROthiazide (HYDRODIURIL) 12.5 mg tablet TAKE 1 TABLET BY MOUTH EVERY DAY 90 Tab 3    albuterol (PROVENTIL HFA, VENTOLIN HFA, PROAIR HFA) 90 mcg/actuation inhaler Take 2 Puffs by inhalation every four (4) hours as needed for Wheezing. 1 Inhaler 11    budesonide-formoterol (SYMBICORT) 160-4.5 mcg/actuation HFAA Take 2 Puffs by inhalation two (2) times a day. 1 Inhaler 11    sodium chloride (OCEAN) 0.65 % nasal squeeze bottle 0.1 mL by Both Nostrils route four (4) times daily. 45 mL 11    fluticasone propionate (FLONASE) 50 mcg/actuation nasal spray 2 Sprays by Both Nostrils route daily. 1 Bottle 11    PARoxetine (PAXIL) 10 mg tablet Take 1 Tab by mouth daily. 30 Tab 3    CYANOCOBALAMIN, VITAMIN B-12, (VITAMIN B-12 PO) Take 1 Tab by mouth daily.  multivitamin (ONE A DAY) tablet Take 1 Tab by mouth daily.  VITAMIN A PO Take 1 Tab by mouth daily. Past Medical History:   Diagnosis Date    RATLIFF (dyspnea on exertion) 06/03/2019    Encounter for Hemoccult screening 07/19/2017    neg    H/O cardiovascular stress test 06/17/2019    LVEF equals 59%.  Left ventricular wall motion and thickening is normal    H/O gastric bypass 2003    md roshni    Hepatitis C     rx ended [id number] viral load non detected, syl luna md  Hypertension     Laryngitis     Normal cardiac stress test 6-2-06/ 6/17/19    LVEF equals 59%. Left ventricular wall motion and thickening is normal    Obesity     Plantar fasciitis of right foot     Prediabetes     Pulmonary nodule, right 11-22-15  /  6-2-16    7mm  - repeat 6 mo  stable repeat 6/2/17  - [facility] rad   no change multiple pul nodules compare to [facility] 7/17/18    Pulmonary nodules 01/09/2020    stable 0no new nodules - high risk pt f/u in 12 months    Renal cyst, right     S/P colonoscopy 2010    Shoulder pain, right     Wellness examination 01/09/2018     Past Surgical History:   Procedure Laterality Date    HX COLONOSCOPY      HX GYN       Allergies   Allergen Reactions    Codeine Hives         REVIEW OF SYSTEMS:  General: negative for - chills or fever  ENT: negative for - headaches, nasal congestion or tinnitus  Respiratory: negative for - cough, hemoptysis, shortness of breath or wheezing  Cardiovascular : negative for - chest pain, edema, palpitations or shortness of breath  Gastrointestinal: negative for - abdominal pain, blood in stools, heartburn or nausea/vomiting  Genito-Urinary: no dysuria, trouble voiding, or hematuria  Musculoskeletal: negative for - gait disturbance, joint pain, joint stiffness or joint swelling  Neurological: no TIA or stroke symptoms  Hematologic: no bruises, no bleeding, no swollen glands  Integument: no lumps, mole changes, nail changes or rash  Endocrine: no malaise/lethargy or unexpected weight changes      Social History     Socioeconomic History    Marital status:      Spouse name: Not on file    Number of children: Not on file    Years of education: Not on file    Highest education level: Not on file   Tobacco Use    Smoking status: Never Smoker    Smokeless tobacco: Never Used   Substance and Sexual Activity    Alcohol use:  Yes     Alcohol/week: 1.7 standard drinks     Types: 2 Glasses of wine per week     Comment: occasional    Drug use: No    Sexual activity: Yes     Partners: Male   Social History Narrative    Medical History: hepatitis C - how urbano luna mdUpper pole renal cystGastroesophageal reflux diseaseVenous stasis ulcersHistory of phlebitisJoe    joint disease kneeShe had endometriosisObesitynegative stress Cardiolite 2006 ejection fraction 62%Family history ovarian    cancerSummer  ophthalmological evaluation    Gyn History: Last pap date 2014. Surgical History: arthroscopic long limb gastric bypass w ith Tom-en-Y gastrojejunostomy md bryn 2003colonoscopy     Hospitalization/Major Diagnostic Procedure: Denies Past Hospitalization        Family History: Mother:  79 yrs Ovarian cancer w ith metastatic disease Father:  80, ca appendix  Sister(s): alive Brother(s): alive Daughter(s):    alive Son(s): alive 3 brother(s) wai saldana - mass mesentery, 1 sister(s) . 1 son(s) , 1 daughter(s) . Social History: Alcohol Use Patient does not use alcohol. Smoking Status Patient is a never smoker. Marital Status: W idow , W idow . Lives    w ith: alone. 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  Thyroid normal  RESPIRATORY: Chest: clear bilaterally   CARDIOVASCULAR: Heart: regular rate and rhythm  GASTROINTESTINAL: Abdomen: soft, bowel sounds active  HEMATOLOGIC: no pathological lymph nodes palpated  MUSCULOSKELETAL: Extremities: no edema, pulse 1+   INTEGUMENT: No unusual rashes or suspicious skin lesions noted. Nails appear normal.  NEUROLOGIC: non-focal exam   MENTAL STATUS: alert and oriented, appropriate affect           ASSESSMENT:  1. Essential hypertension    2. Pulmonary nodule, right    3. Prediabetes    4. Severe obesity with body mass index (BMI) of 35.0 to 39.9 with serious comorbidity ([address]    5. Pulmonary nodules    6. Chronic right shoulder pain    7. Screen for colon cancer      Shoulder exam is remarkable for some discomfort at the extreme of abduction. We will x-ray the shoulder and report to her the results. Blood pressure control is good. When we repeat the blood pressure at the wrist, it is 132/86, which is acceptable. Known history of prediabetes. We will check hemoglobin A1c for control today. We have been concerned about her weight and her concern is much less than ours because she decided to join the [employer], and I think she is going to use the excuse of COVID on why she joined the [employer]. Certainly now that she has retired there is no reason she cannot be physically active 30 minutes five days a week and a heart healthy, weight reducing diet. I am not sure of the reason for the shoulder discomfort. We will take an x-ray. She will be back to see us in four months, sooner if she has any problems. I have discussed the diagnosis with the patient and the intended plan as seen in the  Orders. The patient understands and agees with the plan. The patient has   received an after visit summary and questions were answered concerning  future plans  Patient labs and/or xrays were reviewed  Past records were reviewed.     PLAN:  .  Orders Placed This Encounter    XR SHOULDER RT AP/LAT MIN 2 V    URINALYSIS W/ RFLX MICROSCOPIC    CBC WITH AUTOMATED DIFF    METABOLIC PANEL, COMPREHENSIVE    LIPID PANEL    TSH 3RD GENERATION    HEMOGLOBIN A1C WITH EAG    REFERRAL TO GASTROENTEROLOGY       Follow-up and Dispositions    · Return in about 4 months (around 2/23/2021). ATTENTION:   This medical record was transcribed using an electronic medical records system. Although proofread, it may and can contain electronic and spelling errors. Other human spelling and other errors may be present. Corrections may be executed at a later time. Please feel free to contact us for any clarifications as needed.

## 2020-10-23 NOTE — PROGRESS NOTES
1. Have you been to the ER, urgent care clinic since your last visit? Hospitalized since your last visit? No    2. Have you seen or consulted any other health care providers outside of the [address] [place] [address] since your last visit? Include any pap smears or colon screening.  No     Right shoulder issue

## 2020-10-24 LAB
ALBUMIN SERPL-MCNC: 4.4 G/DL (ref 3.8–4.8)
ALBUMIN/GLOB SERPL: 1.6 {RATIO} (ref 1.2–2.2)
ALP SERPL-CCNC: 109 IU/L (ref 39–117)
ALT SERPL-CCNC: 13 IU/L (ref 0–32)
APPEARANCE UR: CLEAR
AST SERPL-CCNC: 20 IU/L (ref 0–40)
BACTERIA #/AREA URNS HPF: NORMAL /[HPF]
BASOPHILS # BLD AUTO: 0.1 X10E3/UL (ref 0–0.2)
BASOPHILS NFR BLD AUTO: 1 %
BILIRUB SERPL-MCNC: <0.2 MG/DL (ref 0–1.2)
BILIRUB UR QL STRIP: NEGATIVE
BUN SERPL-MCNC: 16 MG/DL (ref 8–27)
BUN/CREAT SERPL: 18 (ref 12–28)
CALCIUM SERPL-MCNC: 9.3 MG/DL (ref 8.7–10.3)
CASTS URNS QL MICRO: NORMAL /LPF
CHLORIDE SERPL-SCNC: 104 MMOL/L (ref 96–106)
CHOLEST SERPL-MCNC: 180 MG/DL (ref 100–199)
CO2 SERPL-SCNC: 24 MMOL/L (ref 20–29)
COLOR UR: YELLOW
CREAT SERPL-MCNC: 0.9 MG/DL (ref 0.57–1)
EOSINOPHIL # BLD AUTO: 0.1 X10E3/UL (ref 0–0.4)
EOSINOPHIL NFR BLD AUTO: 1 %
EPI CELLS #/AREA URNS HPF: NORMAL /HPF (ref 0–10)
ERYTHROCYTE [DISTWIDTH] IN BLOOD BY AUTOMATED COUNT: 13.7 % (ref 11.7–15.4)
EST. AVERAGE GLUCOSE BLD GHB EST-MCNC: 114 MG/DL
GLOBULIN SER CALC-MCNC: 2.7 G/DL (ref 1.5–4.5)
GLUCOSE SERPL-MCNC: 94 MG/DL (ref 65–99)
GLUCOSE UR QL: NEGATIVE
HBA1C MFR BLD: 5.6 % (ref 4.8–5.6)
HCT VFR BLD AUTO: 39.8 % (ref 34–46.6)
HDLC SERPL-MCNC: 64 MG/DL
HGB BLD-MCNC: 13 G/DL (ref 11.1–15.9)
HGB UR QL STRIP: NEGATIVE
IMM GRANULOCYTES # BLD AUTO: 0 X10E3/UL (ref 0–0.1)
IMM GRANULOCYTES NFR BLD AUTO: 0 %
KETONES UR QL STRIP: NEGATIVE
LDLC SERPL CALC-MCNC: 94 MG/DL (ref 0–99)
LEUKOCYTE ESTERASE UR QL STRIP: ABNORMAL
LYMPHOCYTES # BLD AUTO: 2.7 X10E3/UL (ref 0.7–3.1)
LYMPHOCYTES NFR BLD AUTO: 35 %
MCH RBC QN AUTO: 26.1 PG (ref 26.6–33)
MCHC RBC AUTO-ENTMCNC: 32.7 G/DL (ref 31.5–35.7)
MCV RBC AUTO: 80 FL (ref 79–97)
MICRO URNS: ABNORMAL
MONOCYTES # BLD AUTO: 0.4 X10E3/UL (ref 0.1–0.9)
MONOCYTES NFR BLD AUTO: 6 %
MUCOUS THREADS URNS QL MICRO: PRESENT
NEUTROPHILS # BLD AUTO: 4.4 X10E3/UL (ref 1.4–7)
NEUTROPHILS NFR BLD AUTO: 57 %
NITRITE UR QL STRIP: NEGATIVE
PH UR STRIP: 6 [PH] (ref 5–7.5)
PLATELET # BLD AUTO: 241 X10E3/UL (ref 150–450)
POTASSIUM SERPL-SCNC: 4.5 MMOL/L (ref 3.5–5.2)
PROT SERPL-MCNC: 7.1 G/DL (ref 6–8.5)
PROT UR QL STRIP: NEGATIVE
RBC # BLD AUTO: 4.99 X10E6/UL (ref 3.77–5.28)
RBC #/AREA URNS HPF: NORMAL /HPF (ref 0–2)
SODIUM SERPL-SCNC: 141 MMOL/L (ref 134–144)
SP GR UR: 1.02 (ref 1–1.03)
TRIGL SERPL-MCNC: 127 MG/DL (ref 0–149)
TSH SERPL DL<=0.005 MIU/L-ACNC: 1.86 UIU/ML (ref 0.45–4.5)
UROBILINOGEN UR STRIP-MCNC: 1 MG/DL (ref 0.2–1)
VLDLC SERPL CALC-MCNC: 22 MG/DL (ref 5–40)
WBC # BLD AUTO: 7.7 X10E3/UL (ref 3.4–10.8)
WBC #/AREA URNS HPF: NORMAL /HPF (ref 0–5)

## 2021-01-10 LAB — SARS-COV-2, NAA: NOT DETECTED

## 2021-01-13 ENCOUNTER — OFFICE VISIT (OUTPATIENT)
Dept: INTERNAL MEDICINE CLINIC | Age: 66
End: 2021-01-13
Payer: MEDICARE

## 2021-01-13 VITALS
HEART RATE: 78 BPM | WEIGHT: 200.9 LBS | SYSTOLIC BLOOD PRESSURE: 144 MMHG | RESPIRATION RATE: 16 BRPM | BODY MASS INDEX: 37.93 KG/M2 | OXYGEN SATURATION: 98 % | HEIGHT: 61 IN | TEMPERATURE: 97.7 F | DIASTOLIC BLOOD PRESSURE: 86 MMHG

## 2021-01-13 DIAGNOSIS — Z80.41 FAMILY HISTORY OF OVARIAN CARCINOMA: ICD-10-CM

## 2021-01-13 DIAGNOSIS — I10 ESSENTIAL HYPERTENSION: ICD-10-CM

## 2021-01-13 DIAGNOSIS — B18.2 CHRONIC HEPATITIS C WITHOUT HEPATIC COMA (HCC): ICD-10-CM

## 2021-01-13 DIAGNOSIS — R91.8 PULMONARY NODULES: ICD-10-CM

## 2021-01-13 DIAGNOSIS — E66.01 SEVERE OBESITY WITH BODY MASS INDEX (BMI) OF 35.0 TO 39.9 WITH SERIOUS COMORBIDITY (HCC): ICD-10-CM

## 2021-01-13 DIAGNOSIS — Z98.84 H/O GASTRIC BYPASS: ICD-10-CM

## 2021-01-13 DIAGNOSIS — R73.03 PREDIABETES: ICD-10-CM
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
## 2021-01-13 PROCEDURE — 1090F PRES/ABSN URINE INCON ASSESS: CPT | Performed by: INTERNAL MEDICINE

## 2021-01-13 PROCEDURE — G8536 NO DOC ELDER MAL SCRN: HCPCS | Performed by: INTERNAL MEDICINE

## 2021-01-13 PROCEDURE — G8400 PT W/DXA NO RESULTS DOC: HCPCS | Performed by: INTERNAL MEDICINE

## 2021-01-13 PROCEDURE — G8417 CALC BMI ABV UP PARAM F/U: HCPCS | Performed by: INTERNAL MEDICINE

## 2021-01-13 PROCEDURE — G8510 SCR DEP NEG, NO PLAN REQD: HCPCS | Performed by: INTERNAL MEDICINE

## 2021-01-13 PROCEDURE — G8754 DIAS BP LESS 90: HCPCS | Performed by: INTERNAL MEDICINE

## 2021-01-13 PROCEDURE — G9899 SCRN MAM PERF RSLTS DOC: HCPCS | Performed by: INTERNAL MEDICINE

## 2021-01-13 PROCEDURE — 1101F PT FALLS ASSESS-DOCD LE1/YR: CPT | Performed by: INTERNAL MEDICINE

## 2021-01-13 PROCEDURE — 99215 OFFICE O/P EST HI 40 MIN: CPT | Performed by: INTERNAL MEDICINE

## 2021-01-13 PROCEDURE — G8753 SYS BP > OR = 140: HCPCS | Performed by: INTERNAL MEDICINE

## 2021-01-13 PROCEDURE — 3017F COLORECTAL CA SCREEN DOC REV: CPT | Performed by: INTERNAL MEDICINE

## 2021-01-13 PROCEDURE — G8427 DOCREV CUR MEDS BY ELIG CLIN: HCPCS | Performed by: INTERNAL MEDICINE

## 2021-01-13 NOTE — PROGRESS NOTES
SPORTS MEDICINE AND PRIMARY CARE  Jeffery Esparza MD, [address] [address]  Phone:  [phone]  Fax: [phone]       Chief Complaint   Patient presents with   [facility]   . SUBJECTIVE:    Pj Esquivel is a 76 y.o. female *Patient returns today for evaluation. Since we last saw her, she sent a basket of goodies from [employer] with a note, \"Peace, millie, hope and love to you and your team in the [address] [place] [address]. Thank you for your time, attention and care. \"      She comes in today after a fall with a known history of obesity, pulmonary nodules, prediabetes, status post gastric bypass, primary hypertension, treated hepatitis C, and is seen for evaluation. She had a CT scan performed by [address] [place] [address], [address] [place] [address], for follow up of pulmonary nodules and they compared it to a CT scan on 10/15/19 that was performed on 01/09/20, and stable nodules dating back to 07/18/18, with a nodular opacity of the right     fissure, triangular in shape, and coronal images measures 8 mm and is unchanged and is most likely a fissural nodule that is probably benign, and nodular density outer to the right lower lobe medially and laterally in the right middle lobe. These nodules measure all approximately 3 mm in size. Patient comes in today for evaluation. Patient states on Sunday she fell. She had gone to [employer], had communion outside, and decided to go for a walk in the park. She was walking as normal and must have slipped or missed a step and fell forward, trying to catch herself with her hands, did not hit her head, did not pass out, and there were two other ladies walking who came over and helped her up.   She went over to a bench and sat down for about 20 minutes, decided not to go to the emergency room, and has had pain in her wrists bilaterally, the right arm, particularly to the point that she has trouble taking care of her activities of daily living because of discomfort in her right arm. Other new complaints denied and patient is seen for evaluation. There is no injury or pain in the lower extremities. Current Outpatient Medications   Medication Sig Dispense Refill    hydroCHLOROthiazide (HYDRODIURIL) 12.5 mg tablet TAKE 1 TABLET BY MOUTH EVERY DAY 90 Tab 3    albuterol (PROVENTIL HFA, VENTOLIN HFA, PROAIR HFA) 90 mcg/actuation inhaler Take 2 Puffs by inhalation every four (4) hours as needed for Wheezing. 1 Inhaler 11    budesonide-formoterol (SYMBICORT) 160-4.5 mcg/actuation HFAA Take 2 Puffs by inhalation two (2) times a day. 1 Inhaler 11    sodium chloride (OCEAN) 0.65 % nasal squeeze bottle 0.1 mL by Both Nostrils route four (4) times daily. 45 mL 11    fluticasone propionate (FLONASE) 50 mcg/actuation nasal spray 2 Sprays by Both Nostrils route daily. 1 Bottle 11    PARoxetine (PAXIL) 10 mg tablet Take 1 Tab by mouth daily. 30 Tab 3    CYANOCOBALAMIN, VITAMIN B-12, (VITAMIN B-12 PO) Take 1 Tab by mouth daily.  multivitamin (ONE A DAY) tablet Take 1 Tab by mouth daily.  VITAMIN A PO Take 1 Tab by mouth daily. Past Medical History:   Diagnosis Date    RATLIFF (dyspnea on exertion) 06/03/2019    Encounter for Hemoccult screening 07/19/2017    neg    Fall 01/11/2021    H/O cardiovascular stress test 06/17/2019    LVEF equals 59%. Left ventricular wall motion and thickening is normal    H/O gastric bypass 2003    md roshni    Hepatitis C     rx ended [id number] viral load non detected, syl luna md    Hypertension     Laryngitis     Normal cardiac stress test 6-2-06/ 6/17/19    LVEF equals 59%.  Left ventricular wall motion and thickening is normal    Obesity     Plantar fasciitis of right foot     Prediabetes     Pulmonary nodule, right 11-22-15  /  6-2-16    7mm  - repeat 6 mo  stable repeat 6/2/17  - [facility] rad   no change multiple pul nodules [facility] to [facility] 7/17/18    Pulmonary nodules 01/09/2020    stable 0no new nodules - high risk pt f/u in 12 months    Renal cyst, right     S/P colonoscopy 2010    Shoulder pain, right     Wellness examination 01/09/2018     Past Surgical History:   Procedure Laterality Date    HX COLONOSCOPY      HX GYN       Allergies   Allergen Reactions    Codeine Hives         REVIEW OF SYSTEMS:  General: negative for - chills or fever  ENT: negative for - headaches, nasal congestion or tinnitus  Respiratory: negative for - cough, hemoptysis, shortness of breath or wheezing  Cardiovascular : negative for - chest pain, edema, palpitations or shortness of breath  Gastrointestinal: negative for - abdominal pain, blood in stools, heartburn or nausea/vomiting  Genito-Urinary: no dysuria, trouble voiding, or hematuria  Musculoskeletal: negative for - gait disturbance, joint pain, joint stiffness or joint swelling  Neurological: no TIA or stroke symptoms  Hematologic: no bruises, no bleeding, no swollen glands  Integument: no lumps, mole changes, nail changes or rash  Endocrine: no malaise/lethargy or unexpected weight changes      Social History     Socioeconomic History    Marital status:      Spouse name: Not on file    Number of children: Not on file    Years of education: Not on file    Highest education level: Not on file   Tobacco Use    Smoking status: Never Smoker    Smokeless tobacco: Never Used   Substance and Sexual Activity    Alcohol use:  Yes     Alcohol/week: 1.7 standard drinks     Types: 2 Glasses of wine per week     Frequency: 2-4 times a month     Drinks per session: 1 or 2     Binge frequency: Less than monthly     Comment: occasional    Drug use: No    Sexual activity: Yes     Partners: Male   Social History Narrative    Medical History: hepatitis C - how urbano luna mdUpper pole renal cystGastroesophageal reflux diseaseVenous stasis ulcersHistory of phlebitisJoe    joint disease kneeShe had endometriosisObesitynegative stress Cardiolite 2006 ejection fraction 62%Family history ovarian    cancerSummer  ophthalmological evaluation    Gyn History: Last pap date 2014. Surgical History: arthroscopic long limb gastric bypass w ith Tom-en-Y gastrojejunostomy md bryn 2003colonoscopy     Hospitalization/Major Diagnostic Procedure: Denies Past Hospitalization        Family History: Mother:  79 yrs Ovarian cancer w ith metastatic disease Father:  80, ca appendix  Sister(s): alive Brother(s): alive Daughter(s):    alive Son(s): alive 3 brother(s) wai saldana - mass mesentery, 1 sister(s) . 1 son(s) , 1 daughter(s) . Social History: Alcohol Use Patient does not use alcohol. Smoking Status Patient is a never smoker. Marital Status: W idow , W idow . Lives    w ith: alone. Occupation/W ork: employed full time teacher. Education/School: has highschool diploma, has college diploma [employer].retired 10/31/20 rps 33 yrs     Family History   Problem Relation Age of Onset    Cancer Mother        OBJECTIVE:    Visit Vitals  BP (!) 144/86   Pulse 78   Temp 97.7 °F (36.5 °C) (Oral)   Resp 16   Ht 5' 1\" (1.549 m)   Wt 200 lb 14.4 oz (91.1 kg)   SpO2 98%   BMI 37.96 kg/m²     CONSTITUTIONAL: well , well nourished, appears age appropriate  EYES: perrla, eom intact  ENMT:moist mucous membranes, pharynx clear  NECK: supple. Thyroid normal  RESPIRATORY: Chest: clear bilaterally   CARDIOVASCULAR: Heart: regular rate and rhythm  GASTROINTESTINAL: Abdomen: soft, bowel sounds active  HEMATOLOGIC: no pathological lymph nodes palpated  MUSCULOSKELETAL: Extremities: no edema, pulse 1+   INTEGUMENT: No unusual rashes or suspicious skin lesions noted. Nails appear normal.  NEUROLOGIC: non-focal exam   MENTAL STATUS: alert and oriented, appropriate affect           ASSESSMENT:  1. Fall, initial encounter    2.  Severe obesity with body mass index (BMI) of 35.0 to 39.9 with serious comorbidity (HCC)    3. Pulmonary nodules 4. Prediabetes    5. H/O gastric bypass    6. Essential hypertension    7. Chronic hepatitis C without hepatic coma (HCC)      I think the fall was related to a slip, I do not think it was medically related, that is no syncope. She has discomfort in both wrists, as well as right forearm, and I think this all represents sprain and contusion related to fall. We will take x-rays to exclude hairline fractures. We do note that since we last saw her she had lost another pound and the next time we see her we expect her to be out of the 200 club. She had pulmonary nodules noted on CT scan January of last year. We will repeat CT scan at the same institution and compare to confirm they are stable. They were felt to be stable on the last visit. Known history of prediabetes, which has been stable. She is status post gastric bypass. Blood pressure is up a little bit, as would be expected after the fall, and we just continue to monitor. I do not think any adjustment is needed in blood pressure medicine. She will be back to see us in two to three months, sooner if needed. I have discussed the diagnosis with the patient and the intended plan as seen in the  Orders. The patient understands and agees with the plan. The patient has   received an after visit summary and questions were answered concerning  future plans  Patient labs and/or xrays were reviewed  Past records were reviewed. PLAN:  .  Orders Placed This Encounter    XR WRIST LT AP/LAT    XR WRIST RT AP/LAT    XR FOREARM RT AP/LAT    CT CHEST WO CONT       Follow-up and Dispositions    · Return in about 3 months (around 4/13/2021). ATTENTION:   This medical record was transcribed using an electronic medical records system. Although proofread, it may and can contain electronic and spelling errors. Other human spelling and other errors may be present. Corrections may be executed at a later time.   Please feel free to contact us for any clarifications as needed.

## 2021-01-13 NOTE — PROGRESS NOTES
1. Have you been to the ER, urgent care clinic since your last visit? Hospitalized since your last visit? No    2. Have you seen or consulted any other health care providers outside of the [address] [place] [address] since your last visit? Include any pap smears or colon screening.  No      Wants to discuss fall

## 2021-01-18 DIAGNOSIS — G89.29 CHRONIC RIGHT SHOULDER PAIN: Primary | ICD-10-CM

## 2021-01-18 DIAGNOSIS — M25.511 CHRONIC RIGHT SHOULDER PAIN: Primary | ICD-10-CM

## 2021-01-19 ENCOUNTER — HOSPITAL ENCOUNTER (OUTPATIENT)
Dept: PHYSICAL THERAPY | Age: 66
Discharge: HOME | End: 2021-01-19
Payer: MEDICARE

## 2021-01-19 PROCEDURE — 97161 PT EVAL LOW COMPLEX 20 MIN: CPT | Performed by: PHYSICAL THERAPIST

## 2021-01-19 PROCEDURE — 97140 MANUAL THERAPY 1/> REGIONS: CPT | Performed by: PHYSICAL THERAPIST

## 2021-01-19 NOTE — PROGRESS NOTES
PT INITIAL EVALUATION NOTE - [facility] 2-15    Patient Name: Polo Esteban  Date:2021  : 1955  [x]  Patient  Verified  Payor: Toy Fleischer / Plan: [facility] MEDICARE COMPLETE / Product Type: Managed Care Medicare /    In time:920a  Out time:1035a  Total Treatment Time (min): 75  Total Timed Codes (min): 15  1:1 Treatment Time [facility] only):15  Visit #: 1     Treatment Area: Wrist pain [M25.539]  Arm pain [M79.603]    SUBJECTIVE  Pain Level (0-10 scale): 9/10    7-10/10  Any medication changes, allergies to medications, adverse drug reactions, diagnosis change, or new procedure performed?: [] No    [x] Yes (see summary sheet for update)  Subjective: The pt is a 76 y.o. female referred for wrist pain s/p fall. She reports that she was walking on a trail and fell and landed on her arms. 2 ladies were able to help her get up. The pt reports that she thought she would be sore, but after a few days her right wrist was still  painful. The pt reports that gripping, turning on the car and opening bottle is hard. Taking advil as needed and this is helping some. Has not iced. Right handed. PLOF: independent at home and with IADLS.    Mechanism of Injury: [address]  Previous Treatment/Compliance: x-ray neg for hand and forearm  PMHx/Surgical Hx: none reported  Work Hx: retired  Living Situation: lives alone  Pt Goals: decrease pain, return to independence with IADLS  Barriers: none  Motivation: good  Substance use: none  FABQ Score: -  Cognition: A & O x 4       OBJECTIVE/EXAMINATION    Functional : right: 9, 10, 9.7 lbs Average 9.6 lb left 27, 33, 29.6lbs Average:  29.9lbs  Palpation: Pain along forearm extensors, no pain into carpals to metacarpals     Wrist ROM:    Right    Left   Flexion   80   85   Extension  70p!   80   Supination  40p!   90   Pronation  90   90        UPPER QUARTER   MUSCLE STRENGTH  KEY       R  L  0 - No Contraction   Flexion  4-  5  1 - Trace    Extension 4-p!  5  2 - Poor    Ulnar Dev. 4  5  3 - Fair     Radial Dev. 4  5  4 - Good    Supination 4-p!  5  5 - Normal    Pronation 4  5       Neurological: Reflexes / Sensations :nml    Modality rationale: decrease inflammation and decrease pain to improve the patients ability to perform daily IADLS pain free. Min Type Additional Details    [] Estim: []Att   []Unatt        []TENS instruct                  []IFC  []Premod   []NMES                     []Other:  []w/US   []w/ice   []w/heat  Position:  Location:    []  Traction: [] Cervical       []Lumbar                       [] Prone          []Supine                       []Intermittent   []Continuous Lbs:  [] before manual  [] after manual  []w/heat    []  Ultrasound: []Continuous   [] Pulsed at:                           []1MHz   []3MHz Location:  W/cm2:    [] Paraffin         Location:   []w/heat   10 [x]  Ice     []  Heat  []  Ice massage Position: sitting  Location: wrist    []  Laser  []  Other: Position:  Location:      []  Vasopneumatic Device Pressure:       [] lo [] med [] hi   Temperature:      [x] Skin assessment post-treatment:  [x]intact []redness- no adverse reaction    []redness  adverse reaction:     5 min Therapeutic Exercise:  [x] See flow sheet : Wrist, elbow ROM, gentle putty squeezes   Rationale: increase ROM, increase strength and increase proprioception to improve the patients ability to return to daily activities pain free. 10 min Manual Therapy: STM to wrist extensors, KT tape   Rationale: decrease pain, increase ROM, increase tissue extensibility and decrease edema  to improve the patients ability to return to daily activities pain free.                With   [] TE   [] TA   [] Neuro   [] SC   [] other: Patient Education: [x] Review HEP    [] Progressed/Changed HEP based on:   [] positioning   [] body mechanics   [] transfers   [] heat/ice application    [] other:      Other Objective/Functional Measures: -    Pain Level (0-10 scale) post treatment: none at rest, 9/10 with movement    ASSESSMENT/Changes in Function:     [x]  See Plan of Sammie E Pk Carvalho, PT, DPT 1/19/2021

## 2021-01-19 NOTE — PROGRESS NOTES
Physical Therapy at [facility],   a part of [address] [place] [address], [address] [place] [address], [address] [place] [address]  Phone: [phone]  Fax: [phone]      Plan of Care/Statement of Necessity for Physical Therapy Services  2-15    Patient name: Shahbaz Durán  : 1955  Provider#: [id number]  Referral source: Sindy Barry, *      Medical/Treatment Diagnosis: Wrist pain [M25.539]  Arm pain [M79.603]     Prior Hospitalization: see medical history     Comorbidities: -  Prior Level of Function: walking, crafts  Medications: Verified on Patient Summary List  Start of Care: 2021      Onset Date: 2021   The Plan of Care and following information is based on the information from the initial evaluation. Assessment/ key information: The pt is a 76 y.o. female referred for the evaluation and treatment of right wrist and forearm pain. SHe presents with acute onset wrist sprain that occurred s/p a fall on . She has significant limitation in  strength and decreased supination. Pain to palpation along dorsal forearm. The pt would benefit from skilled physical therapy in order to address these impairments and to return her  to maximal level of function pain free.       Evaluation Complexity History LOW Complexity : Zero comorbidities / personal factors that will impact the outcome / POC; Examination LOW Complexity : 1-2 Standardized tests and measures addressing body structure, function, activity limitation and / or participation in recreation  ;Presentation LOW Complexity : Stable, uncomplicated  ;Clinical Decision Making MEDIUM Complexity : FOTO score of 26-74  Overall Complexity Rating: LOW     Problem List: pain affecting function, decrease ROM, decrease strength, decrease ADL/ functional abilitiies and decrease activity tolerance   Treatment Plan may include any combination of the following: Therapeutic exercise, Therapeutic activities, Neuromuscular re-education, Physical agent/modality, Manual therapy, Patient education and Self Care training  Patient / Family readiness to learn indicated by: asking questions, trying to perform skills and interest  Persons(s) to be included in education: patient (P)  Barriers to Learning/Limitations: None  Patient Goal (s): decrease pain   Patient Self Reported Health Status: good  Rehabilitation Potential: good    Short Term Goals: To be accomplished in 3 weeks:  1) Pt will be independent with HEP. 2) Pt will demonstrate wrist ext >/= 80 degrees without pain. 3) Pt will demonstrate forearm supination >/= 65 degrees without pain. 4)  strength will show progress by >/= 5 lbs    Long Term Goals: To be accomplished in 6 weeks:  1) Pt will be able to use right arm to perform all IALDS  2) Pt will demonstrate  strength >/= 80% of uninvolved side. 3) Pt will have full ROM and strength in order to progress back to function with no limitations. Frequency / Duration: Patient to be seen 2 times per week for 4-6 weeks. Patient/ Caregiver education and instruction: self care, activity modification, brace/ splint application and exercises    [x]  Plan of care has been reviewed with PTA      Certification Period: 1/19/2021 -4/19/2021  Keira Moody, PT, DPT 1/19/2021     ________________________________________________________________________    I certify that the above Therapy Services are being furnished while the patient is under my care. I agree with the treatment plan and certify that this therapy is necessary.     [de-identified] Signature:____________________  Date:____________Time: _________

## 2021-01-21 ENCOUNTER — APPOINTMENT (OUTPATIENT)
Dept: PHYSICAL THERAPY | Age: 66
End: 2021-01-21

## 2021-01-22 ENCOUNTER — HOSPITAL ENCOUNTER (OUTPATIENT)
Dept: PHYSICAL THERAPY | Age: 66
Discharge: HOME | End: 2021-01-22
Payer: MEDICARE

## 2021-01-22 PROCEDURE — 97110 THERAPEUTIC EXERCISES: CPT

## 2021-01-22 PROCEDURE — 97140 MANUAL THERAPY 1/> REGIONS: CPT

## 2021-01-22 NOTE — PROGRESS NOTES
PT DAILY TREATMENT NOTE - [facility] 2-15    Patient Name: Shi Rdz  Date:2021  : 1955  [x]  Patient  Verified  Payor: Narendra Polanco / Plan: [facility] MEDICARE COMPLETE / Product Type: Managed Care Medicare /    In time: 8:30A  Out time: 9:40A  Total Treatment Time (min): 70  Total Timed Codes (min): 60  1:1 Treatment Time ( [address] only): 45   Visit #:  2    Treatment Area: Wrist pain [M25.539]  Arm pain [M79.603]    SUBJECTIVE  Pain Level (0-10 scale): 710  Any medication changes, allergies to medications, adverse drug reactions, diagnosis change, or new procedure performed?: [x] No    [] Yes (see summary sheet for update)  Subjective functional status/changes:   [] No changes reported  Pt reported having  Lot of pain while trying to drive this morning. 9/10 she felt in both wrists. Also felt she lost contorl of the car a couple of times. Still challenged with ADLs dressing and bathing . OBJECTIVE:            Modality rationale: decrease inflammation and decrease pain to improve the patients ability to perform daily IADLS pain free. Min Type Additional Details     []? Estim: []? Att   []? Unatt        []? TENS instruct                  []?IFC  []? Premod   []? NMES                     []?Other:  []?w/US   []?w/ice   []?w/heat  Position:  Location:     []? Traction: []? Cervical       []? Lumbar                       []? Prone          []? Supine                       []?Intermittent   []? Continuous Lbs:  []? before manual  []? after manual  []?w/heat     []? Ultrasound: []? Continuous   []? Pulsed at:                           []? 1MHz   []? 3MHz Location:  W/cm2:     []? Paraffin         Location:   []?w/heat   10 [x]? Ice     []? Heat  []? Ice massage Position: sitting  Location: wrist     []? Laser  []? Other: Position:  Location:        []? Vasopneumatic Device Pressure:       []? lo []? med []? hi   Temperature:       [x]? Skin assessment post-treatment:  [x]? intact []?redness- no adverse reaction    []? redness  adverse reaction:      45 min Therapeutic Exercise:  [x]? See flow sheet : Wrist, elbow ROM, gentle putty squeezes   Rationale: increase ROM, increase strength and increase proprioception to improve the patients ability to return to daily activities pain free.         15 min Manual Therapy: STM to wrist extensors, KT tape   Rationale: decrease pain, increase ROM, increase tissue extensibility and decrease edema  to improve the patients ability to return to daily activities pain free.        With   [] TE   [] TA   [] Neuro   [] SC   [] other: Patient Education: [x] Review HEP    [] Progressed/Changed HEP based on:   [] positioning   [] body mechanics   [] transfers   [] heat/ice application    [] other:      Other Objective/Functional Measures: --     Pain Level (0-10 scale) post treatment: \"better\"    ASSESSMENT/Changes in Function:   Pt will look for brace over the weekend. Noted pt unable to fully supinate secondary to increase in pain bot actively and passively. Patient will continue to benefit from skilled PT services to modify and progress therapeutic interventions, address functional mobility deficits, address ROM deficits, address strength deficits, analyze and address soft tissue restrictions, analyze and cue movement patterns, analyze and modify body mechanics/ergonomics and assess and modify postural abnormalities to attain remaining goals. []  See Plan of Care  []  See progress note/recertification  []  See Discharge Summary         Progress towards goals / Updated goals:  Short Term Goals: To be accomplished in 3 weeks:  1) Pt will be independent with HEP. 2) Pt will demonstrate wrist ext >/= 80 degrees without pain. 3) Pt will demonstrate forearm supination >/= 65 degrees without pain. 4)  strength will show progress by >/= 5 lbs     Long Term Goals:  To be accomplished in 6 weeks:  1) Pt will be able to use right arm to perform all IALDS  2) Pt will demonstrate  strength >/= 80% of uninvolved side. 3) Pt will have full ROM and strength in order to progress back to function with no limitations.      Frequency / Duration: Patient to be seen 2 times per week for 4-6 weeks.     PLAN  [x]  Upgrade activities as tolerated     [x]  Continue plan of care  []  Update interventions per flow sheet       []  Discharge due to:_  []  Other:_      Tong Islas PTA, [facility] 1/22/2021

## 2021-01-26 ENCOUNTER — HOSPITAL ENCOUNTER (OUTPATIENT)
Dept: CT IMAGING | Age: 66
Discharge: HOME | End: 2021-01-26
Attending: INTERNAL MEDICINE
Payer: MEDICARE

## 2021-01-26 ENCOUNTER — APPOINTMENT (OUTPATIENT)
Dept: PHYSICAL THERAPY | Age: 66
End: 2021-01-26
Payer: MEDICARE

## 2021-01-26 DIAGNOSIS — R91.8 PULMONARY NODULES: ICD-10-CM

## 2021-01-26 DIAGNOSIS — R91.1 LEFT LOWER LOBE PULMONARY NODULE: Primary | ICD-10-CM

## 2021-01-26 PROCEDURE — 71250 CT THORAX DX C-: CPT

## 2021-01-29 ENCOUNTER — HOSPITAL ENCOUNTER (OUTPATIENT)
Dept: PHYSICAL THERAPY | Age: 66
Discharge: HOME | End: 2021-01-29
Payer: MEDICARE

## 2021-01-29 PROCEDURE — 97110 THERAPEUTIC EXERCISES: CPT | Performed by: PHYSICAL THERAPIST

## 2021-01-29 PROCEDURE — 97140 MANUAL THERAPY 1/> REGIONS: CPT | Performed by: PHYSICAL THERAPIST

## 2021-01-29 NOTE — PROGRESS NOTES
PT DAILY TREATMENT NOTE - [facility] 2-15    Patient Name: Choco Guzman  Date:2021  : 1955  [x]  Patient  Verified  Payor: Cristopher Davila / Plan: [facility] MEDICARE COMPLETE / Product Type: Managed Care Medicare /    In LMRN:[id number]  Out time: 1230p  Total Treatment Time (min): 45  Total Timed Codes (min): 35  1:1 Treatment Time ([facility] only): 25   Visit #:  3    Treatment Area: Wrist pain [M25.539]  Arm pain [M79.603]    SUBJECTIVE  Pain Level (0-10 scale): 10  Any medication changes, allergies to medications, adverse drug reactions, diagnosis change, or new procedure performed?: [x] No    [] Yes (see summary sheet for update)  Subjective functional status/changes:   [] No changes reported  Pt reports feeling better using the brace for sure. OBJECTIVE:            Modality rationale: decrease inflammation and decrease pain to improve the patients ability to perform daily IADLS pain free. Min Type Additional Details     []? Estim: []? Att   []? Unatt        []? TENS instruct                  []?IFC  []? Premod   []? NMES                     []?Other:  []?w/US   []?w/ice   []?w/heat  Position:  Location:     []? Traction: []? Cervical       []? Lumbar                       []? Prone          []? Supine                       []?Intermittent   []? Continuous Lbs:  []? before manual  []? after manual  []?w/heat     []? Ultrasound: []? Continuous   []? Pulsed at:                           []? 1MHz   []? 3MHz Location:  W/cm2:     []? Paraffin         Location:   []?w/heat   10 [x]? Ice     []? Heat  []? Ice massage Position: sitting  Location: wrist     []? Laser  []? Other: Position:  Location:        []? Vasopneumatic Device Pressure:       []? lo []? med []? hi   Temperature:       [x]? Skin assessment post-treatment:  [x]? intact []? redness- no adverse reaction    []? redness  adverse reaction:      20 min Therapeutic Exercise:  [x]?  See flow sheet : Wrist, elbow ROM, gentle putty squeezes   Rationale: increase ROM, increase strength and increase proprioception to improve the patients ability to return to daily activities pain free.         15 min Manual Therapy: STM to wrist extensors and flexors   Rationale: decrease pain, increase ROM, increase tissue extensibility and decrease edema  to improve the patients ability to return to daily activities pain free.        With   [] TE   [] TA   [] Neuro   [] SC   [] other: Patient Education: [x] Review HEP    [] Progressed/Changed HEP based on:   [] positioning   [] body mechanics   [] transfers   [] heat/ice application    [] other:      Other Objective/Functional Measures:supination 65 degrees active toady (40 at initial)    Pain Level (0-10 scale) post treatment: \"better\"    ASSESSMENT/Changes in Function:   Pt is progressing with ROM and is feeling better in the brace. Given putty to work on at home to improve  strength. Still limited with IADLS, but noticing a small improvement with hygiene ect. Patient will continue to benefit from skilled PT services to modify and progress therapeutic interventions, address functional mobility deficits, address ROM deficits, address strength deficits, analyze and address soft tissue restrictions, analyze and cue movement patterns, analyze and modify body mechanics/ergonomics and assess and modify postural abnormalities to attain remaining goals. []  See Plan of Care  []  See progress note/recertification  []  See Discharge Summary         Progress towards goals / Updated goals:  Short Term Goals: To be accomplished in 3 weeks:  1) Pt will be independent with HEP. MET  2) Pt will demonstrate wrist ext >/= 80 degrees without pain. 3) Pt will demonstrate forearm supination >/= 65 degrees without pain. MET  4)  strength will show progress by >/= 5 lbs     Long Term Goals:  To be accomplished in 6 weeks:  1) Pt will be able to use right arm to perform all IALDS Progressing  2) Pt will demonstrate  strength >/= 80% of uninvolved side. 3) Pt will have full ROM and strength in order to progress back to function with no limitations.      Frequency / Duration: Patient to be seen 2 times per week for 4-6 weeks.     PLAN  [x]  Upgrade activities as tolerated     [x]  Continue plan of care  []  Update interventions per flow sheet       []  Discharge due to:_  []  Other:_      Rj Britton, PT, DPT,1/29/2021

## 2021-02-03 ENCOUNTER — HOSPITAL ENCOUNTER (OUTPATIENT)
Dept: PHYSICAL THERAPY | Age: 66
Discharge: HOME | End: 2021-02-03
Payer: MEDICARE

## 2021-02-03 PROCEDURE — 97140 MANUAL THERAPY 1/> REGIONS: CPT

## 2021-02-03 PROCEDURE — 97110 THERAPEUTIC EXERCISES: CPT

## 2021-02-03 NOTE — PROGRESS NOTES
PT DAILY TREATMENT NOTE - [facility] -15    Patient Name: Belkis Lozada  Date:2/3/2021  : 1955  [x]  Patient  Verified  Payor: Meenabrayan Stands / Plan: [facility] MEDICARE COMPLETE / Product Type: Managed Care Medicare /    In time: 1:00P  Out time: 1:50P  Total Treatment Time (min): 50  Total Timed Codes (min): 40  1:1 Treatment Time ([facility] only): 35   Visit #:  4    Treatment Area: Right arm pain [M79.601]  Acute pain of right wrist [M25.531]    SUBJECTIVE  Pain Level (0-10 scale): 6/10  Any medication changes, allergies to medications, adverse drug reactions, diagnosis change, or new procedure performed?: [x]? No    []? Yes (see summary sheet for update)  Subjective functional status/changes:   []? No changes reported  Pt reports her wrist continues to get better slowly. She can pinpoint where the sore spots are now. Where before it hurt globally.      OBJECTIVE:                  Modality rationale: decrease inflammation and decrease pain to improve the patients ability to perform daily IADLS pain free.      Min Type Additional Details     []? ? Estim: []??Att   []? ? Unatt        []? ?TENS instruct                  []? ?IFC  []? ?Premod   []? ?NMES                     []? ? Other:  []??w/US   []? ?w/ice   []? ?w/heat  Position:  Location:     []? ?  Traction: []?? Cervical       []? ?Lumbar                       []? ? Prone          []? ?Supine                       []? ?Intermittent   []? ? Continuous Lbs:  []?? before manual  []? ? after manual  []? ?w/heat     []? ?  Ultrasound: []??Continuous   []? ? Pulsed at:                           []? ?1MHz   []? ? 3MHz Location:  W/cm2:     []? ? Paraffin         Location:   []??w/heat   10 [x]? ?  Ice     []? ?  Heat  []? ?  Ice massage Position: sitting  Location: wrist     []? ?  Laser  []? ?  Other: Position:  Location:        []? ?  Vasopneumatic Device Pressure:       []? ? lo []? ? med []?? hi   Temperature:       [x]? ? Skin assessment post-treatment:  [x]? ? intact []??redness- no adverse reaction    []? ?redness  adverse reaction:      25 min Therapeutic Exercise:  [x]? ? See flow sheet : Wrist, elbow ROM, gentle putty squeezes   Rationale: increase ROM, increase strength and increase proprioception to improve the patients ability to return to daily activities pain free.         15 min Manual Therapy: STM to wrist extensors and flexors   Rationale: decrease pain, increase ROM, increase tissue extensibility and decrease edema  to improve the patients ability to return to daily activities pain free.         With   []? TE   []? TA   []? Neuro   []? SC   []? other: Patient Education: [x]? Review HEP    []? Progressed/Changed HEP based on:   []? positioning   []? body mechanics   []? transfers   []? heat/ice application    []? other:       Other Objective/Functional Measures: --     Pain Level (0-10 scale) post treatment: \"better\"     ASSESSMENT/Changes in Function:   Pt tolerated added exercises well today. With no increase in pain just fatigue. Patient will continue to benefit from skilled PT services to modify and progress therapeutic interventions, address functional mobility deficits, address ROM deficits, address strength deficits, analyze and address soft tissue restrictions, analyze and cue movement patterns, analyze and modify body mechanics/ergonomics and assess and modify postural abnormalities to attain remaining goals. []? See Plan of Care  []? See progress note/recertification  []? See Discharge Summary         Progress towards goals / Updated goals:  Short Term Goals: To be accomplished in 3 weeks:  1) Pt will be independent with HEP. MET  2) Pt will demonstrate wrist ext >/= 80 degrees without pain. 3) Pt will demonstrate forearm supination >/= 65 degrees without pain. MET  4)  strength will show progress by >/= 5 lbs     Long Term Goals: To be accomplished in 6 weeks:  1) Pt will be able to use right arm to perform all IALDS Progressing  2) Pt will demonstrate  strength >/= 80% of uninvolved side. 3) Pt will have full ROM and strength in order to progress back to function with no limitations.      Frequency / Duration: Patient to be seen 2 times per week for 4-6 weeks.     PLAN  [x]? Upgrade activities as tolerated     [x]? Continue plan of care  []? Update interventions per flow sheet       []? Discharge due to:_  []?   Other:_           Luanne Lang PTA, OPTA 2/3/2021

## 2021-02-05 ENCOUNTER — APPOINTMENT (OUTPATIENT)
Dept: PHYSICAL THERAPY | Age: 66
End: 2021-02-05
Payer: MEDICARE

## 2021-02-17 ENCOUNTER — TELEPHONE (OUTPATIENT)
Dept: FAMILY MEDICINE CLINIC | Age: 66
End: 2021-02-17

## 2021-02-17 NOTE — TELEPHONE ENCOUNTER
I left a couple of messages to patient to re-schedule. The third time able to reach her and re-schedule.

## 2021-02-19 ENCOUNTER — IMMUNIZATION (OUTPATIENT)
Dept: FAMILY MEDICINE CLINIC | Age: 66
End: 2021-02-19

## 2021-02-19 DIAGNOSIS — Z23 ENCOUNTER FOR IMMUNIZATION: Primary | ICD-10-CM

## 2021-02-19 PROCEDURE — 91301 COVID-19, MRNA, LNP-S, PF, 100MCG/0.5ML DOSE(MODERNA): CPT

## 2021-02-19 PROCEDURE — 0011A COVID-19, MRNA, LNP-S, PF, 100MCG/0.5ML DOSE(MODERNA): CPT

## 2021-03-19 ENCOUNTER — IMMUNIZATION (OUTPATIENT)
Dept: FAMILY MEDICINE CLINIC | Age: 66
End: 2021-03-19

## 2021-03-19 DIAGNOSIS — Z23 ENCOUNTER FOR IMMUNIZATION: Primary | ICD-10-CM

## 2021-03-19 PROCEDURE — 0012A COVID-19, MRNA, LNP-S, PF, 100MCG/0.5ML DOSE(MODERNA): CPT | Performed by: FAMILY MEDICINE

## 2021-03-19 PROCEDURE — 91301 COVID-19, MRNA, LNP-S, PF, 100MCG/0.5ML DOSE(MODERNA): CPT | Performed by: FAMILY MEDICINE

## 2021-04-12 NOTE — ANCILLARY DISCHARGE INSTRUCTIONS
Physical Therapy at [facility],  
a part of [facility] [place] 
[address] [address], [address] [place] Phone: [phone]  Fax: [phone] Discharge Summary  2-15 Patient name: Renay Fleischer                : 1955                        Provider#: [id number] Referral source: Echo Medina, * Medical/Treatment Diagnosis: Wrist pain [M25.539] Arm pain [M79.603] Prior Hospitalization: see medical history Comorbidities: - 
Prior Level of Function: walking, crafts Medications: Verified on Patient Summary List 
Start of Care: 2021                                                                              Onset Date: 2021 Visits from Start of Care: 4     Missed Visits: 1 Reporting Period : 21 to 2/3/21 Progress towards goals / Updated goals: 
Short Term Goals: To be accomplished in 3 weeks: 
1) Pt will be independent with HEP. MET 
2) Pt will demonstrate wrist ext >/= 80 degrees without pain. Progressing 3) Pt will demonstrate forearm supination >/= 65 degrees without pain. MET 
4)  strength will show progress by >/= 5 lbs 
  
Long Term Goals: To be accomplished in 6 weeks: NOT MET 
1) Pt will be able to use right arm to perform all IALDS Progressing 2) Pt will demonstrate  strength >/= 80% of uninvolved side. 3) Pt will have full ROM and strength in order to progress back to function with no limitations.  
  
Frequency / Duration: Patient to be seen 2 times per week for 4-6 weeks. ASSESSMENT/SUMMARY OF CARE: The pt was seen for 4 sessions in PT and made good progress during that treatment time. She failed to schedule past her last appointment and will be discharged at this time. She was overall having less pain, however did not meet her long term goals of return to function. ROM measured on 1/29: supination 65 degrees active toady (40 at initial) 
  
 
RECOMMENDATIONS: 
[x]Discontinue therapy: [x]Patient was progressing toward set goals [x]Patient is non-compliant or has abdicated 
    []Due to lack of appreciable progress towards set goals Kary Valentine, PT, DPT 4/12/2021

## 2021-04-28 RX ORDER — HYDROCHLOROTHIAZIDE 12.5 MG/1
TABLET ORAL
Qty: 90 TAB | Refills: 3 | Status: SHIPPED | OUTPATIENT
Start: 2021-04-28 | End: 2022-05-05 | Stop reason: SDUPTHER

## 2021-07-26 ENCOUNTER — HOSPITAL ENCOUNTER (OUTPATIENT)
Dept: CT IMAGING | Age: 66
Discharge: HOME | End: 2021-07-26
Attending: INTERNAL MEDICINE
Payer: MEDICARE

## 2021-07-26 DIAGNOSIS — R91.1 LEFT LOWER LOBE PULMONARY NODULE: ICD-10-CM

## 2021-07-26 PROCEDURE — 71250 CT THORAX DX C-: CPT

## 2021-08-02 ENCOUNTER — TRANSCRIBE ORDER (OUTPATIENT)
Dept: SCHEDULING | Age: 66
End: 2021-08-02

## 2021-08-02 DIAGNOSIS — Z12.31 VISIT FOR SCREENING MAMMOGRAM: Primary | ICD-10-CM

## 2021-08-06 ENCOUNTER — HOSPITAL ENCOUNTER (OUTPATIENT)
Dept: MAMMOGRAPHY | Age: 66
Discharge: HOME | End: 2021-08-06
Attending: INTERNAL MEDICINE
Payer: MEDICARE

## 2021-08-06 DIAGNOSIS — Z12.31 VISIT FOR SCREENING MAMMOGRAM: ICD-10-CM

## 2021-08-06 PROCEDURE — 77063 BREAST TOMOSYNTHESIS BI: CPT

## 2021-12-08 ENCOUNTER — OFFICE VISIT (OUTPATIENT)
Dept: INTERNAL MEDICINE CLINIC | Age: 66
End: 2021-12-08
Payer: MEDICARE

## 2021-12-08 VITALS
OXYGEN SATURATION: 98 % | TEMPERATURE: 98.1 F | DIASTOLIC BLOOD PRESSURE: 82 MMHG | HEIGHT: 61 IN | BODY MASS INDEX: 37.7 KG/M2 | SYSTOLIC BLOOD PRESSURE: 128 MMHG | RESPIRATION RATE: 16 BRPM | HEART RATE: 61 BPM | WEIGHT: 199.7 LBS

## 2021-12-08 DIAGNOSIS — Z00.00 MEDICARE ANNUAL WELLNESS VISIT, SUBSEQUENT: Primary | ICD-10-CM

## 2021-12-08 DIAGNOSIS — R73.02 IGT (IMPAIRED GLUCOSE TOLERANCE): ICD-10-CM

## 2021-12-08 DIAGNOSIS — Z13.31 SCREENING FOR DEPRESSION: ICD-10-CM

## 2021-12-08 DIAGNOSIS — E66.01 SEVERE OBESITY WITH BODY MASS INDEX (BMI) OF 35.0 TO 39.9 WITH SERIOUS COMORBIDITY (HCC): ICD-10-CM

## 2021-12-08 DIAGNOSIS — I10 PRIMARY HYPERTENSION: ICD-10-CM

## 2021-12-08 DIAGNOSIS — B18.2 CHRONIC HEPATITIS C WITHOUT HEPATIC COMA (HCC): ICD-10-CM

## 2021-12-08 DIAGNOSIS — R52 PAIN: ICD-10-CM

## 2021-12-08 PROCEDURE — 99213 OFFICE O/P EST LOW 20 MIN: CPT | Performed by: INTERNAL MEDICINE

## 2021-12-08 PROCEDURE — 3017F COLORECTAL CA SCREEN DOC REV: CPT | Performed by: INTERNAL MEDICINE

## 2021-12-08 PROCEDURE — G8400 PT W/DXA NO RESULTS DOC: HCPCS | Performed by: INTERNAL MEDICINE

## 2021-12-08 PROCEDURE — G0439 PPPS, SUBSEQ VISIT: HCPCS | Performed by: INTERNAL MEDICINE

## 2021-12-08 PROCEDURE — G9899 SCRN MAM PERF RSLTS DOC: HCPCS | Performed by: INTERNAL MEDICINE

## 2021-12-08 PROCEDURE — 1090F PRES/ABSN URINE INCON ASSESS: CPT | Performed by: INTERNAL MEDICINE

## 2021-12-08 PROCEDURE — 1101F PT FALLS ASSESS-DOCD LE1/YR: CPT | Performed by: INTERNAL MEDICINE

## 2021-12-08 PROCEDURE — G8427 DOCREV CUR MEDS BY ELIG CLIN: HCPCS | Performed by: INTERNAL MEDICINE

## 2021-12-08 PROCEDURE — G8755 DIAS BP > OR = 90: HCPCS | Performed by: INTERNAL MEDICINE

## 2021-12-08 PROCEDURE — G8417 CALC BMI ABV UP PARAM F/U: HCPCS | Performed by: INTERNAL MEDICINE

## 2021-12-08 PROCEDURE — G8432 DEP SCR NOT DOC, RNG: HCPCS | Performed by: INTERNAL MEDICINE

## 2021-12-08 PROCEDURE — G8536 NO DOC ELDER MAL SCRN: HCPCS | Performed by: INTERNAL MEDICINE

## 2021-12-08 PROCEDURE — G8752 SYS BP LESS 140: HCPCS | Performed by: INTERNAL MEDICINE

## 2021-12-08 NOTE — PATIENT INSTRUCTIONS
Medicare Wellness Visit, Female     The best way to live healthy is to have a lifestyle where you eat a well-balanced diet, exercise regularly, limit alcohol use, and quit all forms of tobacco/nicotine, if applicable. Regular preventive services are another way to keep healthy. Preventive services (vaccines, screening tests, monitoring & exams) can help personalize your care plan, which helps you manage your own care. Screening tests can find health problems at the earliest stages, when they are easiest to treat. [facility] follows the current, evidence-based guidelines published by the [facility] Franck Smith ([facility]) when recommending preventive services for our patients. Because we follow these guidelines, sometimes recommendations change over time as research supports it. (For example, mammograms used to be recommended annually. Even though Medicare will still pay for an annual mammogram, the newer guidelines recommend a mammogram every two years for women of average risk). Of course, you and your doctor may decide to screen more often for some diseases, based on your risk and your co-morbidities (chronic disease you are already diagnosed with). Preventive services for you include:  - Medicare offers their members a free annual wellness visit, which is time for you and your primary care provider to discuss and plan for your preventive service needs. Take advantage of this benefit every year!  -All adults over the age of 72 should receive the recommended pneumonia vaccines. Current USPSTF guidelines recommend a series of two vaccines for the best pneumonia protection.   -All adults should have a flu vaccine yearly and a tetanus vaccine every 10 years.   -All adults age 48 and older should receive the shingles vaccines (series of two vaccines).       -All adults age 38-68 who are overweight should have a diabetes screening test once every three years.   -All adults born between [birth year] and 1965 should be screened once for Hepatitis C.  -Other screening tests and preventive services for persons with diabetes include: an eye exam to screen for diabetic retinopathy, a kidney function test, a foot exam, and stricter control over your cholesterol.   -Cardiovascular screening for adults with routine risk involves an electrocardiogram (ECG) at intervals determined by your doctor.   -Colorectal cancer screenings should be done for adults age 54-65 with no increased risk factors for colorectal cancer. There are a number of acceptable methods of screening for this type of cancer. Each test has its own benefits and drawbacks. Discuss with your doctor what is most appropriate for you during your annual wellness visit. The different tests include: colonoscopy (considered the best screening method), a fecal occult blood test, a fecal DNA test, and sigmoidoscopy.    -A bone mass density test is recommended when a woman turns 65 to screen for osteoporosis. This test is only recommended one time, as a screening. Some providers will use this same test as a disease monitoring tool if you already have osteoporosis. -Breast cancer screenings are recommended every other year for women of normal risk, age 54-69.  -Cervical cancer screenings for women over age 72 are only recommended with certain risk factors.      Here is a list of your current Health Maintenance items (your personalized list of preventive services) with a due date:  Health Maintenance Due   Topic Date Due    Bone Mineral Density   Never done    Colorectal Screening  12/23/2020    COVID-19 Vaccine (3 - Booster for Moderna series) 09/19/2021    Hemoglobin A1C    10/23/2021
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
## 2021-12-08 NOTE — PROGRESS NOTES
1. Have you been to the ER, urgent care clinic since your last visit? Hospitalized since your last visit? No    2. Have you seen or consulted any other health care providers outside of the [address] [place] [address] since your last visit? Include any pap smears or colon screening. No    Wants to discuss back pain  This is the Subsequent Medicare Annual Wellness Exam, performed 12 months or more after the Initial AWV or the last Subsequent AWV    I have reviewed the patient's medical history in detail and updated the computerized patient record. Assessment/Plan   Education and counseling provided:  Are appropriate based on today's review and evaluation         Depression Risk Factor Screening     3 most recent PHQ Screens 12/8/2021   Little interest or pleasure in doing things Not at all   Feeling down, depressed, irritable, or hopeless Not at all   Total Score PHQ 2 0       Alcohol Risk Screen    Do you average more than 1 drink per night or more than 7 drinks a week:  No    On any one occasion in the past three months have you have had more than 3 drinks containing alcohol:  No        Functional Ability and Level of Safety    Hearing: Hearing is good. Activities of Daily Living: The home contains: no safety equipment. Patient does total self care      Ambulation: with no difficulty     Fall Risk:  Fall Risk Assessment, last 12 mths 12/8/2021   Able to walk? Yes   Fall in past 12 months? 0   Do you feel unsteady? 0   Are you worried about falling 0   Is TUG test greater than 12 seconds? -   Is the gait abnormal? -   Number of falls in past 12 months -   Fall with injury?  -      Abuse Screen:  Patient is not abused       Cognitive Screening    Has your family/caregiver stated any concerns about your memory: no     Cognitive Screening: not necessary    Health Maintenance Due     Health Maintenance Due   Topic Date Due    Shingrix Vaccine Age 50> (1 of 2) Never done    Bone Densitometry (Dexa) Screening Never done    Colorectal Cancer Screening Combo  12/23/2020    Medicare Yearly Exam  Never done    Flu Vaccine (1) Never done    COVID-19 Vaccine (3 - Booster for Moderna series) 09/19/2021    A1C test (Diabetic or Prediabetic)  10/23/2021       Patient Care Team   Patient Care Team:  Sindy aBrry MD as PCP - General (Internal Medicine)  Sindy Barry [facility]  Sindy Barry MD (Internal Medicine)    History     Patient Active Problem List   Diagnosis Code    Hypertension I10    Hepatitis C B19.20    Plantar fasciitis of right foot M72.2    S/P colonoscopy Z98.890    Pulmonary nodule, right R91.1    Prediabetes R73.03    H/O gastric bypass Z98.84    Renal cyst, right N28.1    Severe obesity with body mass index (BMI) of 35.0 to 39.9 with serious comorbidity ([facility] [address] E66.01    RATLIFF (dyspnea on exertion) R06.00    H/O cardiovascular stress test Z92.89    Laryngitis J04.0    Pulmonary nodules R91.8    Shoulder pain, right M25.511    Fall W19. Shira Pat Family history of ovarian cancer Z80.41    Left lower lobe pulmonary nodule R91.1     Past Medical History:   Diagnosis Date    RATLIFF (dyspnea on exertion) 06/03/2019    Encounter for Hemoccult screening 07/19/2017    neg    Fall 01/11/2021    Family history of ovarian cancer 2008    mother    H/O cardiovascular stress test 06/17/2019    LVEF equals 59%. Left ventricular wall motion and thickening is normal    H/O gastric bypass 2003    md roshni    Hepatitis C     rx ended 2008,2014 viral load non detected, syl luna md    Hypertension     Laryngitis     Left lower lobe pulmonary nodule 01/26/2021    left lower lobe there is a new part solid 4 mm    Normal cardiac stress test 6-2-06/ 6/17/19    LVEF equals 59%.  Left ventricular wall motion and thickening is normal    Obesity     Plantar fasciitis of right foot     Prediabetes     Pulmonary nodule, right 11-22-15  /  6-2-16    7mm - repeat 6 mo  stable repeat 6/2/17  - [facility] rad   no change multiple pul nodules compare to [facility] 7/17/18    Pulmonary nodules 01/09/2020    stable 0no new nodules - high risk pt f/u in 12 months    Renal cyst, right     S/P colonoscopy 2010    Shoulder pain, right     Wellness examination 01/09/2018      Past Surgical History:   Procedure Laterality Date    HX COLONOSCOPY      HX GYN       Current Outpatient Medications   Medication Sig Dispense Refill    hydroCHLOROthiazide (HYDRODIURIL) 12.5 mg tablet TAKE 1 TABLET BY MOUTH EVERY DAY 90 Tab 3    albuterol (PROVENTIL HFA, VENTOLIN HFA, PROAIR HFA) 90 mcg/actuation inhaler Take 2 Puffs by inhalation every four (4) hours as needed for Wheezing. 1 Inhaler 11    budesonide-formoterol (SYMBICORT) 160-4.5 mcg/actuation HFAA Take 2 Puffs by inhalation two (2) times a day. 1 Inhaler 11    sodium chloride (OCEAN) 0.65 % nasal squeeze bottle 0.1 mL by Both Nostrils route four (4) times daily. 45 mL 11    fluticasone propionate (FLONASE) 50 mcg/actuation nasal spray 2 Sprays by Both Nostrils route daily. 1 Bottle 11    PARoxetine (PAXIL) 10 mg tablet Take 1 Tab by mouth daily. 30 Tab 3    CYANOCOBALAMIN, VITAMIN B-12, (VITAMIN B-12 PO) Take 1 Tab by mouth daily.  multivitamin (ONE A DAY) tablet Take 1 Tab by mouth daily.  VITAMIN A PO Take 1 Tab by mouth daily. Allergies   Allergen Reactions    Codeine Hives       Family History   Problem Relation Age of Onset    Cancer Mother      Social History     Tobacco Use    Smoking status: Never Smoker    Smokeless tobacco: Never Used   Substance Use Topics    Alcohol use:  Yes     Alcohol/week: 1.7 standard drinks     Types: 2 Glasses of wine per week     Comment: occasional         Zenia Pintos, LPN

## 2021-12-09 LAB
ALBUMIN SERPL-MCNC: 4 G/DL (ref 3.5–5)
ALBUMIN/GLOB SERPL: 1.1 {RATIO} (ref 1.1–2.2)
ALP SERPL-CCNC: 102 U/L (ref 45–117)
ALT SERPL-CCNC: 23 U/L (ref 12–78)
ANION GAP SERPL CALC-SCNC: 6 MMOL/L (ref 5–15)
APPEARANCE UR: CLEAR
AST SERPL-CCNC: 25 U/L (ref 15–37)
BASOPHILS # BLD: 0 K/UL (ref 0–0.1)
BASOPHILS NFR BLD: 1 % (ref 0–1)
BILIRUB SERPL-MCNC: 0.3 MG/DL (ref 0.2–1)
BILIRUB UR QL: NEGATIVE
BUN SERPL-MCNC: 22 MG/DL (ref 6–20)
BUN/CREAT SERPL: 24 (ref 12–20)
CALCIUM SERPL-MCNC: 9.8 MG/DL (ref 8.5–10.1)
CHLORIDE SERPL-SCNC: 106 MMOL/L (ref 97–108)
CHOLEST SERPL-MCNC: 170 MG/DL
CO2 SERPL-SCNC: 28 MMOL/L (ref 21–32)
COLOR UR: ABNORMAL
CREAT SERPL-MCNC: 0.91 MG/DL (ref 0.55–1.02)
DIFFERENTIAL METHOD BLD: ABNORMAL
EOSINOPHIL # BLD: 0.2 K/UL (ref 0–0.4)
EOSINOPHIL NFR BLD: 3 % (ref 0–7)
ERYTHROCYTE [DISTWIDTH] IN BLOOD BY AUTOMATED COUNT: 14.9 % (ref 11.5–14.5)
EST. AVERAGE GLUCOSE BLD GHB EST-MCNC: 114 MG/DL
GLOBULIN SER CALC-MCNC: 3.6 G/DL (ref 2–4)
GLUCOSE SERPL-MCNC: 98 MG/DL (ref 65–100)
GLUCOSE UR STRIP.AUTO-MCNC: NEGATIVE MG/DL
HBA1C MFR BLD: 5.6 % (ref 4–5.6)
HCT VFR BLD AUTO: 42.4 % (ref 35–47)
HDLC SERPL-MCNC: 53 MG/DL
HDLC SERPL: 3.2 {RATIO} (ref 0–5)
HGB BLD-MCNC: 13.3 G/DL (ref 11.5–16)
HGB UR QL STRIP: NEGATIVE
IMM GRANULOCYTES # BLD AUTO: 0 K/UL (ref 0–0.04)
IMM GRANULOCYTES NFR BLD AUTO: 0 % (ref 0–0.5)
KETONES UR QL STRIP.AUTO: ABNORMAL MG/DL
LDLC SERPL CALC-MCNC: 96.4 MG/DL (ref 0–100)
LEUKOCYTE ESTERASE UR QL STRIP.AUTO: NEGATIVE
LYMPHOCYTES # BLD: 2.2 K/UL (ref 0.8–3.5)
LYMPHOCYTES NFR BLD: 32 % (ref 12–49)
MCH RBC QN AUTO: 25.8 PG (ref 26–34)
MCHC RBC AUTO-ENTMCNC: 31.4 G/DL (ref 30–36.5)
MCV RBC AUTO: 82.3 FL (ref 80–99)
MONOCYTES # BLD: 0.4 K/UL (ref 0–1)
MONOCYTES NFR BLD: 6 % (ref 5–13)
NEUTS SEG # BLD: 4 K/UL (ref 1.8–8)
NEUTS SEG NFR BLD: 58 % (ref 32–75)
NITRITE UR QL STRIP.AUTO: NEGATIVE
NRBC # BLD: 0 K/UL (ref 0–0.01)
NRBC BLD-RTO: 0 PER 100 WBC
PH UR STRIP: 5 [PH] (ref 5–8)
PLATELET # BLD AUTO: 244 K/UL (ref 150–400)
PMV BLD AUTO: 11.9 FL (ref 8.9–12.9)
POTASSIUM SERPL-SCNC: 4.6 MMOL/L (ref 3.5–5.1)
PROT SERPL-MCNC: 7.6 G/DL (ref 6.4–8.2)
PROT UR STRIP-MCNC: NEGATIVE MG/DL
RBC # BLD AUTO: 5.15 M/UL (ref 3.8–5.2)
SODIUM SERPL-SCNC: 140 MMOL/L (ref 136–145)
SP GR UR REFRACTOMETRY: 1.02 (ref 1–1.03)
TRIGL SERPL-MCNC: 103 MG/DL (ref ?–150)
TSH SERPL DL<=0.05 MIU/L-ACNC: 1.7 UIU/ML (ref 0.36–3.74)
UROBILINOGEN UR QL STRIP.AUTO: 0.2 EU/DL (ref 0.2–1)
VLDLC SERPL CALC-MCNC: 20.6 MG/DL
WBC # BLD AUTO: 6.9 K/UL (ref 3.6–11)

## 2022-02-25 ENCOUNTER — TRANSCRIBE ORDER (OUTPATIENT)
Dept: REGISTRATION | Age: 67
End: 2022-02-25

## 2022-02-25 ENCOUNTER — HOSPITAL ENCOUNTER (OUTPATIENT)
Dept: PREADMISSION TESTING | Age: 67
Discharge: HOME | End: 2022-02-25
Attending: INTERNAL MEDICINE
Payer: MEDICARE

## 2022-02-25 DIAGNOSIS — U07.1 COVID-19: Primary | ICD-10-CM

## 2022-02-25 DIAGNOSIS — U07.1 COVID-19: ICD-10-CM

## 2022-02-25 PROCEDURE — U0005 INFEC AGEN DETEC AMPLI PROBE: HCPCS

## 2022-02-26 LAB
SARS-COV-2, XPLCVT: NOT DETECTED
SOURCE, COVRS: NORMAL

## 2022-03-01 ENCOUNTER — ANESTHESIA (OUTPATIENT)
Dept: ENDOSCOPY | Age: 67
End: 2022-03-01
Payer: MEDICARE

## 2022-03-01 ENCOUNTER — HOSPITAL ENCOUNTER (OUTPATIENT)
Age: 67
Setting detail: OUTPATIENT SURGERY
Discharge: HOME | End: 2022-03-01
Attending: INTERNAL MEDICINE | Admitting: INTERNAL MEDICINE
Payer: MEDICARE

## 2022-03-01 ENCOUNTER — ANESTHESIA EVENT (OUTPATIENT)
Dept: ENDOSCOPY | Age: 67
End: 2022-03-01
Payer: MEDICARE

## 2022-03-01 VITALS
HEIGHT: 61 IN | TEMPERATURE: 97.1 F | OXYGEN SATURATION: 96 % | RESPIRATION RATE: 18 BRPM | SYSTOLIC BLOOD PRESSURE: 153 MMHG | BODY MASS INDEX: 36.82 KG/M2 | DIASTOLIC BLOOD PRESSURE: 80 MMHG | HEART RATE: 62 BPM | WEIGHT: 195 LBS

## 2022-03-01 PROCEDURE — 74011250637 HC RX REV CODE- 250/637: Performed by: INTERNAL MEDICINE

## 2022-03-01 PROCEDURE — 77030021593 HC FCPS BIOP ENDOSC BSC -A: Performed by: INTERNAL MEDICINE

## 2022-03-01 PROCEDURE — 76060000031 HC ANESTHESIA FIRST 0.5 HR: Performed by: INTERNAL MEDICINE

## 2022-03-01 PROCEDURE — 74011000250 HC RX REV CODE- 250: Performed by: NURSE ANESTHETIST, CERTIFIED REGISTERED

## 2022-03-01 PROCEDURE — 76040000019: Performed by: INTERNAL MEDICINE

## 2022-03-01 PROCEDURE — 74011250636 HC RX REV CODE- 250/636: Performed by: NURSE ANESTHETIST, CERTIFIED REGISTERED

## 2022-03-01 PROCEDURE — 88305 TISSUE EXAM BY PATHOLOGIST: CPT

## 2022-03-01 RX ORDER — GLYCOPYRROLATE 0.2 MG/ML
INJECTION INTRAMUSCULAR; INTRAVENOUS AS NEEDED
Status: DISCONTINUED | OUTPATIENT
Start: 2022-03-01 | End: 2022-03-01 | Stop reason: HOSPADM

## 2022-03-01 RX ORDER — FENTANYL CITRATE 50 UG/ML
200 INJECTION, SOLUTION INTRAMUSCULAR; INTRAVENOUS
Status: DISCONTINUED | OUTPATIENT
Start: 2022-03-01 | End: 2022-03-01 | Stop reason: HOSPADM

## 2022-03-01 RX ORDER — SODIUM CHLORIDE 0.9 % (FLUSH) 0.9 %
5-40 SYRINGE (ML) INJECTION AS NEEDED
Status: DISCONTINUED | OUTPATIENT
Start: 2022-03-01 | End: 2022-03-01 | Stop reason: HOSPADM

## 2022-03-01 RX ORDER — SODIUM CHLORIDE 9 MG/ML
150 INJECTION, SOLUTION INTRAVENOUS CONTINUOUS
Status: DISCONTINUED | OUTPATIENT
Start: 2022-03-01 | End: 2022-03-01 | Stop reason: HOSPADM

## 2022-03-01 RX ORDER — CHOLECALCIFEROL (VITAMIN D3) 125 MCG
5000 CAPSULE ORAL DAILY
COMMUNITY

## 2022-03-01 RX ORDER — LIDOCAINE HYDROCHLORIDE 20 MG/ML
INJECTION, SOLUTION INFILTRATION; PERINEURAL AS NEEDED
Status: DISCONTINUED | OUTPATIENT
Start: 2022-03-01 | End: 2022-03-01 | Stop reason: HOSPADM

## 2022-03-01 RX ORDER — SODIUM CHLORIDE 0.9 % (FLUSH) 0.9 %
5-40 SYRINGE (ML) INJECTION EVERY 8 HOURS
Status: DISCONTINUED | OUTPATIENT
Start: 2022-03-01 | End: 2022-03-01 | Stop reason: HOSPADM

## 2022-03-01 RX ORDER — PROPOFOL 10 MG/ML
INJECTION, EMULSION INTRAVENOUS AS NEEDED
Status: DISCONTINUED | OUTPATIENT
Start: 2022-03-01 | End: 2022-03-01 | Stop reason: HOSPADM

## 2022-03-01 RX ORDER — ATROPINE SULFATE 0.1 MG/ML
0.5 INJECTION INTRAVENOUS
Status: DISCONTINUED | OUTPATIENT
Start: 2022-03-01 | End: 2022-03-01 | Stop reason: HOSPADM

## 2022-03-01 RX ORDER — DEXTROMETHORPHAN/PSEUDOEPHED 2.5-7.5/.8
1.2 DROPS ORAL
Status: DISCONTINUED | OUTPATIENT
Start: 2022-03-01 | End: 2022-03-01 | Stop reason: HOSPADM

## 2022-03-01 RX ORDER — SODIUM CHLORIDE 9 MG/ML
INJECTION, SOLUTION INTRAVENOUS
Status: DISCONTINUED | OUTPATIENT
Start: 2022-03-01 | End: 2022-03-01 | Stop reason: HOSPADM

## 2022-03-01 RX ORDER — EPINEPHRINE 0.1 MG/ML
1 INJECTION INTRACARDIAC; INTRAVENOUS
Status: DISCONTINUED | OUTPATIENT
Start: 2022-03-01 | End: 2022-03-01 | Stop reason: HOSPADM

## 2022-03-01 RX ORDER — MIDAZOLAM HYDROCHLORIDE 1 MG/ML
.25-5 INJECTION, SOLUTION INTRAMUSCULAR; INTRAVENOUS
Status: DISCONTINUED | OUTPATIENT
Start: 2022-03-01 | End: 2022-03-01 | Stop reason: HOSPADM

## 2022-03-01 RX ADMIN — PROPOFOL 30 MG: 10 INJECTION, EMULSION INTRAVENOUS at 09:52

## 2022-03-01 RX ADMIN — PROPOFOL 70 MG: 10 INJECTION, EMULSION INTRAVENOUS at 09:50

## 2022-03-01 RX ADMIN — PROPOFOL 20 MG: 10 INJECTION, EMULSION INTRAVENOUS at 09:57

## 2022-03-01 RX ADMIN — PROPOFOL 30 MG: 10 INJECTION, EMULSION INTRAVENOUS at 09:54

## 2022-03-01 RX ADMIN — GLYCOPYRROLATE 0.2 MG: 0.2 INJECTION, SOLUTION INTRAMUSCULAR; INTRAVENOUS at 09:54

## 2022-03-01 RX ADMIN — PROPOFOL 30 MG: 10 INJECTION, EMULSION INTRAVENOUS at 10:01

## 2022-03-01 RX ADMIN — SODIUM CHLORIDE: 900 INJECTION, SOLUTION INTRAVENOUS at 09:46

## 2022-03-01 RX ADMIN — LIDOCAINE HYDROCHLORIDE 60 MG: 20 INJECTION, SOLUTION INFILTRATION; PERINEURAL at 09:50

## 2022-03-01 NOTE — ANESTHESIA PREPROCEDURE EVALUATION
Relevant Problems   RESPIRATORY SYSTEM   (+) RATLIFF (dyspnea on exertion)      CARDIOVASCULAR   (+) Hypertension      GASTROINTESTINAL   (+) Hepatitis C      RENAL FAILURE   (+) Renal cyst, right      ENDOCRINE   (+) Severe obesity with body mass index (BMI) of 35.0 to 39.9 with serious comorbidity (HCC)       Anesthetic History   No history of anesthetic complications            Review of Systems / Medical History  Patient summary reviewed, nursing notes reviewed and pertinent labs reviewed    Pulmonary  Within defined limits                 Neuro/Psych   Within defined limits           Cardiovascular    Hypertension              Exercise tolerance: >4 METS     GI/Hepatic/Renal           Liver disease     Endo/Other        Morbid obesity     Other Findings              Physical Exam    Airway  Mallampati: III  TM Distance: 4 - 6 cm  Neck ROM: normal range of motion   Mouth opening: Normal     Cardiovascular  Regular rate and rhythm,  S1 and S2 normal,  no murmur, click, rub, or gallop  Rhythm: regular  Rate: normal         Dental  No notable dental hx       Pulmonary  Breath sounds clear to auscultation               Abdominal  GI exam deferred       Other Findings            Anesthetic Plan    ASA: 3  Anesthesia type: MAC          Induction: Intravenous  Anesthetic plan and risks discussed with: Patient

## 2022-03-01 NOTE — H&P
[address]  [address], [address]          Pre-procedure History and Physical       NAME:  George Alexander   :   1955   MRN:   [id number]     CHIEF COMPLAINT/HPI: crcs    PMH:  Past Medical History:   Diagnosis Date    RATLIFF (dyspnea on exertion) 2019    Encounter for Hemoccult screening 2017    neg    Fall 2021    Family history of ovarian cancer     mother    H/O cardiovascular stress test 2019    LVEF equals 59%. Left ventricular wall motion and thickening is normal    H/O gastric bypass     md roshni    Hepatitis C     rx ended , viral load non detected, syl luna md    Hypertension     pt states she does not have htn - 3/1/22    Laryngitis     Left lower lobe pulmonary nodule 2021    left lower lobe there is a new part solid 4 mm    Normal cardiac stress test 6-2-19    LVEF equals 59%. Left ventricular wall motion and thickening is normal    Obesity     Plantar fasciitis of right foot     Prediabetes     Pulmonary nodule, right -15  /  16    7mm  - repeat 6 mo  stable repeat 17  - [facility] rad   no change multiple pul nodules [facility] to [facility]    Pulmonary nodules 2020    stable 0no new nodules - high risk pt f/u in 12 months    Renal cyst, right     S/P colonoscopy 2010    Shoulder pain, right     Wellness examination 2018       PSH:  Past Surgical History:   Procedure Laterality Date    HX COLONOSCOPY      HX GASTRIC BYPASS      HX GYN         Allergies: Allergies   Allergen Reactions    Codeine Hives       Home Medications:  Prior to Admission Medications   Prescriptions Last Dose Informant Patient Reported? Taking? CYANOCOBALAMIN, VITAMIN B-12, (VITAMIN B-12 PO) 2022  Yes No   Sig: Take 1 Tab by mouth daily. VITAMIN A PO 2022  Yes No   Sig: Take 1 Tab by mouth daily.    albuterol (PROVENTIL HFA, VENTOLIN HFA, PROAIR HFA) 90 mcg/actuation inhaler Unknown at Unknown time  No No   Sig: Take 2 Puffs by inhalation every four (4) hours as needed for Wheezing. budesonide-formoterol (SYMBICORT) 160-4.5 mcg/actuation HFAA Unknown at Unknown time  No No   Sig: Take 2 Puffs by inhalation two (2) times a day. cholecalciferol (VITAMIN D3) (5000 Units /125 mcg) capsule 2022  Yes Yes   Sig: Take 5,000 Units by mouth daily. fluticasone propionate (FLONASE) 50 mcg/actuation nasal spray Unknown at Unknown time  No No   Si Sprays by Both Nostrils route daily. hydroCHLOROthiazide (HYDRODIURIL) 12.5 mg tablet 2022  No No   Sig: TAKE 1 TABLET BY MOUTH EVERY DAY   Patient taking differently: as needed. TAKE 1 TABLET BY MOUTH AS NEEDED   multivitamin (ONE A DAY) tablet 2022  Yes No   Sig: Take 1 Tab by mouth daily. sodium chloride (OCEAN) 0.65 % nasal squeeze bottle Unknown at Unknown time  No No   Si.1 mL by Both Nostrils route four (4) times daily.       Facility-Administered Medications: None       Hospital Medications:  Current Facility-Administered Medications   Medication Dose Route Frequency    0.9% sodium chloride infusion  150 mL/hr IntraVENous CONTINUOUS    sodium chloride (NS) flush 5-40 mL  5-40 mL IntraVENous Q8H    sodium chloride (NS) flush 5-40 mL  5-40 mL IntraVENous PRN    midazolam (VERSED) injection 0.25-5 mg  0.25-5 mg IntraVENous Multiple    fentaNYL citrate (PF) injection 200 mcg  200 mcg IntraVENous Multiple    simethicone (MYLICON) 97ME/3.2DL oral drops 80 mg  1.2 mL Oral Multiple    atropine injection 0.5 mg  0.5 mg IntraVENous ONCE PRN    EPINEPHrine (ADRENALIN) 0.1 mg/mL syringe 1 mg  1 mg Endoscopically ONCE PRN       Family History:  Family History   Problem Relation Age of Onset    Cancer Mother        Social History:  Social History     Tobacco Use    Smoking status: Never Smoker    Smokeless tobacco: Never Used   Substance Use Topics    Alcohol use: Yes     Comment: occassional - 2 times/month       The patient was counseled at length about the risks of logan Covid-19 in the milla-operative and post-operative states including the recovery window of their procedure. The patient was made aware that logan Covid-19 after a surgical procedure may worsen their prognosis for recovering from the virus and lend to a higher morbidity and or mortality risk. The patient was given the options of postponing their procedure. All of the risks, benefits, and alternatives were discussed. The patient does  wish to proceed with the procedure. PHYSICAL EXAM PRIOR TO SEDATION:  General: Alert, in no acute distress    Lungs:            CTA bilaterally  Heart:  Normal S1, S2    Abdomen: Soft, Non distended, Non tender. Normoactive bowel sounds. Assessment:   Stable for sedation administration.   Date of last colonoscopy: 2010, Polyps  No    Plan:     · Endoscopic procedure with sedation     Signed By: Marbella Washington MD     3/1/2022  9:46 AM

## 2022-03-01 NOTE — PROCEDURES
Eileen Ayala [facility], [facility] [address] M.D.  [place], [address]  [phone]               Colonoscopy Procedure Note    NAME: Bob Garcia  :  1955  MRN:  [id number]    Indications:   Screening colonoscopy     : John Kingsley MD    Referring Provider:  Frieda Kelly MD    Staff: Endoscopy Conrad Odell: Kat Gooden  Endoscopy RN-1: Lyndsey Connolly RN    Prosthetic devices, grafts, tissues, transplant, or devices implanted: none    Medicines:  MAC anesthesia      Procedure Details:  After informed consent was obtained with all risks and benefits of the procedure explained and preprocedure exam completed, the patient was placed in the left lateral decubitus position. Universal protocol for patient identification was performed and documented in the nursing notes. Throughout the procedure, the patient's blood pressure was monitored at least every five minutes; pulse, and oxygen saturations were monitored continuously. All vital signs were documented in the nursing notes. A digital rectal exam was performed and was normal.  The Olympus videocolonoscope  was inserted in the rectum and carefully advanced to the cecum, which was identified by the ileocecal valve and appendiceal orifice. The colonoscope was slowly withdrawn with careful evaluation between folds. Retroflexion in the rectum was performed; findings and interventions are described below. Procedure start time, extent reached time/cecum time, and procedure end time are documented in the nursing notes. The quality of preparation was adequate. Findings:   1. 2 sessile polyps with greatest diameter of 2 mm (1 in the cecum, 1 in the sigmoid) s/p cold forceps polypectomy  2.  Small internal hemorrhoids    Interventions:    2 complete polypectomy were performed using cold biopsy forceps and the polyps were  retrieved    Specimens:   ID Type Source Tests Collected by Time Destination   1 : Cecal Polyp Preservative Cecum  Jay Choe MD 3/1/2022 3503 Pathology   2 : Sigmoid Colon Polyp Preservative Sigmoid  Jay Choe MD 3/1/2022 1001 Pathology       EBL:  None. Complications:   No immediate complications     Impression:  -See post-procedure diagnoses. Recommendations:   -If adenoma is present, repeat colonoscopy in 5-7 years. If < 10 years, reason:  above average risk patient    Resume normal medication(s). Signed by:  Vj Rose MD          3/1/2022  10:08 AM

## 2022-03-01 NOTE — ANESTHESIA POSTPROCEDURE EVALUATION
Procedure(s):  COLONOSCOPY    :-  ENDOSCOPIC POLYPECTOMY. MAC    Anesthesia Post Evaluation      Multimodal analgesia: multimodal analgesia used between 6 hours prior to anesthesia start to PACU discharge  Patient location during evaluation: PACU  Patient participation: complete - patient participated  Level of consciousness: sleepy but conscious and responsive to verbal stimuli  Pain score: 1  Pain management: adequate  Airway patency: patent  Anesthetic complications: no  Cardiovascular status: acceptable  Respiratory status: acceptable  Hydration status: acceptable  Comments: +Post-Anesthesia Evaluation and Assessment    Patient: Ludmila Alejandra MRN: [id number]  SSN: [id number]   YOB: 1955  Age: 78 y.o. Sex: female      Cardiovascular Function/Vital Signs    /78   Pulse 62   Resp 19   Ht 5' 1\" (1.549 m)   Wt 88.5 kg (195 lb)   SpO2 100%   Breastfeeding No   BMI 36.84 kg/m²     Patient is status post Procedure(s):  COLONOSCOPY    :-  ENDOSCOPIC POLYPECTOMY. Nausea/Vomiting: Controlled. Postoperative hydration reviewed and adequate. Pain:  Pain Scale 1: Numeric (0 - 10) (03/01/22 0936)  Pain Intensity 1: 0 (03/01/22 0936)   Managed. Neurological Status: At baseline. Mental Status and Level of Consciousness: Arousable. Pulmonary Status:   O2 Device: Nasal cannula (03/01/22 1005)   Adequate oxygenation and airway patent. Complications related to anesthesia: None    Post-anesthesia assessment completed. No concerns. Signed By: Arlyn Yusuf MD    3/1/2022  Post anesthesia nausea and vomiting:  controlled  Final Post Anesthesia Temperature Assessment:  Normothermia (36.0-37.5 degrees C)      INITIAL Post-op Vital signs:   Vitals Value Taken Time   BP     Temp     Pulse 59 03/01/22 1011   Resp 11 03/01/22 1011   SpO2 98 % 03/01/22 1011   Vitals shown include unvalidated device data.

## 2022-03-01 NOTE — PROGRESS NOTES
[place]  1955  [id number]    Situation:  Verbal report received from: Donna Tipton, procedural RN  Procedure: Procedure(s):  COLONOSCOPY    :-  ENDOSCOPIC POLYPECTOMY    Background:    Preoperative diagnosis: Colon cancer screening [Z12.11]  Postoperative diagnosis: Colon Polyps  Internal Hemorrhoids    :  Dr. Cecy Garcia  Assistant(s): Endoscopy Technician-1: Nohemy Oakley  Endoscopy RN-1: Jemma Rivera RN    Specimens:   ID Type Source Tests Collected by Time Destination   1 : Cecal Polyp Preservative Cecum  Natacha Beltran MD 3/1/2022 6419 Pathology   2 : Sigmoid Colon Polyp Preservative Sigmoid  Natacha Beltran MD 3/1/2022 1001 Pathology     H. Pylori  no    Anesthesia gave intra-procedure sedation and medications, see anesthesia flow sheet yes    Intravenous fluids: NS@ KVO     Vital signs stable     Abdominal assessment: round and soft     Recommendation:  Discharge patient per MD order.   Family : son in car  Permission to share finding with family or friend yes

## 2022-03-01 NOTE — DISCHARGE INSTRUCTIONS
[facility]en [facility] [address] M.D.  [address] [address] [place]   [phone]          COLONOSCOPY DISCHARGE INSTRUCTIONS    Tamra King  [id number]  1955    DISCOMFORT:  Redness at IV site- apply warm compress to area; if redness or soreness persist- contact your physician  There may be a slight amount of blood passed from the rectum  Gaseous discomfort- walking, belching will help relieve any discomfort  You may not operate a vehicle for 12 hours  You may not engage in an occupation involving machinery or appliances for the  rest of today  You may not drink alcoholic beverages for at least 12 hours  Avoid making any critical decisions for at least 24 hours    DIET:   You may resume your normal diet, but some patients find that heavy or large  meals may lead to indigestion or vomiting. I suggest a light meal as first food  intake. I recommend a whole food, plant-based diet for your overall health. ACTIVITY:  You may resume your normal daily activities. It is recommended that you spend the remainder of the day resting - avoid any strenuous activity. CALL M.DSandy IF ANY SIGN OF:   Increasing pain, nausea, vomiting  Abdominal distension (swelling)  Significant bleeding (oral or rectal)  Fever   Pain in chest area  Shortness of breath    Additional Instructions:   Call Dr. Jeanie [place] if any questions or problems at [phone]   You should receive the biopsy results by phone or mail within 3 weeks, if not, call  my office for the results      Should have a repeat colonoscopy in 5-7 years based on pathology. Colonoscopy showed 2 small polyps removed and small internal hemorrhoids. Learning About Coronavirus [phone])  Coronavirus [phone]): Overview  What is coronavirus (COVID-19)? The coronavirus disease (COVID-19) is caused by a virus. It is an illness that was first found in [place], [place], in December 2019. It has since spread worldwide.   The virus can cause fever, cough, and trouble breathing. In severe cases, it can cause pneumonia and make it hard to breathe without help. It can cause death. Coronaviruses are a large group of viruses. They cause the common cold. They also cause more serious illnesses like Middle East respiratory syndrome (MERS) and severe acute respiratory syndrome (SARS). COVID-19 is caused by a novel coronavirus. That means it's a new type that has not been seen in people before. This virus spreads person-to-person through droplets from coughing and sneezing. It can also spread when you are close to someone who is infected. And it can spread when you touch something that has the virus on it, such as a doorknob or a tabletop. What can you do to protect yourself from coronavirus (COVID-19)? The best way to protect yourself from getting sick is to:  · Avoid areas where there is an outbreak. · Avoid contact with people who may be infected. · Wash your hands often with soap or alcohol-based hand sanitizers. · Avoid crowds and try to stay at least 6 feet away from other people. · Wash your hands often, especially after you cough or sneeze. Use soap and water, and scrub for at least 20 seconds. If soap and water aren't available, use an alcohol-based hand . · Avoid touching your mouth, nose, and eyes. What can you do to avoid spreading the virus to others? To help avoid spreading the virus to others:  · Cover your mouth with a tissue when you cough or sneeze. Then throw the tissue in the trash. · Use a disinfectant to clean things that you touch often. · Stay home if you are sick or have been exposed to the virus. Don't go to school, work, or public areas. And don't use public transportation. · If you are sick:  ? Leave your home only if you need to get medical care. But call the doctor's office first so they know you're coming.  And wear a face mask, if you have one.  ? If you have a face mask, wear it whenever you're around other people. It can help stop the spread of the virus when you cough or sneeze. ? Clean and disinfect your home every day. Use household  and disinfectant wipes or sprays. Take special care to clean things that you grab with your hands. These include doorknobs, remote controls, phones, and handles on your refrigerator and microwave. And don't forget countertops, tabletops, bathrooms, and computer keyboards. When to call for help  Call 911 anytime you think you may need emergency care. For example, call if:  · You have severe trouble breathing. (You can't talk at all.)  · You have constant chest pain or pressure. · You are severely dizzy or lightheaded. · You are confused or can't think clearly. · Your face and lips have a blue color. · You pass out (lose consciousness) or are very hard to wake up. Call your doctor now if you develop symptoms such as:  · Shortness of breath. · Fever. · Cough. If you need to get care, call ahead to the doctor's office for instructions before you go. Make sure you wear a face mask, if you have one, to prevent exposing other people to the virus. Where can you get the latest information? The following health organizations are tracking and studying this virus. Their websites contain the most up-to-date information. Iesha Perez also learn what to do if you think you may have been exposed to the virus. · U.S. Centers for Disease Control and Prevention (CDC): The CDC provides updated news about the disease and travel advice. The website also tells you how to prevent the spread of infection. www.cdc.gov  · World Health Organization [facility]): WHO offers information about the virus outbreaks. WHO also has travel advice. www.who.int  Current as of: April 1, 2020               Content Version: 12.4  © 3748-6989 [employer].    Care instructions adapted under license by your healthcare professional. If you have questions about a medical condition or this instruction, always ask your healthcare professional. [place] [address] any warranty or liability for your use of this information. Impaired gait/Poor balance/Weakness

## 2022-03-18 PROBLEM — R06.09 DOE (DYSPNEA ON EXERTION): Status: ACTIVE | Noted: 2019-06-03

## 2022-03-18 PROBLEM — R91.8 PULMONARY NODULES: Status: ACTIVE | Noted: 2020-01-09

## 2022-03-19 PROBLEM — R91.1 LEFT LOWER LOBE PULMONARY NODULE: Status: ACTIVE | Noted: 2021-01-26

## 2022-03-19 PROBLEM — E66.01 SEVERE OBESITY WITH BODY MASS INDEX (BMI) OF 35.0 TO 39.9 WITH SERIOUS COMORBIDITY (HCC): Status: ACTIVE | Noted: 2018-07-10

## 2022-03-20 PROBLEM — Z92.89 H/O CARDIOVASCULAR STRESS TEST: Status: ACTIVE | Noted: 2019-06-17

## 2022-05-05 RX ORDER — HYDROCHLOROTHIAZIDE 12.5 MG/1
12.5 TABLET ORAL DAILY
Qty: 90 TABLET | Refills: 3 | Status: SHIPPED | OUTPATIENT
Start: 2022-05-05

## 2022-09-14 ENCOUNTER — OFFICE VISIT (OUTPATIENT)
Dept: INTERNAL MEDICINE CLINIC | Age: 67
End: 2022-09-14
Payer: MEDICARE

## 2022-09-14 VITALS
SYSTOLIC BLOOD PRESSURE: 118 MMHG | HEIGHT: 61 IN | TEMPERATURE: 97.9 F | HEART RATE: 58 BPM | WEIGHT: 201.7 LBS | RESPIRATION RATE: 20 BRPM | OXYGEN SATURATION: 100 % | DIASTOLIC BLOOD PRESSURE: 75 MMHG | BODY MASS INDEX: 38.08 KG/M2

## 2022-09-14 DIAGNOSIS — E66.01 SEVERE OBESITY (BMI 35.0-39.9) WITH COMORBIDITY (HCC): ICD-10-CM

## 2022-09-14 DIAGNOSIS — R73.02 IGT (IMPAIRED GLUCOSE TOLERANCE): ICD-10-CM

## 2022-09-14 DIAGNOSIS — E66.01 SEVERE OBESITY WITH BODY MASS INDEX (BMI) OF 35.0 TO 39.9 WITH SERIOUS COMORBIDITY (HCC): ICD-10-CM

## 2022-09-14 DIAGNOSIS — R91.1 LEFT LOWER LOBE PULMONARY NODULE: ICD-10-CM

## 2022-09-14 DIAGNOSIS — B18.2 CHRONIC HEPATITIS C WITHOUT HEPATIC COMA (HCC): ICD-10-CM

## 2022-09-14 DIAGNOSIS — I10 PRIMARY HYPERTENSION: Primary | ICD-10-CM

## 2022-09-14 PROCEDURE — 99214 OFFICE O/P EST MOD 30 MIN: CPT | Performed by: INTERNAL MEDICINE

## 2022-09-14 PROCEDURE — 3017F COLORECTAL CA SCREEN DOC REV: CPT | Performed by: INTERNAL MEDICINE

## 2022-09-14 PROCEDURE — G9899 SCRN MAM PERF RSLTS DOC: HCPCS | Performed by: INTERNAL MEDICINE

## 2022-09-14 PROCEDURE — G8510 SCR DEP NEG, NO PLAN REQD: HCPCS | Performed by: INTERNAL MEDICINE

## 2022-09-14 PROCEDURE — G8400 PT W/DXA NO RESULTS DOC: HCPCS | Performed by: INTERNAL MEDICINE

## 2022-09-14 PROCEDURE — 1101F PT FALLS ASSESS-DOCD LE1/YR: CPT | Performed by: INTERNAL MEDICINE

## 2022-09-14 PROCEDURE — G8536 NO DOC ELDER MAL SCRN: HCPCS | Performed by: INTERNAL MEDICINE

## 2022-09-14 PROCEDURE — G8427 DOCREV CUR MEDS BY ELIG CLIN: HCPCS | Performed by: INTERNAL MEDICINE

## 2022-09-14 PROCEDURE — G8754 DIAS BP LESS 90: HCPCS | Performed by: INTERNAL MEDICINE

## 2022-09-14 PROCEDURE — 1123F ACP DISCUSS/DSCN MKR DOCD: CPT | Performed by: INTERNAL MEDICINE

## 2022-09-14 PROCEDURE — G8417 CALC BMI ABV UP PARAM F/U: HCPCS | Performed by: INTERNAL MEDICINE

## 2022-09-14 PROCEDURE — G8752 SYS BP LESS 140: HCPCS | Performed by: INTERNAL MEDICINE

## 2022-09-14 PROCEDURE — 1090F PRES/ABSN URINE INCON ASSESS: CPT | Performed by: INTERNAL MEDICINE

## 2022-09-14 RX ORDER — SEMAGLUTIDE 0.25 MG/.5ML
0.25 INJECTION, SOLUTION SUBCUTANEOUS
Qty: 4 EACH | Refills: 0 | Status: SHIPPED | OUTPATIENT
Start: 2022-09-14

## 2022-09-14 NOTE — PROGRESS NOTES
Pat Moore is a 78 y.o. female    Chief Complaint   Patient presents with    Hip Pain    Back Pain     1. Have you been to the ER, urgent care clinic since your last visit? Hospitalized since your last visit? No    2. Have you seen or consulted any other health care providers outside of the [address] [place] [address] since your last visit? Include any pap smears or colon screening.  No

## 2022-09-29 ENCOUNTER — TRANSCRIBE ORDER (OUTPATIENT)
Dept: SCHEDULING | Age: 67
End: 2022-09-29

## 2022-09-29 DIAGNOSIS — Z12.31 SCREENING MAMMOGRAM FOR HIGH-RISK PATIENT: Primary | ICD-10-CM

## 2022-09-30 PROBLEM — M81.0 OSTEOPOROSIS: Status: ACTIVE | Noted: 2022-06-06

## 2022-10-12 ENCOUNTER — HOSPITAL ENCOUNTER (OUTPATIENT)
Dept: MAMMOGRAPHY | Age: 67
Discharge: HOME | End: 2022-10-12
Attending: INTERNAL MEDICINE
Payer: MEDICARE

## 2022-10-12 DIAGNOSIS — Z12.31 SCREENING MAMMOGRAM FOR HIGH-RISK PATIENT: ICD-10-CM

## 2022-10-12 PROCEDURE — 77063 BREAST TOMOSYNTHESIS BI: CPT

## 2023-01-04 ENCOUNTER — OFFICE VISIT (OUTPATIENT)
Dept: INTERNAL MEDICINE CLINIC | Age: 68
End: 2023-01-04
Payer: MEDICARE

## 2023-01-04 VITALS
TEMPERATURE: 98.1 F | BODY MASS INDEX: 37 KG/M2 | OXYGEN SATURATION: 99 % | DIASTOLIC BLOOD PRESSURE: 92 MMHG | WEIGHT: 196 LBS | RESPIRATION RATE: 20 BRPM | HEART RATE: 56 BPM | HEIGHT: 61 IN | SYSTOLIC BLOOD PRESSURE: 152 MMHG

## 2023-01-04 DIAGNOSIS — Z13.39 SCREENING FOR ALCOHOLISM: ICD-10-CM

## 2023-01-04 DIAGNOSIS — I10 PRIMARY HYPERTENSION: ICD-10-CM

## 2023-01-04 DIAGNOSIS — R91.8 PULMONARY NODULES: ICD-10-CM

## 2023-01-04 DIAGNOSIS — E78.5 DYSLIPIDEMIA: ICD-10-CM

## 2023-01-04 DIAGNOSIS — R73.02 IGT (IMPAIRED GLUCOSE TOLERANCE): ICD-10-CM

## 2023-01-04 DIAGNOSIS — M81.0 AGE-RELATED OSTEOPOROSIS WITHOUT CURRENT PATHOLOGICAL FRACTURE: ICD-10-CM

## 2023-01-04 DIAGNOSIS — E66.01 SEVERE OBESITY WITH BODY MASS INDEX (BMI) OF 35.0 TO 39.9 WITH SERIOUS COMORBIDITY (HCC): ICD-10-CM

## 2023-01-04 DIAGNOSIS — B18.2 CHRONIC HEPATITIS C WITHOUT HEPATIC COMA (HCC): ICD-10-CM

## 2023-01-04 DIAGNOSIS — Z00.00 MEDICARE ANNUAL WELLNESS VISIT, SUBSEQUENT: Primary | ICD-10-CM

## 2023-01-04 PROCEDURE — 1101F PT FALLS ASSESS-DOCD LE1/YR: CPT | Performed by: INTERNAL MEDICINE

## 2023-01-04 PROCEDURE — G8427 DOCREV CUR MEDS BY ELIG CLIN: HCPCS | Performed by: INTERNAL MEDICINE

## 2023-01-04 PROCEDURE — 3017F COLORECTAL CA SCREEN DOC REV: CPT | Performed by: INTERNAL MEDICINE

## 2023-01-04 PROCEDURE — G0439 PPPS, SUBSEQ VISIT: HCPCS | Performed by: INTERNAL MEDICINE

## 2023-01-04 PROCEDURE — 3080F DIAST BP >= 90 MM HG: CPT | Performed by: INTERNAL MEDICINE

## 2023-01-04 PROCEDURE — 1090F PRES/ABSN URINE INCON ASSESS: CPT | Performed by: INTERNAL MEDICINE

## 2023-01-04 PROCEDURE — 1123F ACP DISCUSS/DSCN MKR DOCD: CPT | Performed by: INTERNAL MEDICINE

## 2023-01-04 PROCEDURE — 3077F SYST BP >= 140 MM HG: CPT | Performed by: INTERNAL MEDICINE

## 2023-01-04 PROCEDURE — G9899 SCRN MAM PERF RSLTS DOC: HCPCS | Performed by: INTERNAL MEDICINE

## 2023-01-04 PROCEDURE — 99213 OFFICE O/P EST LOW 20 MIN: CPT | Performed by: INTERNAL MEDICINE

## 2023-01-04 PROCEDURE — G8417 CALC BMI ABV UP PARAM F/U: HCPCS | Performed by: INTERNAL MEDICINE

## 2023-01-04 PROCEDURE — G8510 SCR DEP NEG, NO PLAN REQD: HCPCS | Performed by: INTERNAL MEDICINE

## 2023-01-04 PROCEDURE — G8536 NO DOC ELDER MAL SCRN: HCPCS | Performed by: INTERNAL MEDICINE

## 2023-01-04 RX ORDER — SEMAGLUTIDE 0.25 MG/.5ML
0.25 INJECTION, SOLUTION SUBCUTANEOUS
Qty: 4 EACH | Refills: 0 | Status: SHIPPED | OUTPATIENT
Start: 2023-01-04

## 2023-01-04 NOTE — PROGRESS NOTES
This is the Subsequent Medicare Annual Wellness Exam, performed 12 months or more after the Initial AWV or the last Subsequent AWV    I have reviewed the patient's medical history in detail and updated the computerized patient record. Assessment/Plan   Education and counseling provided:  Are appropriate based on today's review and evaluation      Depression Risk Factor Screening     3 most recent PHQ Screens 1/4/2023   Little interest or pleasure in doing things Not at all   Feeling down, depressed, irritable, or hopeless Not at all   Total Score PHQ 2 0   Trouble falling or staying asleep, or sleeping too much -   Feeling tired or having little energy -   Poor appetite, weight loss, or overeating -   Feeling bad about yourself - or that you are a failure or have let yourself or your family down -   Trouble concentrating on things such as school, work, reading, or watching TV -   Moving or speaking so slowly that other people could have noticed; or the opposite being so fidgety that others notice -   Thoughts of being better off dead, or hurting yourself in some way -   PHQ 9 Score -   How difficult have these problems made it for you to do your work, take care of your home and get along with others -       Alcohol & Drug Abuse Risk Screen    Do you average more than 1 drink per night or more than 7 drinks a week:  No    On any one occasion in the past three months have you have had more than 3 drinks containing alcohol:  No          Functional Ability and Level of Safety    Hearing: Hearing is good. Activities of Daily Living: The home contains: no safety equipment. Patient does total self care      Ambulation: with no difficulty     Fall Risk:  Fall Risk Assessment, last 12 mths 1/4/2023   Able to walk? Yes   Fall in past 12 months? 0   Do you feel unsteady? 0   Are you worried about falling 0   Is TUG test greater than 12 seconds?  -   Is the gait abnormal? -   Number of falls in past 12 months - Fall with injury? -      Abuse Screen:  Patient is not abused       Cognitive Screening    Has your family/caregiver stated any concerns about your memory: no     Cognitive Screening: Normal - Verbal Fluency Test    Health Maintenance Due     Health Maintenance Due   Topic Date Due    Pneumococcal 65+ years (1 - PCV) Never done    Hepatitis B Vaccine (1 of 3 - Risk 3-dose series) Never done    Shingles Vaccine (1 of 2) Never done    COVID-19 Vaccine (4 - Booster for Jackquline Filiberto series) 01/11/2022    Medicare Yearly Exam  12/09/2022       Patient Care Team   Patient Care Team:  Kyle Tom MD as PCP - General (Internal Medicine Physician)  Kyle Tom MD as PCP - [facility] Provider  Kyle Tom MD (Internal Medicine Physician)    History     Patient Active Problem List   Diagnosis Code    Hypertension I10    Hepatitis C B19.20    Plantar fasciitis of right foot M72.2    S/P colonoscopy Z98.890    Pulmonary nodule, right R91.1    IGT (impaired glucose tolerance) R73.02    H/O gastric bypass Z98.84    Renal cyst, right N28.1    Severe obesity with body mass index (BMI) of 35.0 to 39.9 with serious comorbidity (HCC) E66.01    RATLIFF (dyspnea on exertion) R06.09    H/O cardiovascular stress test Z92.89    Laryngitis J04.0    Pulmonary nodules R91.8    Shoulder pain, right M25.511    Fall W19. [place]    Family history of ovarian cancer Z80.41    Left lower lobe pulmonary nodule R91.1    Osteoporosis M81.0     Past Medical History:   Diagnosis Date    RATLIFF (dyspnea on exertion) 06/03/2019    Encounter for Hemoccult screening 07/19/2017    neg    Fall 01/11/2021    Family history of ovarian cancer 2008    mother    Fracture, thoracic vertebra ([address] 06/06/2022    bone density - t,t8,t    H/O cardiovascular stress test 06/17/2019    LVEF equals 59%.  Left ventricular wall motion and thickening is normal    H/O gastric bypass 2003    md roshni    Hepatitis C     rx ended 2785,5101 viral load non detected, syl luna md    Hypertension     pt states she does not have htn - 3/1/22    Laryngitis     Left lower lobe pulmonary nodule 01/26/2021    left lower lobe there is a new part solid 4 mm    Normal cardiac stress test 6-2-06/ 6/17/19    LVEF equals 59%. Left ventricular wall motion and thickening is normal    Obesity     Osteoporosis 06/06/2022    [address] notes -clinical osteoporosis compression T6, T8, T9 managed by Janki Gallardo MD repeat DEXA 2 years    Plantar fasciitis of right foot     Prediabetes     Pulmonary nodule, right 11-22-15  /  6-2-16    7mm  - repeat 6 mo  stable repeat 6/2/17  - [facility] rad   no change multiple pul nodules compare to [facility] 7/17/18    Pulmonary nodules 01/09/2020    stable 0no new nodules - high risk pt f/u in 12 months    Renal cyst, right     S/P colonoscopy 2010    S/P colonoscopy 03/01/2022    Felisa Fitch MD 5-7 yrs    Shoulder pain, right     Wellness examination 01/09/2018      Past Surgical History:   Procedure Laterality Date    COLONOSCOPY N/A 3/1/2022    COLONOSCOPY    :- performed by Alexus Santo MD at [facility] ENDOSCOPY    HX COLONOSCOPY      HX GASTRIC BYPASS  2003    HX GYN       Current Outpatient Medications   Medication Sig Dispense Refill    hydroCHLOROthiazide (HYDRODIURIL) 12.5 mg tablet Take 1 Tablet by mouth daily. 90 Tablet 3    cholecalciferol (VITAMIN D3) (5000 Units /125 mcg) capsule Take 5,000 Units by mouth daily. CYANOCOBALAMIN, VITAMIN B-12, (VITAMIN B-12 PO) Take 1 Tab by mouth daily. multivitamin (ONE A DAY) tablet Take 1 Tab by mouth daily. VITAMIN A PO Take 1 Tab by mouth daily. nirmatrelvir-ritonavir (Paxlovid, EUA,) 300 mg (150 mg x 2)-100 mg Take as directed do not take Flonase or Symbicort while taking this medication (Patient not taking: Reported on 1/4/2023) 1 Box 0    semaglutide, weight loss, (Wegovy) 0.25 mg/0.5 mL pnij 0.25 mg by SubCUTAneous route every seven (7) days.  (Patient not taking: Reported on 1/4/2023) 4 Each 0    albuterol (PROVENTIL HFA, VENTOLIN HFA, PROAIR HFA) 90 mcg/actuation inhaler Take 2 Puffs by inhalation every four (4) hours as needed for Wheezing. (Patient not taking: Reported on 1/4/2023) 1 Inhaler 11    budesonide-formoterol (SYMBICORT) 160-4.5 mcg/actuation HFAA Take 2 Puffs by inhalation two (2) times a day. (Patient not taking: Reported on 1/4/2023) 1 Inhaler 11    sodium chloride (OCEAN) 0.65 % nasal squeeze bottle 0.1 mL by Both Nostrils route four (4) times daily. (Patient not taking: Reported on 1/4/2023) 45 mL 11    fluticasone propionate (FLONASE) 50 mcg/actuation nasal spray 2 Sprays by Both Nostrils route daily.  (Patient not taking: Reported on 1/4/2023) 1 Bottle 11     Allergies   Allergen Reactions    Codeine Hives       Family History   Problem Relation Age of Onset    Cancer Mother      Social History     Tobacco Use    Smoking status: Never    Smokeless tobacco: Never   Substance Use Topics    Alcohol use: Yes     Comment: occassional - 2 times/month         Charles Norman LPN

## 2023-01-04 NOTE — PATIENT INSTRUCTIONS
Medicare Wellness Visit, Female     The best way to live healthy is to have a lifestyle where you eat a well-balanced diet, exercise regularly, limit alcohol use, and quit all forms of tobacco/nicotine, if applicable. Regular preventive services are another way to keep healthy. Preventive services (vaccines, screening tests, monitoring & exams) can help personalize your care plan, which helps you manage your own care. Screening tests can find health problems at the earliest stages, when they are easiest to treat. [facility] follows the current, evidence-based guidelines published by the [facility] Franck Smith ([facility]) when recommending preventive services for our patients. Because we follow these guidelines, sometimes recommendations change over time as research supports it. (For example, mammograms used to be recommended annually. Even though Medicare will still pay for an annual mammogram, the newer guidelines recommend a mammogram every two years for women of average risk). Of course, you and your doctor may decide to screen more often for some diseases, based on your risk and your co-morbidities (chronic disease you are already diagnosed with). Preventive services for you include:  - Medicare offers their members a free annual wellness visit, which is time for you and your primary care provider to discuss and plan for your preventive service needs.  Take advantage of this benefit every year!    -Over the age of 72 should receive the recommended pneumonia vaccines.    -All adults should have a flu vaccine yearly.  -All adults should have a tetanus vaccine every 10 years.   -Over the age 48 should receive the shingles vaccines.        -All adults should be screened once for Hepatitis C.  -All adults age 38-68 who are overweight should have a diabetes screening test once every three years.   -Other screening tests and preventive services for persons with diabetes include: an eye exam to screen for diabetic retinopathy, a kidney function test, a foot exam, and stricter control over your cholesterol.   -Cardiovascular screening for adults with routine risk involves an electrocardiogram (ECG) at intervals determined by your doctor.     -Colorectal cancer screenings should be done for adults age 39-70 with no increased risk factors for colorectal cancer. There are a number of acceptable methods of screening for this type of cancer. Each test has its own benefits and drawbacks. Discuss with your doctor what is most appropriate for you during your annual wellness visit. The different tests include: colonoscopy (considered the best screening method), a fecal occult blood test, a fecal DNA test, and sigmoidoscopy.    -Lung cancer screening is recommended annually with a low dose CT scan for adults between age 54 and 68, who have smoked at least 30 pack years (equivalent of 1 pack per day for 30 days), and who is a current smoker or quit less than 15 years ago.    -A bone mass density test is recommended when a woman turns 65 to screen for osteoporosis. This test is only recommended one time, as a screening. Some providers will use this same test as a disease monitoring tool if you already have osteoporosis. -Breast cancer screenings are recommended every other year for women of normal risk, age 54-69.    -Cervical cancer screenings for women over age 72 are only recommended with certain risk factors.      Here is a list of your current Health Maintenance items (your personalized list of preventive services) with a due date:  Health Maintenance Due   Topic Date Due    Pneumococcal Vaccine (1 - PCV) Never done    Hepatitis B Vaccine (1 of 3 - Risk 3-dose series) Never done    Shingles Vaccine (1 of 2) Never done    COVID-19 Vaccine (4 - Booster for Moderna series) 01/11/2022

## 2023-01-04 NOTE — PROGRESS NOTES
Chief Complaint   Patient presents with    Hypertension    Annual Wellness Visit     1. Have you been to the ER, urgent care clinic since your last visit? Hospitalized since your last visit? No    2. Have you seen or consulted any other health care providers outside of the [address] [place] [address] since your last visit? Include any pap smears or colon screening.  No

## 2023-02-07 ENCOUNTER — HOSPITAL ENCOUNTER (OUTPATIENT)
Dept: CT IMAGING | Age: 68
Discharge: HOME | End: 2023-02-07
Attending: INTERNAL MEDICINE
Payer: MEDICARE

## 2023-02-07 DIAGNOSIS — R91.8 PULMONARY NODULES: ICD-10-CM

## 2023-02-07 PROCEDURE — 71250 CT THORAX DX C-: CPT

## 2023-02-15 ENCOUNTER — OFFICE VISIT (OUTPATIENT)
Dept: ENDOCRINOLOGY | Age: 68
End: 2023-02-15
Payer: MEDICARE

## 2023-02-15 VITALS
DIASTOLIC BLOOD PRESSURE: 73 MMHG | OXYGEN SATURATION: 98 % | HEIGHT: 61 IN | RESPIRATION RATE: 16 BRPM | SYSTOLIC BLOOD PRESSURE: 110 MMHG | WEIGHT: 193 LBS | BODY MASS INDEX: 36.44 KG/M2 | HEART RATE: 67 BPM

## 2023-02-15 DIAGNOSIS — M80.00XD OSTEOPOROSIS WITH CURRENT PATHOLOGICAL FRACTURE WITH ROUTINE HEALING, UNSPECIFIED OSTEOPOROSIS TYPE, SUBSEQUENT ENCOUNTER: Primary | ICD-10-CM

## 2023-02-15 PROCEDURE — G8536 NO DOC ELDER MAL SCRN: HCPCS | Performed by: INTERNAL MEDICINE

## 2023-02-15 PROCEDURE — G8427 DOCREV CUR MEDS BY ELIG CLIN: HCPCS | Performed by: INTERNAL MEDICINE

## 2023-02-15 PROCEDURE — 3074F SYST BP LT 130 MM HG: CPT | Performed by: INTERNAL MEDICINE

## 2023-02-15 PROCEDURE — G9899 SCRN MAM PERF RSLTS DOC: HCPCS | Performed by: INTERNAL MEDICINE

## 2023-02-15 PROCEDURE — 3017F COLORECTAL CA SCREEN DOC REV: CPT | Performed by: INTERNAL MEDICINE

## 2023-02-15 PROCEDURE — 1090F PRES/ABSN URINE INCON ASSESS: CPT | Performed by: INTERNAL MEDICINE

## 2023-02-15 PROCEDURE — 3078F DIAST BP <80 MM HG: CPT | Performed by: INTERNAL MEDICINE

## 2023-02-15 PROCEDURE — G8432 DEP SCR NOT DOC, RNG: HCPCS | Performed by: INTERNAL MEDICINE

## 2023-02-15 PROCEDURE — 99204 OFFICE O/P NEW MOD 45 MIN: CPT | Performed by: INTERNAL MEDICINE

## 2023-02-15 PROCEDURE — 1123F ACP DISCUSS/DSCN MKR DOCD: CPT | Performed by: INTERNAL MEDICINE

## 2023-02-15 PROCEDURE — 1101F PT FALLS ASSESS-DOCD LE1/YR: CPT | Performed by: INTERNAL MEDICINE

## 2023-02-15 PROCEDURE — G8417 CALC BMI ABV UP PARAM F/U: HCPCS | Performed by: INTERNAL MEDICINE

## 2023-02-15 NOTE — PROGRESS NOTES
Chief Complaint   Patient presents with    New Patient    Fracture     History of Present Illness: Elton Killian is a 75 y.o. female with a past medical history significant for thoracic vertebral fractures, gastric bypass, hepatitis C seen in referral from Skyla Cornejo MD for discussion related to osteoporosis management. Taco Mack reports that she was incidentally found to have vertebral fractures in her thoracic vertebra (T6/8/9), reports that she did not experience pain related to the fractures. Does endorse a degree of kyphosis that is disappointing to her. Not aware of any other fractures. Does have a right mandibular bridge and does not think she needs to have any extractions or implantations in the near future. Was begun on 5000 international units daily of vitamin D by OB/GYN and has been taking this consistently along with daily multivitamin which contains somewhere around 800 international units daily. Past Medical History:   Diagnosis Date    COVID-19 virus infection 11/24/2022    RATLIFF (dyspnea on exertion) 06/03/2019    Encounter for Hemoccult screening 07/19/2017    neg    Fall 01/11/2021    Family history of ovarian cancer 2008    mother    Fracture, thoracic vertebra ([address] 06/06/2022    bone density - t,t8,t    H/O cardiovascular stress test 06/17/2019    LVEF equals 59%. Left ventricular wall motion and thickening is normal    H/O gastric bypass 2003    md roshni    Hepatitis C     rx ended 2008,2014 viral load non detected, syl luna md    Hypertension     pt states she does not have htn - 3/1/22    Laryngitis     Left lower lobe pulmonary nodule 01/26/2021    left lower lobe there is a new part solid 4 mm    Normal cardiac stress test 6-2-06/ 6/17/19    LVEF equals 59%.  Left ventricular wall motion and thickening is normal    Obesity     Osteoporosis 06/06/2022    [facility] notes -clinical osteoporosis compression T6, T8, T9 managed by Nicole Dover MD repeat DEXA 2 years    Plantar fasciitis of right foot     Prediabetes     Pulmonary nodule, right 11-22-15  /  6-2-16    7mm  - repeat 6 mo  stable repeat 6/2/17  - [facility] rad   no change multiple pul nodules compare to [facility] 7/17/18    Pulmonary nodules 01/09/2020    stable 0no new nodules - high risk pt f/u in 12 months    Renal cyst, right     S/P colonoscopy 2010    S/P colonoscopy 03/01/2022    Leeanna Argueta MD 5-7 yrs    Shoulder pain, right     Wellness examination 01/09/2018     Past Surgical History:   Procedure Laterality Date    COLONOSCOPY N/A 3/1/2022    COLONOSCOPY    :- [facility] by Raffi Fournier MD at [facility] ENDOSCOPY    HX COLONOSCOPY      HX GASTRIC BYPASS  2003    HX GYN       Current Outpatient Medications   Medication Sig    hydroCHLOROthiazide (HYDRODIURIL) 12.5 mg tablet Take 1 Tablet by mouth daily. (Patient taking differently: Take 12.5 mg by mouth as needed.)    cholecalciferol (VITAMIN D3) (5000 Units /125 mcg) capsule Take 5,000 Units by mouth daily. CYANOCOBALAMIN, VITAMIN B-12, (VITAMIN B-12 PO) Take 1 Tab by mouth daily. multivitamin (ONE A DAY) tablet Take 1 Tab by mouth daily. VITAMIN A PO Take 1 Tab by mouth daily. semaglutide, weight loss, (Wegovy) 0.25 mg/0.5 mL pnij 0.25 mg by SubCUTAneous route every seven (7) days. (Patient not taking: Reported on 2/15/2023)     No current facility-administered medications for this visit.      Allergies   Allergen Reactions    Codeine Hives     Family History   Problem Relation Age of Onset    Cancer Mother        Social Hx: Former teacher, retired    Review of Systems:  See HPI    Physical Examination:  Visit Vitals  Ht 5' 1\" (1.549 m)   Wt 193 lb (87.5 kg)   BMI 36.47 kg/m²       - GENERAL: NCAT, Appears well nourished   - EYES: EOMI, non-icteric, no proptosis   - Ear/Nose/Throat: NCAT, no visible inflammation or masses   - CARDIOVASCULAR: no cyanosis, no visible JVD   - RESPIRATORY: respiratory effort normal without any distress or labored breathing   - MUSCULOSKELETAL: Normal ROM of neck and upper extremities observed   - SKIN: No rash on face  - NEUROLOGIC:  No facial asymmetry (Cranial nerve 7 motor function), No gaze palsy   - PSYCHIATRIC: Normal affect, Normal insight and judgement     Data Reviewed:    Latest Reference Range & Units 6/3/19 17:49 10/24/19 06:46 10/23/20 14:15 12/8/21 15:04 1/10/23 16:02   Calcium 8.5 - 10.1 MG/DL 9.2 8.7 9.3 9.8 10.0      Latest Reference Range & Units 5/5/17 16:51   VITAMIN D, 25-HYDROXY 30.0 - 100.0 ng/mL 15.4 (L)   (L): Data is abnormally low      Assessment/Plan: This is a very pleasant 74-year-old female seen for discussion related to incidentally noted compression vertebral fractures and osteoporosis. I do not have a copy of the DEXA at this time and will need to review that prior to deciding what medication to start. In the meantime, I have asked her to meet with her dentist to ensure she does not need to have any extractions or implantations, the risk of osteonecrosis of the jaw associated with the class of medications I will be using. Reassess PTH as it was previously elevated in the setting of hypovitaminosis D. If this remains elevated in the setting of a normal vitamin D, could be indicative of normocalcemic primary hyperparathyroidism. Reassess vitamin D following several months of 5000 international units daily.     #T6, T8, T9 vertebral fractures, nontraumatic, osteoporosis  -Reassess for normocalcemic primary hyperparathyroidism with repeat PTH, CMP, vitamin D following several months of supplementation of 5000 international units daily  -Discuss need for extractions/implantations with dentist prior to using either Prolia or Reclast      Copy sent to:Marvin Fox MD    RTC 3 mo    [facility]

## 2023-02-22 ENCOUNTER — DOCUMENTATION ONLY (OUTPATIENT)
Dept: ENDOCRINOLOGY | Age: 68
End: 2023-02-22

## 2023-02-22 NOTE — PROGRESS NOTES
Spoke with pt to let her know her t-score was -1.4 in the lumbar spine, L femoral neck 0.6, L total hip- 0.1. major osteoporotic fx risk 4.2%, hip fx 0.1%. T6 severe biconcave density, T8 mild wedge deformity, T9 moderate wedge deformity. Plan for reclast in June.     Krupa Lomax, [address] [facility]

## 2023-06-06 ENCOUNTER — TELEPHONE (OUTPATIENT)
Age: 68
End: 2023-06-06

## 2023-06-20 ENCOUNTER — OFFICE VISIT (OUTPATIENT)
Facility: CLINIC | Age: 68
End: 2023-06-20
Payer: MEDICARE

## 2023-06-20 VITALS
HEART RATE: 50 BPM | SYSTOLIC BLOOD PRESSURE: 133 MMHG | HEIGHT: 61 IN | WEIGHT: 191 LBS | DIASTOLIC BLOOD PRESSURE: 77 MMHG | OXYGEN SATURATION: 100 % | TEMPERATURE: 98.3 F | BODY MASS INDEX: 36.06 KG/M2 | RESPIRATION RATE: 16 BRPM

## 2023-06-20 DIAGNOSIS — M75.82 ROTATOR CUFF TENDONITIS, LEFT: Primary | ICD-10-CM

## 2023-06-20 PROCEDURE — 3078F DIAST BP <80 MM HG: CPT | Performed by: FAMILY MEDICINE

## 2023-06-20 PROCEDURE — G8399 PT W/DXA RESULTS DOCUMENT: HCPCS | Performed by: FAMILY MEDICINE

## 2023-06-20 PROCEDURE — G8428 CUR MEDS NOT DOCUMENT: HCPCS | Performed by: FAMILY MEDICINE

## 2023-06-20 PROCEDURE — 99214 OFFICE O/P EST MOD 30 MIN: CPT | Performed by: FAMILY MEDICINE

## 2023-06-20 PROCEDURE — 4004F PT TOBACCO SCREEN RCVD TLK: CPT | Performed by: FAMILY MEDICINE

## 2023-06-20 PROCEDURE — 1090F PRES/ABSN URINE INCON ASSESS: CPT | Performed by: FAMILY MEDICINE

## 2023-06-20 PROCEDURE — G8417 CALC BMI ABV UP PARAM F/U: HCPCS | Performed by: FAMILY MEDICINE

## 2023-06-20 PROCEDURE — 1123F ACP DISCUSS/DSCN MKR DOCD: CPT | Performed by: FAMILY MEDICINE

## 2023-06-20 PROCEDURE — 3075F SYST BP GE 130 - 139MM HG: CPT | Performed by: FAMILY MEDICINE

## 2023-06-20 PROCEDURE — 3017F COLORECTAL CA SCREEN DOC REV: CPT | Performed by: FAMILY MEDICINE

## 2023-06-20 RX ORDER — NAPROXEN 375 MG/1
TABLET ORAL
COMMUNITY
Start: 2023-06-17

## 2023-06-20 NOTE — PROGRESS NOTES
Chief Complaint   Patient presents with    Shoulder Pain     Patient is here for left shoulder pain      she is a 75y.o. year old female who presents for evaluation of left shoulder pain    Pain Assessment Encounter      Truong Donta  6/20/2023  Onset of Symptoms: Patient states she has had pain for months   ________________________________________________________________________  Description:Patient states no injures     Frequency: 4-5 times a day  Pain Scale:(1-10): 4  Trauma Hx: none  Hx of similar symptoms: No:   Radiation: Yes : , arm  Duration:  continuous      Progression: has worsened  What makes it better?: heat and OTC meds  What makes it worse?:strecthing  Medications tried: acetaminophen    Reviewed and agree with Nurse Note and duplicated in this note. Reviewed PmHx, RxHx, FmHx, SocHx, AllgHx and updated and dated in the chart. Family History   Problem Relation Age of Onset    Cancer Mother        Past Medical History:   Diagnosis Date    COVID-19 virus infection 11/24/2022    BOGGS (dyspnea on exertion) 06/03/2019    Encounter for Hemoccult screening 07/19/2017    neg    Fall 01/11/2021    Family history of ovarian cancer 2008    mother    Fracture, thoracic vertebra ([address] 06/06/2022    bone density - t,t8,t    H/O cardiovascular stress test 06/17/2019    LVEF equals 59%. Left ventricular wall motion and thickening is normal    H/O gastric bypass 2003    md wilson    Hepatitis C     rx ended 2008,2014 viral load non detected, imelda johnson md    Hypertension     pt states she does not have htn - 3/1/22    Laryngitis     Left lower lobe pulmonary nodule 01/26/2021    left lower lobe there is a new part solid 4 mm    Normal cardiac stress test 6-2-06/ 6/17/19    LVEF equals 59%.  Left ventricular wall motion and thickening is normal    Obesity     Osteoporosis 06/06/2022    [facility] notes -clinical osteoporosis compression T6, T8, T9 managed by Cheri Oliver MD repeat DEXA

## 2023-07-11 ENCOUNTER — TELEPHONE (OUTPATIENT)
Age: 68
End: 2023-07-11

## 2023-07-12 ENCOUNTER — OFFICE VISIT (OUTPATIENT)
Age: 68
End: 2023-07-12
Payer: MEDICARE

## 2023-07-12 VITALS
HEART RATE: 57 BPM | OXYGEN SATURATION: 100 % | DIASTOLIC BLOOD PRESSURE: 68 MMHG | RESPIRATION RATE: 16 BRPM | SYSTOLIC BLOOD PRESSURE: 136 MMHG | WEIGHT: 195.5 LBS | BODY MASS INDEX: 38.38 KG/M2 | HEIGHT: 60 IN

## 2023-07-12 DIAGNOSIS — M80.00XD AGE-RELATED OSTEOPOROSIS WITH CURRENT PATHOLOGICAL FRACTURE WITH ROUTINE HEALING, SUBSEQUENT ENCOUNTER: Primary | ICD-10-CM

## 2023-07-12 PROCEDURE — G8427 DOCREV CUR MEDS BY ELIG CLIN: HCPCS | Performed by: INTERNAL MEDICINE

## 2023-07-12 PROCEDURE — 1123F ACP DISCUSS/DSCN MKR DOCD: CPT | Performed by: INTERNAL MEDICINE

## 2023-07-12 PROCEDURE — 1036F TOBACCO NON-USER: CPT | Performed by: INTERNAL MEDICINE

## 2023-07-12 PROCEDURE — 1090F PRES/ABSN URINE INCON ASSESS: CPT | Performed by: INTERNAL MEDICINE

## 2023-07-12 PROCEDURE — G8399 PT W/DXA RESULTS DOCUMENT: HCPCS | Performed by: INTERNAL MEDICINE

## 2023-07-12 PROCEDURE — G8417 CALC BMI ABV UP PARAM F/U: HCPCS | Performed by: INTERNAL MEDICINE

## 2023-07-12 PROCEDURE — 99214 OFFICE O/P EST MOD 30 MIN: CPT | Performed by: INTERNAL MEDICINE

## 2023-07-12 PROCEDURE — 3017F COLORECTAL CA SCREEN DOC REV: CPT | Performed by: INTERNAL MEDICINE

## 2023-07-12 PROCEDURE — 3078F DIAST BP <80 MM HG: CPT | Performed by: INTERNAL MEDICINE

## 2023-07-12 PROCEDURE — 3075F SYST BP GE 130 - 139MM HG: CPT | Performed by: INTERNAL MEDICINE

## 2023-07-12 RX ORDER — FAMOTIDINE 10 MG/ML
20 INJECTION, SOLUTION INTRAVENOUS
OUTPATIENT
Start: 2023-07-12

## 2023-07-12 RX ORDER — DIPHENHYDRAMINE HYDROCHLORIDE 50 MG/ML
50 INJECTION INTRAMUSCULAR; INTRAVENOUS
OUTPATIENT
Start: 2023-07-12

## 2023-07-12 RX ORDER — SODIUM CHLORIDE 9 MG/ML
INJECTION, SOLUTION INTRAVENOUS CONTINUOUS
OUTPATIENT
Start: 2023-07-12

## 2023-07-12 RX ORDER — ONDANSETRON 2 MG/ML
8 INJECTION INTRAMUSCULAR; INTRAVENOUS
OUTPATIENT
Start: 2023-07-12

## 2023-07-12 RX ORDER — ALBUTEROL SULFATE 90 UG/1
4 AEROSOL, METERED RESPIRATORY (INHALATION) PRN
OUTPATIENT
Start: 2023-07-12

## 2023-07-12 RX ORDER — ACETAMINOPHEN 325 MG/1
650 TABLET ORAL
OUTPATIENT
Start: 2023-07-12

## 2023-07-12 RX ORDER — EPINEPHRINE 1 MG/ML
0.3 INJECTION, SOLUTION, CONCENTRATE INTRAVENOUS PRN
OUTPATIENT
Start: 2023-07-12

## 2023-07-12 NOTE — PROGRESS NOTES
Chief Complaint   Patient presents with    Follow-up    Osteoporosis        History of Present Illness: Gokul Desai is a 75 y.o. female with a past medical history significant for thoracic vertebral fractures,  gastric bypass, hepatitis C seen in referral from Tejinder Antoine MD for discussion related to osteoporosis management. Raquel King reports that she was incidentally found to have vertebral fractures in her thoracic vertebra (T6/8/9), reports that she did not experience pain related to the fractures. Does endorse a degree of kyphosis that is disappointing to her. Not aware  of any other fractures. Does have a right mandibular bridge and does not think she needs to have any extractions or implantations in the near future. Was begun on 5000 international units daily of vitamin D by OB/GYN and has been taking this consistently  along with daily multivitamin which contains somewhere around 800 international units daily. 2023: Spoke with pt to let her know her t-score was -1.4 in the lumbar spine, L femoral neck 0.6, L total hip- 0.1. major osteoporotic fx risk 4.2%, hip fx 0.1%. T6 severe biconcave density, T8 mild wedge deformity, T9 moderate wedge deformity. No pending dental work at this time. Concerned about kyphosis- working in  home- busy during end of year typically.         Current Outpatient Medications:     naproxen (NAPROSYN) 375 MG tablet, , Disp: , Rfl:     VITAMIN A PO, Take 1 tablet by mouth daily, Disp: , Rfl:     vitamin D (CHOLECALCIFEROL) 125 MCG (5000 UT) CAPS capsule, Take 1 capsule by mouth daily, Disp: , Rfl:     hydroCHLOROthiazide (HYDRODIURIL) 12.5 MG tablet, Take 1 tablet by mouth daily, Disp: , Rfl:     Semaglutide-Weight Management (WEGOVY) 0.25 MG/0.5ML SOAJ SC injection, Inject 0.25 mg into the skin every 7 days (Patient not taking: Reported on 2023), Disp: , Rfl:          Social Hx: Former teacher, retired      Review of Systems:    See

## 2023-08-01 ENCOUNTER — HOSPITAL ENCOUNTER (OUTPATIENT)
Facility: HOSPITAL | Age: 68
Setting detail: INFUSION SERIES
End: 2023-08-01

## 2023-08-02 ENCOUNTER — OFFICE VISIT (OUTPATIENT)
Facility: CLINIC | Age: 68
End: 2023-08-02
Payer: MEDICARE

## 2023-08-02 VITALS
HEIGHT: 60 IN | BODY MASS INDEX: 38.52 KG/M2 | DIASTOLIC BLOOD PRESSURE: 70 MMHG | WEIGHT: 196.2 LBS | TEMPERATURE: 98.3 F | OXYGEN SATURATION: 97 % | RESPIRATION RATE: 16 BRPM | SYSTOLIC BLOOD PRESSURE: 112 MMHG | HEART RATE: 60 BPM

## 2023-08-02 DIAGNOSIS — R73.02 IGT (IMPAIRED GLUCOSE TOLERANCE): ICD-10-CM

## 2023-08-02 DIAGNOSIS — I10 PRIMARY HYPERTENSION: Primary | ICD-10-CM

## 2023-08-02 DIAGNOSIS — R91.8 PULMONARY NODULES: ICD-10-CM

## 2023-08-02 DIAGNOSIS — E66.01 SEVERE OBESITY WITH BODY MASS INDEX (BMI) OF 35.0 TO 39.9 WITH SERIOUS COMORBIDITY (HCC): ICD-10-CM

## 2023-08-02 PROCEDURE — 1090F PRES/ABSN URINE INCON ASSESS: CPT | Performed by: INTERNAL MEDICINE

## 2023-08-02 PROCEDURE — 3074F SYST BP LT 130 MM HG: CPT | Performed by: INTERNAL MEDICINE

## 2023-08-02 PROCEDURE — G8427 DOCREV CUR MEDS BY ELIG CLIN: HCPCS | Performed by: INTERNAL MEDICINE

## 2023-08-02 PROCEDURE — G8399 PT W/DXA RESULTS DOCUMENT: HCPCS | Performed by: INTERNAL MEDICINE

## 2023-08-02 PROCEDURE — G8417 CALC BMI ABV UP PARAM F/U: HCPCS | Performed by: INTERNAL MEDICINE

## 2023-08-02 PROCEDURE — 1036F TOBACCO NON-USER: CPT | Performed by: INTERNAL MEDICINE

## 2023-08-02 PROCEDURE — 3078F DIAST BP <80 MM HG: CPT | Performed by: INTERNAL MEDICINE

## 2023-08-02 PROCEDURE — 3017F COLORECTAL CA SCREEN DOC REV: CPT | Performed by: INTERNAL MEDICINE

## 2023-08-02 PROCEDURE — 99214 OFFICE O/P EST MOD 30 MIN: CPT | Performed by: INTERNAL MEDICINE

## 2023-08-02 PROCEDURE — 1123F ACP DISCUSS/DSCN MKR DOCD: CPT | Performed by: INTERNAL MEDICINE

## 2023-08-02 SDOH — ECONOMIC STABILITY: FOOD INSECURITY: WITHIN THE PAST 12 MONTHS, THE FOOD YOU BOUGHT JUST DIDN'T LAST AND YOU DIDN'T HAVE MONEY TO GET MORE.: NEVER TRUE

## 2023-08-02 SDOH — HEALTH STABILITY: PHYSICAL HEALTH: ON AVERAGE, HOW MANY MINUTES DO YOU ENGAGE IN EXERCISE AT THIS LEVEL?: 0 MIN

## 2023-08-02 SDOH — ECONOMIC STABILITY: HOUSING INSECURITY
IN THE LAST 12 MONTHS, WAS THERE A TIME WHEN YOU DID NOT HAVE A STEADY PLACE TO SLEEP OR SLEPT IN A SHELTER (INCLUDING NOW)?: NO

## 2023-08-02 SDOH — ECONOMIC STABILITY: TRANSPORTATION INSECURITY
IN THE PAST 12 MONTHS, HAS THE LACK OF TRANSPORTATION KEPT YOU FROM MEDICAL APPOINTMENTS OR FROM GETTING MEDICATIONS?: NO

## 2023-08-02 SDOH — ECONOMIC STABILITY: INCOME INSECURITY: IN THE LAST 12 MONTHS, WAS THERE A TIME WHEN YOU WERE NOT ABLE TO PAY THE MORTGAGE OR RENT ON TIME?: NO

## 2023-08-02 SDOH — ECONOMIC STABILITY: FOOD INSECURITY: WITHIN THE PAST 12 MONTHS, YOU WORRIED THAT YOUR FOOD WOULD RUN OUT BEFORE YOU GOT MONEY TO BUY MORE.: NEVER TRUE

## 2023-08-02 SDOH — ECONOMIC STABILITY: TRANSPORTATION INSECURITY
IN THE PAST 12 MONTHS, HAS LACK OF TRANSPORTATION KEPT YOU FROM MEETINGS, WORK, OR FROM GETTING THINGS NEEDED FOR DAILY LIVING?: NO

## 2023-08-02 SDOH — HEALTH STABILITY: PHYSICAL HEALTH: ON AVERAGE, HOW MANY DAYS PER WEEK DO YOU ENGAGE IN MODERATE TO STRENUOUS EXERCISE (LIKE A BRISK WALK)?: 0 DAYS

## 2023-08-02 SDOH — ECONOMIC STABILITY: HOUSING INSECURITY: IN THE LAST 12 MONTHS, HOW MANY PLACES HAVE YOU LIVED?: 1

## 2023-08-02 ASSESSMENT — PATIENT HEALTH QUESTIONNAIRE - PHQ9
SUM OF ALL RESPONSES TO PHQ QUESTIONS 1-9: 0
SUM OF ALL RESPONSES TO PHQ9 QUESTIONS 1 & 2: 0
SUM OF ALL RESPONSES TO PHQ QUESTIONS 1-9: 0
1. LITTLE INTEREST OR PLEASURE IN DOING THINGS: 0
2. FEELING DOWN, DEPRESSED OR HOPELESS: 0

## 2023-08-02 ASSESSMENT — SOCIAL DETERMINANTS OF HEALTH (SDOH)
WITHIN THE LAST YEAR, HAVE YOU BEEN KICKED, HIT, SLAPPED, OR OTHERWISE PHYSICALLY HURT BY YOUR PARTNER OR EX-PARTNER?: NO
IN A TYPICAL WEEK, HOW MANY TIMES DO YOU TALK ON THE PHONE WITH FAMILY, FRIENDS, OR NEIGHBORS?: MORE THAN THREE TIMES A WEEK
DO YOU BELONG TO ANY CLUBS OR ORGANIZATIONS SUCH AS CHURCH GROUPS UNIONS, FRATERNAL OR ATHLETIC GROUPS, OR SCHOOL GROUPS?: YES
HOW OFTEN DO YOU ATTEND CHURCH OR RELIGIOUS SERVICES?: MORE THAN 4 TIMES PER YEAR
HOW OFTEN DO YOU ATTENT MEETINGS OF THE CLUB OR ORGANIZATION YOU BELONG TO?: MORE THAN 4 TIMES PER YEAR
HOW HARD IS IT FOR YOU TO PAY FOR THE VERY BASICS LIKE FOOD, HOUSING, MEDICAL CARE, AND HEATING?: NOT HARD AT ALL
WITHIN THE LAST YEAR, HAVE YOU BEEN HUMILIATED OR EMOTIONALLY ABUSED IN OTHER WAYS BY YOUR PARTNER OR EX-PARTNER?: NO
HOW OFTEN DO YOU GET TOGETHER WITH FRIENDS OR RELATIVES?: TWICE A WEEK
WITHIN THE LAST YEAR, HAVE YOU BEEN AFRAID OF YOUR PARTNER OR EX-PARTNER?: NO
WITHIN THE LAST YEAR, HAVE TO BEEN RAPED OR FORCED TO HAVE ANY KIND OF SEXUAL ACTIVITY BY YOUR PARTNER OR EX-PARTNER?: NO

## 2023-08-02 ASSESSMENT — ANXIETY QUESTIONNAIRES
5. BEING SO RESTLESS THAT IT IS HARD TO SIT STILL: 0
3. WORRYING TOO MUCH ABOUT DIFFERENT THINGS: 0
4. TROUBLE RELAXING: 0
IF YOU CHECKED OFF ANY PROBLEMS ON THIS QUESTIONNAIRE, HOW DIFFICULT HAVE THESE PROBLEMS MADE IT FOR YOU TO DO YOUR WORK, TAKE CARE OF THINGS AT HOME, OR GET ALONG WITH OTHER PEOPLE: NOT DIFFICULT AT ALL
7. FEELING AFRAID AS IF SOMETHING AWFUL MIGHT HAPPEN: 0
1. FEELING NERVOUS, ANXIOUS, OR ON EDGE: 0
6. BECOMING EASILY ANNOYED OR IRRITABLE: 0
2. NOT BEING ABLE TO STOP OR CONTROL WORRYING: 0
GAD7 TOTAL SCORE: 0

## 2023-08-02 ASSESSMENT — LIFESTYLE VARIABLES
HOW MANY STANDARD DRINKS CONTAINING ALCOHOL DO YOU HAVE ON A TYPICAL DAY: PATIENT DOES NOT DRINK
HOW OFTEN DO YOU HAVE A DRINK CONTAINING ALCOHOL: MONTHLY OR LESS

## 2023-08-04 ENCOUNTER — TELEPHONE (OUTPATIENT)
Age: 68
End: 2023-08-04

## 2023-08-04 NOTE — TELEPHONE ENCOUNTER
Pt called and lvm with concerns of the Prolia side effects of pain and if that's something that she should be concern of. Pt would like to know if there is another medication that will not cause pain as a side effect  and if it is not, what are the chances of her experiencing the pain after receiving the injection.

## 2023-08-08 ENCOUNTER — TELEPHONE (OUTPATIENT)
Age: 68
End: 2023-08-08

## 2023-08-08 ENCOUNTER — HOSPITAL ENCOUNTER (OUTPATIENT)
Facility: HOSPITAL | Age: 68
Setting detail: INFUSION SERIES
End: 2023-08-08
Payer: MEDICARE

## 2023-08-08 VITALS
DIASTOLIC BLOOD PRESSURE: 69 MMHG | RESPIRATION RATE: 16 BRPM | OXYGEN SATURATION: 97 % | TEMPERATURE: 97.7 F | HEART RATE: 56 BPM | SYSTOLIC BLOOD PRESSURE: 143 MMHG

## 2023-08-08 DIAGNOSIS — M80.00XD AGE-RELATED OSTEOPOROSIS WITH CURRENT PATHOLOGICAL FRACTURE WITH ROUTINE HEALING, SUBSEQUENT ENCOUNTER: Primary | ICD-10-CM

## 2023-08-08 LAB
ALBUMIN SERPL-MCNC: 3.4 G/DL (ref 3.5–5)
ALBUMIN/GLOB SERPL: 0.9 (ref 1.1–2.2)
ALP SERPL-CCNC: 96 U/L (ref 45–117)
ALT SERPL-CCNC: 18 U/L (ref 12–78)
ANION GAP SERPL CALC-SCNC: 7 MMOL/L (ref 5–15)
AST SERPL-CCNC: 20 U/L (ref 15–37)
BILIRUB SERPL-MCNC: 0.3 MG/DL (ref 0.2–1)
BUN SERPL-MCNC: 17 MG/DL (ref 6–20)
BUN/CREAT SERPL: 16 (ref 12–20)
CALCIUM SERPL-MCNC: 9.5 MG/DL (ref 8.5–10.1)
CHLORIDE SERPL-SCNC: 110 MMOL/L (ref 97–108)
CO2 SERPL-SCNC: 24 MMOL/L (ref 21–32)
CREAT SERPL-MCNC: 1.09 MG/DL (ref 0.55–1.02)
GLOBULIN SER CALC-MCNC: 4 G/DL (ref 2–4)
GLUCOSE SERPL-MCNC: 119 MG/DL (ref 65–100)
MAGNESIUM SERPL-MCNC: 2.3 MG/DL (ref 1.6–2.4)
PHOSPHATE SERPL-MCNC: 3.6 MG/DL (ref 2.6–4.7)
POTASSIUM SERPL-SCNC: 4.4 MMOL/L (ref 3.5–5.1)
PROT SERPL-MCNC: 7.4 G/DL (ref 6.4–8.2)
SODIUM SERPL-SCNC: 141 MMOL/L (ref 136–145)

## 2023-08-08 PROCEDURE — 96372 THER/PROPH/DIAG INJ SC/IM: CPT

## 2023-08-08 PROCEDURE — 6360000002 HC RX W HCPCS: Performed by: INTERNAL MEDICINE

## 2023-08-08 PROCEDURE — 83735 ASSAY OF MAGNESIUM: CPT

## 2023-08-08 PROCEDURE — 84100 ASSAY OF PHOSPHORUS: CPT

## 2023-08-08 PROCEDURE — 36415 COLL VENOUS BLD VENIPUNCTURE: CPT

## 2023-08-08 PROCEDURE — 80053 COMPREHEN METABOLIC PANEL: CPT

## 2023-08-08 RX ORDER — ALBUTEROL SULFATE 90 UG/1
4 AEROSOL, METERED RESPIRATORY (INHALATION) PRN
OUTPATIENT
Start: 2024-02-06

## 2023-08-08 RX ORDER — SODIUM CHLORIDE 9 MG/ML
INJECTION, SOLUTION INTRAVENOUS CONTINUOUS
OUTPATIENT
Start: 2024-02-06

## 2023-08-08 RX ORDER — DIPHENHYDRAMINE HYDROCHLORIDE 50 MG/ML
50 INJECTION INTRAMUSCULAR; INTRAVENOUS
OUTPATIENT
Start: 2024-02-06

## 2023-08-08 RX ORDER — ONDANSETRON 2 MG/ML
8 INJECTION INTRAMUSCULAR; INTRAVENOUS
OUTPATIENT
Start: 2024-02-06

## 2023-08-08 RX ORDER — ACETAMINOPHEN 325 MG/1
650 TABLET ORAL
OUTPATIENT
Start: 2024-02-06

## 2023-08-08 RX ORDER — EPINEPHRINE 1 MG/ML
0.3 INJECTION, SOLUTION, CONCENTRATE INTRAVENOUS PRN
OUTPATIENT
Start: 2024-02-06

## 2023-08-08 RX ADMIN — DENOSUMAB 60 MG: 60 INJECTION SUBCUTANEOUS at 16:26

## 2023-08-08 NOTE — TELEPHONE ENCOUNTER
Pt was notified of Dr Cinthia Tang message regarding the questions of the prolia side effects and she voiced understanding of what was read to her.

## 2023-12-20 NOTE — PROGRESS NOTES
1. Have you been to the ER, urgent care clinic since your last visit? Hospitalized since your last visit? Yes When: 10-24-19 Where: [facility] Reason for visit: abdominal pain    2. Have you seen or consulted any other health care providers outside of the [address] [place] [address] since your last visit? Include any pap smears or colon screening.  No     ER follow up Occupational Therapy Visit    Visit Type: Daily Treatment Note  Visit: 2  Referring Provider: Javon Mcmahan MD  Medical Diagnosis (from order): S52.502D, S52.602D - Fx lower radius/ulna-closed, left, with routine healing, subsequent encounter     SUBJECTIVE                                                                                                               Reports \"tingling\" to left digits from his forearm when sleeping.      OBJECTIVE                                                                                                                     Range of Motion (ROM)   (degrees unless noted; active unless noted; norms in ( ); negative=lacking to 0, positive=beyond 0)  Wrist:   - Flexion (60-80):       Left: 32    - Extension (60-70):       Left: 74                        Treatment    Fluidotherapy (33446)  - Location: Left hand and wrist with active range of motion.  - Duration: 10 minutes    Modalities provided in order to improve tissue extensibility, improve circulation, and to reduce pain in preparation for functional activities.   Results: tissue softening and improved circulation  Reaction: no adverse reaction to treatment      Therapeutic Exercise  Performed in order to improve LUE strength and ROM for increased independence with functional mobility and ADLs:     -[employer] Therapeutic Equipment 162 L gripper with 65 pounds for 240 seconds   -[employer] Therapeutic Equipment left hand with wrist, 8 pound 200 seconds      Manual Therapy   Performed in order to improve joint osteokinematics and tissue extensibility for improved mobility and increased independence with ADLs and functional mobility:    Soft tissue management to left wrist with joint mobilizations, and Mulligan mobilizations, and PROM to L wrist and forearm.        Skilled input: verbal instruction/cues, tactile instruction/cues, facilitation and demonstration    Writer verbally educated and received verbal consent for hand  placement, positioning of patient, and techniques to be performed today from patient   Home Exercise Program  12/20/2023:  Instructed to rest left in neutral forearm position to avoid compression to median nerve.  Instructed to wear left wrist brace with sleep to reduce c/o \"Tingling.\"      ASSESSMENT                                                                                                            Continues to c/o \"tingling\" to left hand, no c/o during therapy.  Education:   - Present and ready to learn: patient    PLAN                                                                                                                           Suggestions for next session as indicated: Progress per plan of care      Goals  Decrease c/o left wrist pain and Tingling=3/10    Increase left wrist active range of motion with flexion=48, extension =68 and supination=78, pronation=76 degrees    Increase left  strength=45 pounds    The above improvements in impairments to assist in obtaining goals listed below  Long Term Goals: to be met by end of plan of care   1. Report using ADL modification, joint protection techniques, and HEP to manage symptoms independently.   2. Decrease c/o left wrist pain and Tingling to be able to manipulate utensils  3. Increase left wrist and forearm active range of motion  to be able to groom and wash his face.         4. Increase left  strength to open jars.      Therapy procedure time and total treatment time can be found documented on the Time Entry flowsheet

## 2024-01-10 ENCOUNTER — OFFICE VISIT (OUTPATIENT)
Age: 69
End: 2024-01-10

## 2024-01-10 VITALS
OXYGEN SATURATION: 100 % | BODY MASS INDEX: 37.38 KG/M2 | HEIGHT: 61 IN | WEIGHT: 198 LBS | RESPIRATION RATE: 16 BRPM | SYSTOLIC BLOOD PRESSURE: 123 MMHG | HEART RATE: 53 BPM | DIASTOLIC BLOOD PRESSURE: 76 MMHG

## 2024-01-10 DIAGNOSIS — M80.00XD AGE-RELATED OSTEOPOROSIS WITH CURRENT PATHOLOGICAL FRACTURE WITH ROUTINE HEALING, SUBSEQUENT ENCOUNTER: Primary | ICD-10-CM

## 2024-01-10 RX ORDER — TIRZEPATIDE 2.5 MG/.5ML
2.5 INJECTION, SOLUTION SUBCUTANEOUS
Qty: 2 ML | Refills: 0 | Status: SHIPPED | OUTPATIENT
Start: 2024-01-10

## 2024-01-10 NOTE — PROGRESS NOTES
Chief Complaint   Patient presents with    Osteoporosis          History of Present Illness: Ani Barone is a 67 y.o. female with a past medical history significant for thoracic vertebral fractures,  gastric bypass, hepatitis C seen in referral from Jassi Barclay MD for discussion related to osteoporosis management.      Ani reports that she was incidentally found to have vertebral fractures in her thoracic vertebra (T6/8/9), reports that she did not experience pain related to the fractures.  Does endorse a degree of kyphosis that is disappointing to her.  Not aware  of any other fractures.  Does have a right mandibular bridge and does not think she needs to have any extractions or implantations in the near future.  Was begun on 5000 international units daily of vitamin D by OB/GYN and has been taking this consistently  along with daily multivitamin which contains somewhere around 800 international units daily.      2023: Spoke with pt to let her know her t-score was -1.4 in the lumbar spine, L femoral neck 0.6, L total hip- 0.1. major osteoporotic fx risk 4.2%, hip fx 0.1%. T6 severe biconcave density, T8 mild wedge deformity, T9 moderate wedge deformity. No pending dental work at this time. Concerned about kyphosis- working in  home- busy during end of year typically.     01/10/2024: First prolia 2023.  Would like to take zepbound and is willing to pay cash for this.  No history of pancreatitis or heavy alcohol use.  Would like to lose about 10 to 15 pounds.     Current Outpatient Medications:     VITAMIN A PO, Take 1 tablet by mouth daily, Disp: , Rfl:     vitamin D (CHOLECALCIFEROL) 125 MCG (5000 UT) CAPS capsule, Take 1 capsule by mouth daily, Disp: , Rfl:     hydroCHLOROthiazide (HYDRODIURIL) 12.5 MG tablet, Take 1 tablet by mouth daily, Disp: , Rfl:          Social Hx: Former teacher, retired      Review of Systems:    See HPI       Physical Examination:   Visit

## 2024-01-16 ENCOUNTER — TELEPHONE (OUTPATIENT)
Age: 69
End: 2024-01-16

## 2024-01-16 RX ORDER — TIRZEPATIDE 2.5 MG/.5ML
2.5 INJECTION, SOLUTION SUBCUTANEOUS
Qty: 2 ML | Refills: 0 | Status: SHIPPED | OUTPATIENT
Start: 2024-01-16

## 2024-01-16 NOTE — TELEPHONE ENCOUNTER
Contact patient on 1/16/2024 at 11:46am est pt verified identification at this time the patient has concerns regarding the cost of her medication Zepcound .

## 2024-01-29 NOTE — TELEPHONE ENCOUNTER
Pt called and lvm stating that per her pharmacy, pt is not able use the Zepbound coupon due to having medicare insurance.    Pt will like to know if there is an alternative medication that can be prescribed.

## 2024-01-30 RX ORDER — TIRZEPATIDE 2.5 MG/.5ML
2.5 INJECTION, SOLUTION SUBCUTANEOUS
Qty: 2 ML | Refills: 0 | Status: SHIPPED | OUTPATIENT
Start: 2024-01-30

## 2024-01-30 NOTE — TELEPHONE ENCOUNTER
Spoke with pt to make her aware of provider's information and was that she did some research and was made aware that she may be able to get the rx  approved at  [facility] but a dx will be needed.

## 2024-01-31 ENCOUNTER — TRANSCRIBE ORDERS (OUTPATIENT)
Facility: HOSPITAL | Age: 69
End: 2024-01-31

## 2024-01-31 DIAGNOSIS — Z12.31 SCREENING MAMMOGRAM FOR HIGH-RISK PATIENT: Primary | ICD-10-CM

## 2024-02-06 ENCOUNTER — OFFICE VISIT (OUTPATIENT)
Facility: CLINIC | Age: 69
End: 2024-02-06
Payer: MEDICARE

## 2024-02-06 VITALS
HEIGHT: 61 IN | RESPIRATION RATE: 16 BRPM | SYSTOLIC BLOOD PRESSURE: 138 MMHG | DIASTOLIC BLOOD PRESSURE: 80 MMHG | BODY MASS INDEX: 37.61 KG/M2 | HEART RATE: 96 BPM | OXYGEN SATURATION: 100 % | TEMPERATURE: 97.5 F | WEIGHT: 199.2 LBS

## 2024-02-06 DIAGNOSIS — Z12.31 ENCOUNTER FOR SCREENING MAMMOGRAM FOR MALIGNANT NEOPLASM OF BREAST: ICD-10-CM

## 2024-02-06 DIAGNOSIS — R91.8 PULMONARY NODULES: ICD-10-CM

## 2024-02-06 DIAGNOSIS — R91.1 LEFT LOWER LOBE PULMONARY NODULE: ICD-10-CM

## 2024-02-06 DIAGNOSIS — Z01.419 ENCOUNTER FOR WELL WOMAN EXAM: ICD-10-CM

## 2024-02-06 DIAGNOSIS — Z98.84 H/O GASTRIC BYPASS: ICD-10-CM

## 2024-02-06 DIAGNOSIS — R73.02 IGT (IMPAIRED GLUCOSE TOLERANCE): ICD-10-CM

## 2024-02-06 DIAGNOSIS — E78.5 DYSLIPIDEMIA: ICD-10-CM

## 2024-02-06 DIAGNOSIS — I10 PRIMARY HYPERTENSION: ICD-10-CM

## 2024-02-06 DIAGNOSIS — M80.00XS AGE-RELATED OSTEOPOROSIS WITH CURRENT PATHOLOGICAL FRACTURE, SEQUELA: ICD-10-CM

## 2024-02-06 DIAGNOSIS — Z00.00 MEDICARE ANNUAL WELLNESS VISIT, SUBSEQUENT: Primary | ICD-10-CM

## 2024-02-06 DIAGNOSIS — R91.1 PULMONARY NODULE, RIGHT: ICD-10-CM

## 2024-02-06 DIAGNOSIS — E66.01 SEVERE OBESITY WITH BODY MASS INDEX (BMI) OF 35.0 TO 39.9 WITH SERIOUS COMORBIDITY (HCC): ICD-10-CM

## 2024-02-06 DIAGNOSIS — Z80.41 FAMILY HISTORY OF OVARIAN CANCER: ICD-10-CM

## 2024-02-06 PROCEDURE — G0439 PPPS, SUBSEQ VISIT: HCPCS | Performed by: INTERNAL MEDICINE

## 2024-02-06 PROCEDURE — G8484 FLU IMMUNIZE NO ADMIN: HCPCS | Performed by: INTERNAL MEDICINE

## 2024-02-06 PROCEDURE — 3079F DIAST BP 80-89 MM HG: CPT | Performed by: INTERNAL MEDICINE

## 2024-02-06 PROCEDURE — G8417 CALC BMI ABV UP PARAM F/U: HCPCS | Performed by: INTERNAL MEDICINE

## 2024-02-06 PROCEDURE — 1036F TOBACCO NON-USER: CPT | Performed by: INTERNAL MEDICINE

## 2024-02-06 PROCEDURE — 1090F PRES/ABSN URINE INCON ASSESS: CPT | Performed by: INTERNAL MEDICINE

## 2024-02-06 PROCEDURE — 1123F ACP DISCUSS/DSCN MKR DOCD: CPT | Performed by: INTERNAL MEDICINE

## 2024-02-06 PROCEDURE — 3075F SYST BP GE 130 - 139MM HG: CPT | Performed by: INTERNAL MEDICINE

## 2024-02-06 PROCEDURE — 3017F COLORECTAL CA SCREEN DOC REV: CPT | Performed by: INTERNAL MEDICINE

## 2024-02-06 PROCEDURE — G8427 DOCREV CUR MEDS BY ELIG CLIN: HCPCS | Performed by: INTERNAL MEDICINE

## 2024-02-06 PROCEDURE — 99214 OFFICE O/P EST MOD 30 MIN: CPT | Performed by: INTERNAL MEDICINE

## 2024-02-06 PROCEDURE — G8399 PT W/DXA RESULTS DOCUMENT: HCPCS | Performed by: INTERNAL MEDICINE

## 2024-02-06 ASSESSMENT — PATIENT HEALTH QUESTIONNAIRE - PHQ9
SUM OF ALL RESPONSES TO PHQ QUESTIONS 1-9: 0
SUM OF ALL RESPONSES TO PHQ9 QUESTIONS 1 & 2: 0
2. FEELING DOWN, DEPRESSED OR HOPELESS: 0
1. LITTLE INTEREST OR PLEASURE IN DOING THINGS: 0

## 2024-02-06 ASSESSMENT — LIFESTYLE VARIABLES
HOW OFTEN DO YOU HAVE A DRINK CONTAINING ALCOHOL: MONTHLY OR LESS
HOW MANY STANDARD DRINKS CONTAINING ALCOHOL DO YOU HAVE ON A TYPICAL DAY: 1 OR 2

## 2024-02-06 NOTE — PROGRESS NOTES
Ani Barone is a 68 y.o. female  Chief Complaint   Patient presents with    Medicare AWV     Patient here for Annual Medicare Wellness Exam.         YES Answers must have Comments  1. \"Have you been to the ER, urgent care clinic since your last visit?  Hospitalized since your last visit?\"    [] YES   [x] NO       2. “Have you seen or consulted any other health care providers outside of [facility] since your last visit?”    [] YES   [x] NO       3.  For patients aged 45-75: “Have you had a colorectal cancer screening such as a colonoscopy/FIT/Cologuard?  Nurse/CMA to request records if not in chart   [x] YES When: 2022  [] NO   [] NA, based on age    If the patient is female:      4. For female patients aged 40-74: “Have you had a mammogram in the last two years?” Nurse/CMA to request records if not in chart   [x] YES When: 2022  [] NO   [] NA, based on age    5.  For female patients aged 21-65: “Have you had a pap smear?”  Nurse/CMA to request records if not in chart   [] YES   [] NO  [x] NA, based on age

## 2024-02-06 NOTE — PROGRESS NOTES
Medicare Annual Wellness Visit    Ani MarksPavelOusmane is here for Medicare AWV (Patient here for Annual Medicare Wellness Exam.)    Assessment & Plan   Medicare annual wellness visit, subsequent  Recommendations for Preventive Services Due: see orders and patient instructions/AVS.  Recommended screening schedule for the next 5-10 years is provided to the patient in written form: see Patient Instructions/AVS.     No follow-ups on file.     Subjective       Patient's complete Health Risk Assessment and screening values have been reviewed and are found in Flowsheets. The following problems were reviewed today and where indicated follow up appointments were made and/or referrals ordered.    Positive Risk Factor Screenings with Interventions:                Activity, Diet, and Weight:  On average, how many days per week do you engage in moderate to strenuous exercise (like a brisk walk)?: 0 days  On average, how many minutes do you engage in exercise at this level?: 0 min    Do you eat balanced/healthy meals regularly?: Yes    Body mass index is 37.64 kg/m². (!) Abnormal      Inactivity Interventions:  See A/P for plan and any pertinent orders  Obesity Interventions:  See A/P for plan and any pertinent orders                 Advanced Directives:  Do you have a Living Will?: (!) No    Intervention:  has NO advanced directive - information provided                     Objective   Vitals:    02/06/24 1451   BP: (!) 153/81   Site: Left Upper Arm   Position: Sitting   Cuff Size: Large Adult   Pulse: 96   Resp: 16   Temp: 97.5 °F (36.4 °C)   TempSrc: Oral   SpO2: 100%   Weight: 90.4 kg (199 lb 3.2 oz)   Height: 1.549 m (5' 1\")      Body mass index is 37.64 kg/m².               Allergies   Allergen Reactions    Codeine Hives     Prior to Visit Medications    Medication Sig Taking? Authorizing Provider   Tirzepatide-Weight Management (ZEPBOUND) 2.5 MG/0.5ML SOAJ Inject 2.5 mg into the skin every 7 days Dx z68.37 Yes Gage

## 2024-02-06 NOTE — PATIENT INSTRUCTIONS
Learning About Being Active as an Older Adult  Why is being active important as you get older?     Being active is one of the best things you can do for your health. And it's never too late to start. Being active--or getting active, if you aren't already--has definite benefits. It can:  Give you more energy,  Keep your mind sharp.  Improve balance to reduce your risk of falls.  Help you manage chronic illness with fewer medicines.  No matter how old you are, how fit you are, or what health problems you have, there is a form of activity that will work for you. And the more physical activity you can do, the better your overall health will be.  What kinds of activity can help you stay healthy?  Being more active will make your daily activities easier. Physical activity includes planned exercise and things you do in daily life. There are four types of activity:  Aerobic.  Doing aerobic activity makes your heart and lungs strong.  Includes walking, dancing, and gardening.  Aim for at least 2½ hours spread throughout the week.  It improves your energy and can help you sleep better.  Muscle-strengthening.  This type of activity can help maintain muscle and strengthen bones.  Includes climbing stairs, using resistance bands, and lifting or carrying heavy loads.  Aim for at least twice a week.  It can help protect the knees and other joints.  Stretching.  Stretching gives you better range of motion in joints and muscles.  Includes upper arm stretches, calf stretches, and gentle yoga.  Aim for at least twice a week, preferably after your muscles are warmed up from other activities.  It can help you function better in daily life.  Balancing.  This helps you stay coordinated and have good posture.  Includes heel-to-toe walking, alessandra chi, and certain types of yoga.  Aim for at least 3 days a week.  It can reduce your risk of falling.  Even if you have a hard time meeting the recommendations, it's better to be more active

## 2024-02-06 NOTE — PROGRESS NOTES
SPORTS MEDICINE AND PRIMARY [facility]ARE  Jassi Barclay MD, FACP, CMD  [address] [facility] [address]  Phone:  [phone]  Fax: [phone]       Chief Complaint   Patient presents with    Medicare AWV     Patient here for Annual Medicare Wellness Exam.   .      SUBJECTIVE:    Ani Barone is a 68 y.o. female  Dictation on: 02/06/2024  3:28 PM by: JASSI BARCLAY [[id number]]          Current Outpatient Medications   Medication Sig Dispense Refill    Tirzepatide (MOUNJARO) 5 MG/0.5ML SOPN SC injection Inject 0.5 mLs into the skin once a week 4 Adjustable Dose Pre-filled Pen Syringe 0    Tirzepatide-Weight Management (ZEPBOUND) 2.5 MG/0.5ML SOAJ Inject 2.5 mg into the skin every 7 days Dx z68.37 2 mL 0    VITAMIN A PO Take 1 tablet by mouth daily      vitamin D (CHOLECALCIFEROL) 125 MCG (5000 UT) CAPS capsule Take 1 capsule by mouth daily      hydroCHLOROthiazide (HYDRODIURIL) 12.5 MG tablet Take 1 tablet by mouth daily       No current facility-administered medications for this visit.     Past Medical History:   Diagnosis Date    COVID-19 virus infection 11/24/2022    BOGGS (dyspnea on exertion) 06/03/2019    Encounter for Hemoccult screening 07/19/2017    neg    Fall 01/11/2021    Family history of ovarian cancer 2008    mother    Fracture, thoracic vertebra (HCC) 06/06/2022    bone density - t,t8,t    H/O cardiovascular stress test 06/17/2019    LVEF equals 59%. Left ventricular wall motion and thickening is normal    H/O gastric bypass 2003    md wilson    Hepatitis C     rx ended 2008,2014 viral load non detected, imelda johnson md    Hypertension     pt states she does not have htn - 3/1/22    Laryngitis     Left lower lobe pulmonary nodule 01/26/2021    left lower lobe there is a new part solid 4 mm    Normal cardiac stress test 6-2-06/ 6/17/19    LVEF equals 59%. Left ventricular wall motion and thickening is normal    Obesity     Osteoporosis 06/06/2022    [facility] notes

## 2024-02-07 LAB
ALBUMIN SERPL-MCNC: 3.6 G/DL (ref 3.5–5)
ALBUMIN/GLOB SERPL: 1.1 (ref 1.1–2.2)
ALP SERPL-CCNC: 88 U/L (ref 45–117)
ALT SERPL-CCNC: 15 U/L (ref 12–78)
ANION GAP SERPL CALC-SCNC: 3 MMOL/L (ref 5–15)
APPEARANCE UR: CLEAR
AST SERPL-CCNC: 16 U/L (ref 15–37)
BACTERIA URNS QL MICRO: ABNORMAL /HPF
BASOPHILS # BLD: 0 K/UL (ref 0–0.1)
BASOPHILS NFR BLD: 1 % (ref 0–1)
BILIRUB SERPL-MCNC: 0.3 MG/DL (ref 0.2–1)
BILIRUB UR QL: NEGATIVE
BUN SERPL-MCNC: 17 MG/DL (ref 6–20)
BUN/CREAT SERPL: 18 (ref 12–20)
CALCIUM SERPL-MCNC: 9.3 MG/DL (ref 8.5–10.1)
CHLORIDE SERPL-SCNC: 110 MMOL/L (ref 97–108)
CHOLEST SERPL-MCNC: 176 MG/DL
CO2 SERPL-SCNC: 29 MMOL/L (ref 21–32)
COLOR UR: ABNORMAL
CREAT SERPL-MCNC: 0.93 MG/DL (ref 0.55–1.02)
DIFFERENTIAL METHOD BLD: ABNORMAL
EOSINOPHIL # BLD: 0.1 K/UL (ref 0–0.4)
EOSINOPHIL NFR BLD: 1 % (ref 0–7)
EPITH CASTS URNS QL MICRO: ABNORMAL /LPF
ERYTHROCYTE [DISTWIDTH] IN BLOOD BY AUTOMATED COUNT: 14.9 % (ref 11.5–14.5)
EST. AVERAGE GLUCOSE BLD GHB EST-MCNC: 114 MG/DL
GLOBULIN SER CALC-MCNC: 3.2 G/DL (ref 2–4)
GLUCOSE SERPL-MCNC: 98 MG/DL (ref 65–100)
GLUCOSE UR STRIP.AUTO-MCNC: NEGATIVE MG/DL
HBA1C MFR BLD: 5.6 % (ref 4–5.6)
HCT VFR BLD AUTO: 38.8 % (ref 35–47)
HDLC SERPL-MCNC: 64 MG/DL
HDLC SERPL: 2.8 (ref 0–5)
HGB BLD-MCNC: 12.5 G/DL (ref 11.5–16)
HGB UR QL STRIP: NEGATIVE
IMM GRANULOCYTES # BLD AUTO: 0 K/UL (ref 0–0.04)
IMM GRANULOCYTES NFR BLD AUTO: 0 % (ref 0–0.5)
KETONES UR QL STRIP.AUTO: NEGATIVE MG/DL
LDLC SERPL CALC-MCNC: 93.6 MG/DL (ref 0–100)
LEUKOCYTE ESTERASE UR QL STRIP.AUTO: NEGATIVE
LYMPHOCYTES # BLD: 2.9 K/UL (ref 0.8–3.5)
LYMPHOCYTES NFR BLD: 38 % (ref 12–49)
MCH RBC QN AUTO: 26.2 PG (ref 26–34)
MCHC RBC AUTO-ENTMCNC: 32.2 G/DL (ref 30–36.5)
MCV RBC AUTO: 81.3 FL (ref 80–99)
MONOCYTES # BLD: 0.5 K/UL (ref 0–1)
MONOCYTES NFR BLD: 6 % (ref 5–13)
NEUTS SEG # BLD: 4.2 K/UL (ref 1.8–8)
NEUTS SEG NFR BLD: 54 % (ref 32–75)
NITRITE UR QL STRIP.AUTO: NEGATIVE
NRBC # BLD: 0 K/UL (ref 0–0.01)
NRBC BLD-RTO: 0 PER 100 WBC
PH UR STRIP: 6 (ref 5–8)
PLATELET # BLD AUTO: 227 K/UL (ref 150–400)
PMV BLD AUTO: 11.6 FL (ref 8.9–12.9)
POTASSIUM SERPL-SCNC: 4.2 MMOL/L (ref 3.5–5.1)
PROT SERPL-MCNC: 6.8 G/DL (ref 6.4–8.2)
PROT UR STRIP-MCNC: NEGATIVE MG/DL
RBC # BLD AUTO: 4.77 M/UL (ref 3.8–5.2)
RBC #/AREA URNS HPF: ABNORMAL /HPF (ref 0–5)
SODIUM SERPL-SCNC: 142 MMOL/L (ref 136–145)
SP GR UR REFRACTOMETRY: 1.02 (ref 1–1.03)
TRIGL SERPL-MCNC: 92 MG/DL
TSH SERPL DL<=0.05 MIU/L-ACNC: 2.32 UIU/ML (ref 0.36–3.74)
UROBILINOGEN UR QL STRIP.AUTO: 0.2 EU/DL (ref 0.2–1)
VLDLC SERPL CALC-MCNC: 18.4 MG/DL
WBC # BLD AUTO: 7.7 K/UL (ref 3.6–11)
WBC URNS QL MICRO: ABNORMAL /HPF (ref 0–4)

## 2024-02-09 DIAGNOSIS — E66.9 OBESITY (BMI 35.0-39.9 WITHOUT COMORBIDITY): Primary | ICD-10-CM

## 2024-02-09 RX ORDER — TIRZEPATIDE 2.5 MG/.5ML
2.5 INJECTION, SOLUTION SUBCUTANEOUS
Qty: 2 ML | Refills: 0 | Status: SHIPPED | OUTPATIENT
Start: 2024-02-09

## 2024-02-13 ENCOUNTER — HOSPITAL ENCOUNTER (OUTPATIENT)
Facility: HOSPITAL | Age: 69
Setting detail: INFUSION SERIES
Discharge: HOME | End: 2024-02-13
Payer: MEDICARE

## 2024-02-13 VITALS
SYSTOLIC BLOOD PRESSURE: 146 MMHG | TEMPERATURE: 96.9 F | RESPIRATION RATE: 18 BRPM | HEART RATE: 50 BPM | DIASTOLIC BLOOD PRESSURE: 69 MMHG

## 2024-02-13 DIAGNOSIS — M80.00XD AGE-RELATED OSTEOPOROSIS WITH CURRENT PATHOLOGICAL FRACTURE WITH ROUTINE HEALING, SUBSEQUENT ENCOUNTER: Primary | ICD-10-CM

## 2024-02-13 PROCEDURE — 6360000002 HC RX W HCPCS: Performed by: INTERNAL MEDICINE

## 2024-02-13 PROCEDURE — 96372 THER/PROPH/DIAG INJ SC/IM: CPT

## 2024-02-13 RX ORDER — ACETAMINOPHEN 325 MG/1
650 TABLET ORAL
OUTPATIENT
Start: 2024-08-04

## 2024-02-13 RX ORDER — EPINEPHRINE 1 MG/ML
0.3 INJECTION, SOLUTION INTRAMUSCULAR; SUBCUTANEOUS PRN
OUTPATIENT
Start: 2024-08-04

## 2024-02-13 RX ORDER — SODIUM CHLORIDE 9 MG/ML
INJECTION, SOLUTION INTRAVENOUS CONTINUOUS
OUTPATIENT
Start: 2024-08-04

## 2024-02-13 RX ORDER — DIPHENHYDRAMINE HYDROCHLORIDE 50 MG/ML
50 INJECTION INTRAMUSCULAR; INTRAVENOUS
OUTPATIENT
Start: 2024-08-04

## 2024-02-13 RX ORDER — ONDANSETRON 2 MG/ML
8 INJECTION INTRAMUSCULAR; INTRAVENOUS
OUTPATIENT
Start: 2024-08-04

## 2024-02-13 RX ORDER — ALBUTEROL SULFATE 90 UG/1
4 AEROSOL, METERED RESPIRATORY (INHALATION) PRN
OUTPATIENT
Start: 2024-08-04

## 2024-02-13 RX ADMIN — DENOSUMAB 60 MG: 60 INJECTION SUBCUTANEOUS at 15:25

## 2024-02-13 NOTE — PROGRESS NOTES
[facility] Short Note                       Date: 2024    Name: Ani Barone    MRN: [id number]         : 1955      Pt admit to [facility] for Prolia ambulatory in stable condition. Assessment completed and documented in flowsheets. No acute concerns at this time. Pt denies recent or upcoming invasive dental procedures.   Please review pending lab results in CC.      Ms. Barone's vitals were reviewed:  Patient Vitals for the past 12 hrs:   Temp Pulse Resp BP   24 1513 96.9 °F (36.1 °C) (!) 47 18 (!) 146/69       Medications given: given in left arm  Medications Administered         denosumab (PROLIA) SC injection 60 mg Admin Date  2024 Action  Given Dose  60 mg Route  SubCUTAneous Administered By  Celeste Li RN          Pt states her HR is usually low at baseline and is asymptomatic. States it is not abnormal for it to be in the 40s.    Ms. Barone tolerated the injection and [facility]. Patient is aware if future appointments.    Future Appointments   Date Time Provider Department Center   2024  2:50 PM TAI GILLIAM 1 [facility]   2024  2:10 PM Yvette Almonte MD E [facility] BS AMB   2024  2:30 PM Jassi Barclay MD [facility] MAIN BS AMB       CELESTE LI RN  2024  3:48 PM

## 2024-02-28 ENCOUNTER — HOSPITAL ENCOUNTER (OUTPATIENT)
Age: 69
Discharge: HOME | End: 2024-03-02
Payer: MEDICARE

## 2024-02-28 VITALS — HEIGHT: 61 IN | WEIGHT: 192 LBS | BODY MASS INDEX: 36.25 KG/M2

## 2024-02-28 DIAGNOSIS — Z12.31 SCREENING MAMMOGRAM FOR HIGH-RISK PATIENT: ICD-10-CM

## 2024-02-28 PROCEDURE — 77063 BREAST TOMOSYNTHESIS BI: CPT

## 2024-03-06 ENCOUNTER — HOSPITAL ENCOUNTER (OUTPATIENT)
Facility: HOSPITAL | Age: 69
Discharge: HOME | End: 2024-03-09
Attending: INTERNAL MEDICINE
Payer: MEDICARE

## 2024-03-06 DIAGNOSIS — R91.1 LEFT LOWER LOBE PULMONARY NODULE: ICD-10-CM

## 2024-03-06 DIAGNOSIS — R91.8 PULMONARY NODULES: ICD-10-CM

## 2024-03-06 DIAGNOSIS — I10 PRIMARY HYPERTENSION: ICD-10-CM

## 2024-03-06 DIAGNOSIS — R91.1 PULMONARY NODULE, RIGHT: ICD-10-CM

## 2024-03-06 PROCEDURE — 71250 CT THORAX DX C-: CPT

## 2024-05-05 LAB
25(OH)D3+25(OH)D2 SERPL-MCNC: 34.2 NG/ML (ref 30–100)
ALBUMIN SERPL-MCNC: 4 G/DL (ref 3.9–4.9)
ALBUMIN/GLOB SERPL: 1.5 {RATIO} (ref 1.2–2.2)
ALP SERPL-CCNC: 79 IU/L (ref 44–121)
ALT SERPL-CCNC: 12 IU/L (ref 0–32)
AST SERPL-CCNC: 21 IU/L (ref 0–40)
BILIRUB SERPL-MCNC: 0.3 MG/DL (ref 0–1.2)
BUN SERPL-MCNC: 17 MG/DL (ref 8–27)
BUN/CREAT SERPL: 19 (ref 12–28)
CALCIUM SERPL-MCNC: 9.8 MG/DL (ref 8.7–10.3)
CHLORIDE SERPL-SCNC: 106 MMOL/L (ref 96–106)
CO2 SERPL-SCNC: 21 MMOL/L (ref 20–29)
CREAT SERPL-MCNC: 0.9 MG/DL (ref 0.57–1)
EGFRCR SERPLBLD CKD-EPI 2021: 69 ML/MIN/1.73
GLOBULIN SER CALC-MCNC: 2.6 G/DL (ref 1.5–4.5)
GLUCOSE SERPL-MCNC: 96 MG/DL (ref 70–99)
POTASSIUM SERPL-SCNC: 5 MMOL/L (ref 3.5–5.2)
PROT SERPL-MCNC: 6.6 G/DL (ref 6–8.5)
SODIUM SERPL-SCNC: 142 MMOL/L (ref 134–144)

## 2024-05-07 ENCOUNTER — OFFICE VISIT (OUTPATIENT)
Age: 69
End: 2024-05-07
Payer: MEDICARE

## 2024-05-07 VITALS
SYSTOLIC BLOOD PRESSURE: 103 MMHG | WEIGHT: 192.2 LBS | HEART RATE: 61 BPM | RESPIRATION RATE: 16 BRPM | OXYGEN SATURATION: 100 % | DIASTOLIC BLOOD PRESSURE: 69 MMHG | BODY MASS INDEX: 36.29 KG/M2 | HEIGHT: 61 IN

## 2024-05-07 DIAGNOSIS — M81.0 AGE-RELATED OSTEOPOROSIS WITHOUT CURRENT PATHOLOGICAL FRACTURE: ICD-10-CM

## 2024-05-07 DIAGNOSIS — M80.00XD AGE-RELATED OSTEOPOROSIS WITH CURRENT PATHOLOGICAL FRACTURE WITH ROUTINE HEALING, SUBSEQUENT ENCOUNTER: Primary | ICD-10-CM

## 2024-05-07 LAB — PTH-INTACT SERPL-MCNC: 48 PG/ML (ref 15–65)

## 2024-05-07 PROCEDURE — 1123F ACP DISCUSS/DSCN MKR DOCD: CPT | Performed by: INTERNAL MEDICINE

## 2024-05-07 PROCEDURE — G8428 CUR MEDS NOT DOCUMENT: HCPCS | Performed by: INTERNAL MEDICINE

## 2024-05-07 PROCEDURE — 3017F COLORECTAL CA SCREEN DOC REV: CPT | Performed by: INTERNAL MEDICINE

## 2024-05-07 PROCEDURE — G8399 PT W/DXA RESULTS DOCUMENT: HCPCS | Performed by: INTERNAL MEDICINE

## 2024-05-07 PROCEDURE — G8417 CALC BMI ABV UP PARAM F/U: HCPCS | Performed by: INTERNAL MEDICINE

## 2024-05-07 PROCEDURE — 99214 OFFICE O/P EST MOD 30 MIN: CPT | Performed by: INTERNAL MEDICINE

## 2024-05-07 PROCEDURE — 3074F SYST BP LT 130 MM HG: CPT | Performed by: INTERNAL MEDICINE

## 2024-05-07 PROCEDURE — G2211 COMPLEX E/M VISIT ADD ON: HCPCS | Performed by: INTERNAL MEDICINE

## 2024-05-07 PROCEDURE — 3078F DIAST BP <80 MM HG: CPT | Performed by: INTERNAL MEDICINE

## 2024-05-07 PROCEDURE — 1036F TOBACCO NON-USER: CPT | Performed by: INTERNAL MEDICINE

## 2024-05-07 PROCEDURE — 1090F PRES/ABSN URINE INCON ASSESS: CPT | Performed by: INTERNAL MEDICINE

## 2024-05-07 RX ORDER — TIRZEPATIDE 5 MG/.5ML
5 INJECTION, SOLUTION SUBCUTANEOUS
Qty: 2 ML | Refills: 0 | Status: SHIPPED | OUTPATIENT
Start: 2024-05-07 | End: 2024-05-08 | Stop reason: SDUPTHER

## 2024-05-07 NOTE — PROGRESS NOTES
Tirzepatide-Weight Management (ZEPBOUND) 2.5 MG/0.5ML SOAJ, Inject 2.5 mg into the skin every 7 days Dx z68.37 (Patient not taking: Reported on 5/7/2024), Disp: 2 mL, Rfl: 0         Social Hx: Former teacher, retired      Review of Systems:    See HPI       Physical Examination:   Visit Vitals  /69   Pulse 61   Resp 16   Ht 1.549 m (5' 1\")   Wt 87.2 kg (192 lb 3.2 oz)   SpO2 100%   BMI 36.32 kg/m²       - GENERAL: NCAT, Appears well nourished   - EYES: EOMI, non-icteric, no proptosis   - Ear/Nose/Throat: NCAT, no visible inflammation or masses   - CARDIOVASCULAR: no cyanosis, no visible JVD   - RESPIRATORY: respiratory effort normal without  any distress or labored breathing   - MUSCULOSKELETAL: Normal ROM of neck and upper extremities observed   - SKIN: No rash on face  - NEUROLOGIC:  No facial asymmetry (Cranial nerve 7 motor function), No gaze palsy   - PSYCHIATRIC: Normal  affect, Normal insight and judgement       Data Reviewed:        (L): Data is abnormally low           Latest Reference Range & Units 01/10/23 16:02   Sodium 136 - 145 mmol/L 140   Potassium 3.5 - 5.1 mmol/L 4.7   Chloride 97 - 108 mmol/L 107   CO2 21 - 32 mmol/L 29   BUN,BUNPL 6 - 20 MG/DL 23 (H)   Creatinine 0.55 - 1.02 MG/DL 0.86   Bun/Cre Ratio 12 - 20   27 (H)   Anion Gap 5 - 15 mmol/L 4 (L)   Glucose, Random 65 - 100 mg/dL 92   CALCIUM, SERUM, 044336 8.5 - 10.1 MG/DL 10.0   Total Protein 6.4 - 8.2 g/dL 6.7   ESTIMATED GLOMERULAR FILTRATION RATE >60 ml/min/1.73m2 >60   (H): Data is abnormally high  (L): Data is abnormally low     Assessment/Plan: This is a very pleasant 67-year-old female seen for discussion related to incidentally noted compression vertebral fractures and osteoporosis.  As of July 2023, start prolia every 6 mo given 3 vert fractures- again discussed risk of ONJ and need to avoid abrupt discontinuation/consolidate with reclast if this is done. Repeat DEXA 06/2024.      #T6, T8, T9 vertebral fractures,

## 2024-05-08 RX ORDER — TIRZEPATIDE 5 MG/.5ML
5 INJECTION, SOLUTION SUBCUTANEOUS
Qty: 2 ML | Refills: 0 | Status: SHIPPED | OUTPATIENT
Start: 2024-05-08

## 2024-06-25 ENCOUNTER — OFFICE VISIT (OUTPATIENT)
Facility: CLINIC | Age: 69
End: 2024-06-25
Payer: MEDICARE

## 2024-06-25 VITALS
HEART RATE: 52 BPM | BODY MASS INDEX: 36.82 KG/M2 | TEMPERATURE: 97.8 F | HEIGHT: 61 IN | RESPIRATION RATE: 20 BRPM | DIASTOLIC BLOOD PRESSURE: 70 MMHG | OXYGEN SATURATION: 100 % | WEIGHT: 195 LBS | SYSTOLIC BLOOD PRESSURE: 134 MMHG

## 2024-06-25 DIAGNOSIS — E66.01 SEVERE OBESITY WITH BODY MASS INDEX (BMI) OF 35.0 TO 39.9 WITH SERIOUS COMORBIDITY (HCC): Primary | ICD-10-CM

## 2024-06-25 DIAGNOSIS — R91.8 PULMONARY NODULES: ICD-10-CM

## 2024-06-25 DIAGNOSIS — I10 PRIMARY HYPERTENSION: ICD-10-CM

## 2024-06-25 DIAGNOSIS — R10.11 RIGHT UPPER QUADRANT ABDOMINAL PAIN: ICD-10-CM

## 2024-06-25 DIAGNOSIS — M81.0 AGE-RELATED OSTEOPOROSIS WITHOUT CURRENT PATHOLOGICAL FRACTURE: ICD-10-CM

## 2024-06-25 PROBLEM — Z86.19 HEPATITIS C VIRUS INFECTION CURED AFTER ANTIVIRAL DRUG THERAPY: Status: ACTIVE | Noted: 2024-06-25

## 2024-06-25 PROCEDURE — 1036F TOBACCO NON-USER: CPT | Performed by: INTERNAL MEDICINE

## 2024-06-25 PROCEDURE — G8399 PT W/DXA RESULTS DOCUMENT: HCPCS | Performed by: INTERNAL MEDICINE

## 2024-06-25 PROCEDURE — 1123F ACP DISCUSS/DSCN MKR DOCD: CPT | Performed by: INTERNAL MEDICINE

## 2024-06-25 PROCEDURE — 3075F SYST BP GE 130 - 139MM HG: CPT | Performed by: INTERNAL MEDICINE

## 2024-06-25 PROCEDURE — G8417 CALC BMI ABV UP PARAM F/U: HCPCS | Performed by: INTERNAL MEDICINE

## 2024-06-25 PROCEDURE — 3017F COLORECTAL CA SCREEN DOC REV: CPT | Performed by: INTERNAL MEDICINE

## 2024-06-25 PROCEDURE — 1090F PRES/ABSN URINE INCON ASSESS: CPT | Performed by: INTERNAL MEDICINE

## 2024-06-25 PROCEDURE — G8427 DOCREV CUR MEDS BY ELIG CLIN: HCPCS | Performed by: INTERNAL MEDICINE

## 2024-06-25 PROCEDURE — 3078F DIAST BP <80 MM HG: CPT | Performed by: INTERNAL MEDICINE

## 2024-06-25 PROCEDURE — 99214 OFFICE O/P EST MOD 30 MIN: CPT | Performed by: INTERNAL MEDICINE

## 2024-06-25 ASSESSMENT — PATIENT HEALTH QUESTIONNAIRE - PHQ9
SUM OF ALL RESPONSES TO PHQ QUESTIONS 1-9: 0
1. LITTLE INTEREST OR PLEASURE IN DOING THINGS: NOT AT ALL
SUM OF ALL RESPONSES TO PHQ9 QUESTIONS 1 & 2: 0
2. FEELING DOWN, DEPRESSED OR HOPELESS: NOT AT ALL
SUM OF ALL RESPONSES TO PHQ QUESTIONS 1-9: 0

## 2024-06-25 NOTE — PROGRESS NOTES
Chief Complaint   Patient presents with    pain in right side     \"Have you been to the ER, urgent care clinic since your last visit?  Hospitalized since your last visit?\"    YES - When: approximately 1  weeks ago.  Where and Why: [facility].for side pain.    “Have you seen or consulted any other health care providers outside of [facility] since your last visit?”    NO            Click Here for Release of Records Request  
reviewed.    PLAN:  Orders Placed This Encounter   Procedures    CT ABDOMEN PELVIS W IV CONTRAST Additional Contrast? Radiologist Recommendation     Standing Status:   Future     Standing Expiration Date:   6/25/2025     Order Specific Question:   Additional Contrast?     Answer:   Radiologist Recommendation     Order Specific Question:   STAT Creatinine as needed:     Answer:   Yes    Urinalysis with Reflex to Culture     Standing Status:   Future     Number of Occurrences:   1     Standing Expiration Date:   6/25/2025     Order Specific Question:   SPECIFY(EX-CATH,MIDSTREAM,CYSTO,ETC)?     Answer:   ms        Follow-up and Dispositions    Return in about 2 months (around 8/25/2024).                ATTENTION:   This medical record was transcribed using an electronic medical records system.  Although proofread, it may and can contain electronic and spelling errors.  Other human spelling and other errors may be present.  Corrections may be executed at a later time.  Please feel free to contact us for any clarifications as needed.

## 2024-06-26 LAB
APPEARANCE UR: CLEAR
BACTERIA URNS QL MICRO: NEGATIVE /HPF
BILIRUB UR QL: NEGATIVE
COLOR UR: NORMAL
EPITH CASTS URNS QL MICRO: NORMAL /LPF
GLUCOSE UR STRIP.AUTO-MCNC: NEGATIVE MG/DL
HGB UR QL STRIP: NEGATIVE
HYALINE CASTS URNS QL MICRO: NORMAL /LPF (ref 0–5)
KETONES UR QL STRIP.AUTO: NEGATIVE MG/DL
LEUKOCYTE ESTERASE UR QL STRIP.AUTO: NEGATIVE
NITRITE UR QL STRIP.AUTO: NEGATIVE
PH UR STRIP: 5.5 (ref 5–8)
PROT UR STRIP-MCNC: NEGATIVE MG/DL
RBC #/AREA URNS HPF: NORMAL /HPF (ref 0–5)
SP GR UR REFRACTOMETRY: 1.02 (ref 1–1.03)
URINE CULTURE IF INDICATED: NORMAL
UROBILINOGEN UR QL STRIP.AUTO: 0.2 EU/DL (ref 0.2–1)
WBC URNS QL MICRO: NORMAL /HPF (ref 0–4)

## 2024-06-27 RX ORDER — TIRZEPATIDE 5 MG/.5ML
INJECTION, SOLUTION SUBCUTANEOUS
Qty: 2 ML | Refills: 0 | Status: SHIPPED | OUTPATIENT
Start: 2024-06-27

## 2024-07-02 ENCOUNTER — HOSPITAL ENCOUNTER (OUTPATIENT)
Age: 69
Discharge: HOME | End: 2024-07-05
Payer: MEDICARE

## 2024-07-02 DIAGNOSIS — R10.11 RIGHT UPPER QUADRANT ABDOMINAL PAIN: ICD-10-CM

## 2024-07-02 PROCEDURE — 74177 CT ABD & PELVIS W/CONTRAST: CPT

## 2024-07-02 PROCEDURE — 6360000004 HC RX CONTRAST MEDICATION: Performed by: RADIOLOGY

## 2024-07-02 RX ADMIN — IOPAMIDOL 100 ML: 755 INJECTION, SOLUTION INTRAVENOUS at 13:41

## 2024-07-15 DIAGNOSIS — R10.31 RIGHT LOWER QUADRANT ABDOMINAL PAIN: Primary | ICD-10-CM

## 2024-07-15 DIAGNOSIS — R10.32 LEFT LOWER QUADRANT ABDOMINAL PAIN: Primary | ICD-10-CM

## 2024-07-18 ENCOUNTER — HOSPITAL ENCOUNTER (OUTPATIENT)
Age: 69
Discharge: HOME | End: 2024-07-18
Payer: MEDICARE

## 2024-07-18 DIAGNOSIS — M80.00XD AGE-RELATED OSTEOPOROSIS WITH CURRENT PATHOLOGICAL FRACTURE WITH ROUTINE HEALING, SUBSEQUENT ENCOUNTER: ICD-10-CM

## 2024-07-18 DIAGNOSIS — M81.0 AGE-RELATED OSTEOPOROSIS WITHOUT CURRENT PATHOLOGICAL FRACTURE: ICD-10-CM

## 2024-07-18 PROCEDURE — 77080 DXA BONE DENSITY AXIAL: CPT

## 2024-07-30 NOTE — PROGRESS NOTES
SPORTS MEDICINE AND PRIMARY CARE  Inge Gifford MD, [place], [address] [id number]  Phone:  [phone]  Fax: [phone]       Chief Complaint   Patient presents with    Hip Pain    Back Pain   . SUBJECTIVE:    Tonya Pastor is a 78 y.o. female Patient returns today with a known history of hypertension, obesity, chronic hepatitis C, impaired glucose tolerance, left lower lobe nodule, complains of hip and back pain. She has had imaging studies of her hip that revealed DJD and joint space narrowing several years ago. The last CT scan dated 7/26/21 revealed resolved nodule in the left lower lobe and evidence of prior granulomatosis. She states she has left lower back pain. She took some GasX. She thought it might be gas and, indeed, she passed flatus and the discomfort has now subsided, without a bowel movement and then she subsequently went. We recall she had a colonoscopy on 3/1/22. It was only remarkable for polyps. This is reassuring. Current Outpatient Medications   Medication Sig Dispense Refill    semaglutide, weight loss, (Wegovy) 0.25 mg/0.5 mL pnij 0.25 mg by SubCUTAneous route every seven (7) days. 4 Each 0    hydroCHLOROthiazide (HYDRODIURIL) 12.5 mg tablet Take 1 Tablet by mouth daily. 90 Tablet 3    cholecalciferol (VITAMIN D3) (5000 Units /125 mcg) capsule Take 5,000 Units by mouth daily. albuterol (PROVENTIL HFA, VENTOLIN HFA, PROAIR HFA) 90 mcg/actuation inhaler Take 2 Puffs by inhalation every four (4) hours as needed for Wheezing. 1 Inhaler 11    budesonide-formoterol (SYMBICORT) 160-4.5 mcg/actuation HFAA Take 2 Puffs by inhalation two (2) times a day. 1 Inhaler 11    sodium chloride (OCEAN) 0.65 % nasal squeeze bottle 0.1 mL by Both Nostrils route four (4) times daily. 45 mL 11    fluticasone propionate (FLONASE) 50 mcg/actuation nasal spray 2 Sprays by Both Nostrils route daily.  1 Bottle 11    CYANOCOBALAMIN, VITAMIN B-12, (VITAMIN B-12 PO) Take 1 Tab by mouth daily. multivitamin (ONE A DAY) tablet Take 1 Tab by mouth daily. VITAMIN A PO Take 1 Tab by mouth daily. Past Medical History:   Diagnosis Date    RATLIFF (dyspnea on exertion) 06/03/2019    Encounter for Hemoccult screening 07/19/2017    neg    Fall 01/11/2021    Family history of ovarian cancer 2008    mother    H/O cardiovascular stress test 06/17/2019    LVEF equals 59%. Left ventricular wall motion and thickening is normal    H/O gastric bypass 2003    md roshni    Hepatitis C     rx ended 2008,2014 viral load non detected, syl luna md    Hypertension     pt states she does not have htn - 3/1/22    Laryngitis     Left lower lobe pulmonary nodule 01/26/2021    left lower lobe there is a new part solid 4 mm    Normal cardiac stress test 6-2-06/ 6/17/19    LVEF equals 59%.  Left ventricular wall motion and thickening is normal    Obesity     Plantar fasciitis of right foot     Prediabetes     Pulmonary nodule, right 11-22-15  /  6-2-16    7mm  - repeat 6 mo  stable repeat 6/2/17  - [facility]   no change multiple pul nodules compare to [facility] 7/17/18    Pulmonary nodules 01/09/2020    stable 0no new nodules - high risk pt f/u in 12 months    Renal cyst, right     S/P colonoscopy 2010    S/P colonoscopy 03/01/2022    Heidy Conway MD 5-7 yrs    Shoulder pain, right     Wellness examination 01/09/2018     Past Surgical History:   Procedure Laterality Date    COLONOSCOPY N/A 3/1/2022    COLONOSCOPY    :- performed by Daniela Dasilva MD at [facility] ENDOSCOPY    HX COLONOSCOPY      HX GASTRIC BYPASS  2003    HX GYN       Allergies   Allergen Reactions    Codeine Hives         REVIEW OF SYSTEMS:  General: negative for - chills or fever  ENT: negative for - headaches, nasal congestion or tinnitus  Respiratory: negative for - cough, hemoptysis, shortness of breath or wheezing  Cardiovascular : negative for - chest pain, edema, palpitations or shortness of breath  Gastrointestinal: negative for - abdominal pain, blood in stools, heartburn or nausea/vomiting  Genito-Urinary: no dysuria, trouble voiding, or hematuria  Musculoskeletal: negative for - gait disturbance, joint pain, joint stiffness or joint swelling  Neurological: no TIA or stroke symptoms  Hematologic: no bruises, no bleeding, no swollen glands  Integument: no lumps, mole changes, nail changes or rash  Endocrine: no malaise/lethargy or unexpected weight changes      Social History     Socioeconomic History    Marital status:    Tobacco Use    Smoking status: Never    Smokeless tobacco: Never   Vaping Use    Vaping Use: Never used   Substance and Sexual Activity    Alcohol use: Yes     Comment: occassional - 2 times/month    Drug use: No    Sexual activity: Yes     Partners: Male   Social History Narrative    Medical History: hepatitis C - how urbano luna mdUpper pole renal cystGastroesophageal reflux diseaseVenous stasis ulcersHistory of phlebitisJoe    joint disease kneeShe had endometriosisObesitynegative stress Cardiolite 2006 ejection fraction 62%Family history ovarian    cancerSummer  ophthalmological evaluation    Gyn History: Last pap date 2014. Surgical History: arthroscopic long limb gastric bypass w ith Tom-en-Y gastrojejunostomy md bryn 2003colonoscopy     Hospitalization/Major Diagnostic Procedure: Denies Past Hospitalization        Family History: Mother:  79 yrs Ovarian cancer w ith metastatic disease Father:  80, ca appendix  Sister(s): alive Brother(s): alive Daughter(s):    alive Son(s): alive 3 brother(s) wai saldana - mass mesentery, 1 sister(s) . 1 son(s) , 1 daughter(s) . Social History: Alcohol Use Patient does not use alcohol. Smoking Status Patient is a never smoker. Marital Status: W idow , W idow . Lives    w ith: alone. Occupation/W ork: employed full time teacher.  Education/School: has highschool diploma, has college diploma [employer].retired 10/31/20 rps 33 yrs     Family History   Problem Relation Age of Onset    Cancer Mother        OBJECTIVE:    Visit Vitals  /75 (BP 1 Location: Right arm, BP Patient Position: Sitting)   Pulse (!) 58   Temp 97.9 °F (36.6 °C) (Oral)   Resp 20   Ht 5' 1\" (1.549 m)   Wt 201 lb 11.2 oz (91.5 kg)   SpO2 100%   BMI 38.11 kg/m²     CONSTITUTIONAL: well , well nourished, appears age appropriate  EYES: perrla, eom intact  ENMT:moist mucous membranes, pharynx clear  NECK: supple. Thyroid normal  RESPIRATORY: Chest: clear bilaterally   CARDIOVASCULAR: Heart: regular rate and rhythm  GASTROINTESTINAL: Abdomen: soft, bowel sounds active  HEMATOLOGIC: no pathological lymph nodes palpated  MUSCULOSKELETAL: Extremities: no edema, pulse 1+   INTEGUMENT: No unusual rashes or suspicious skin lesions noted. Nails appear normal.  NEUROLOGIC: non-focal exam   MENTAL STATUS: alert and oriented, appropriate affect           ASSESSMENT:  1. Primary hypertension    2. Severe obesity (BMI 35.0-39. 9) with comorbidity ([address]    3. Chronic hepatitis C without hepatic coma ([address]    4. IGT (impaired glucose tolerance)    5. Severe obesity with body mass index (BMI) of 35.0 to 39.9 with serious comorbidity ([address]    6. Left lower lobe pulmonary nodule      Blood pressure control is at goal.    She is concerned about her obesity and would like to try [facility] TEETEE RADFORD. We will start at 0.25 mg and will increase it every month until we reach the goal of 2.4 mg q week. She is aware of the prior authorization process and approval.      Chronic hepatitis C has been treated. Impaired glucose tolerance remains stable. She had a left lower lobe pulmonary nodule that has resolved. She will be back to see me in December for her annual examination. I have discussed the diagnosis with the patient and the intended plan as seen in the  Orders. The patient understands and agees with the plan.   The patient has   received an after visit summary and questions were answered concerning  future plans  Patient labs and/or xrays were reviewed  Past records were reviewed. PLAN:  .  Orders Placed This Encounter    semaglutide, weight loss, (Wegovy) 0.25 mg/0.5 mL pnij       Follow-up and Dispositions    Return in about 15 weeks (around 12/28/2022). ATTENTION:   This medical record was transcribed using an electronic medical records system. Although proofread, it may and can contain electronic and spelling errors. Other human spelling and other errors may be present. Corrections may be executed at a later time. Please feel free to contact us for any clarifications as needed. restless

## 2024-11-06 ENCOUNTER — TELEMEDICINE (OUTPATIENT)
Age: 69
End: 2024-11-06

## 2024-11-06 DIAGNOSIS — M80.00XD AGE-RELATED OSTEOPOROSIS WITH CURRENT PATHOLOGICAL FRACTURE WITH ROUTINE HEALING, SUBSEQUENT ENCOUNTER: Primary | ICD-10-CM

## 2024-11-06 RX ORDER — ONDANSETRON 2 MG/ML
8 INJECTION INTRAMUSCULAR; INTRAVENOUS
OUTPATIENT
Start: 2024-11-06

## 2024-11-06 RX ORDER — DIPHENHYDRAMINE HYDROCHLORIDE 50 MG/ML
50 INJECTION INTRAMUSCULAR; INTRAVENOUS
OUTPATIENT
Start: 2024-11-06

## 2024-11-06 RX ORDER — FAMOTIDINE 10 MG/ML
20 INJECTION, SOLUTION INTRAVENOUS
OUTPATIENT
Start: 2024-11-06

## 2024-11-06 RX ORDER — ALBUTEROL SULFATE 90 UG/1
4 INHALANT RESPIRATORY (INHALATION) PRN
OUTPATIENT
Start: 2024-11-06

## 2024-11-06 RX ORDER — SODIUM CHLORIDE 9 MG/ML
INJECTION, SOLUTION INTRAVENOUS CONTINUOUS
OUTPATIENT
Start: 2024-11-06

## 2024-11-06 RX ORDER — EPINEPHRINE 1 MG/ML
0.3 INJECTION, SOLUTION, CONCENTRATE INTRAVENOUS PRN
OUTPATIENT
Start: 2024-11-06

## 2024-11-06 RX ORDER — ACETAMINOPHEN 325 MG/1
650 TABLET ORAL
OUTPATIENT
Start: 2024-11-06

## 2024-11-06 NOTE — PROGRESS NOTES
Chief Complaint   Patient presents with    Osteoporosis          History of Present Illness: Ani Barone is a 69 y.o. female with a past medical history significant for thoracic vertebral fractures,  gastric bypass, hepatitis C seen in referral from Jassi Barclay MD for discussion related to osteoporosis management.      Ani reports that she was incidentally found to have vertebral fractures in her thoracic vertebra (T6/8/9), reports that she did not experience pain related to the fractures.  Does endorse a degree of kyphosis that is disappointing to her.  Not aware  of any other fractures.  Does have a right mandibular bridge and does not think she needs to have any extractions or implantations in the near future.  Was begun on 5000 international units daily of vitamin D by OB/GYN and has been taking this consistently  along with daily multivitamin which contains somewhere around 800 international units daily.      2023: Spoke with pt to let her know her t-score was -1.4 in the lumbar spine, L femoral neck 0.6, L total hip- 0.1. major osteoporotic fx risk 4.2%, hip fx 0.1%. T6 severe biconcave density, T8 mild wedge deformity, T9 moderate wedge deformity. No pending dental work at this time. Concerned about kyphosis- working in  home- busy during end of year typically.     01/10/2024: First prolia 2023.  Would like to take zepbound and is willing to pay cash for this.  No history of pancreatitis or heavy alcohol use.  Would like to lose about 10 to 15 pounds.    2024: Paying 900$ for zepbound- has taken 2.5mg dose for 2 months. First prolia 2023, second dose 2024.    2024: Didn't get August prolia- thought she had but was just DEXA. Having major construction on bathroom right now. Will be resuming prolia- went to [place] for 69th birthday. Willing to continue prolia for 2 more years.      Current Outpatient Medications:     ZEPBOUND 5 MG/0.5ML SOAJ,

## 2024-12-04 ENCOUNTER — HOSPITAL ENCOUNTER (OUTPATIENT)
Facility: HOSPITAL | Age: 69
Setting detail: INFUSION SERIES
Discharge: HOME | End: 2024-12-04
Payer: MEDICARE

## 2024-12-04 VITALS
TEMPERATURE: 97.6 F | SYSTOLIC BLOOD PRESSURE: 140 MMHG | HEART RATE: 64 BPM | DIASTOLIC BLOOD PRESSURE: 79 MMHG | RESPIRATION RATE: 18 BRPM | OXYGEN SATURATION: 97 %

## 2024-12-04 DIAGNOSIS — M80.00XD AGE-RELATED OSTEOPOROSIS WITH CURRENT PATHOLOGICAL FRACTURE WITH ROUTINE HEALING, SUBSEQUENT ENCOUNTER: Primary | ICD-10-CM

## 2024-12-04 LAB
ANION GAP BLD CALC-SCNC: 16.7 MMOL/L (ref 10–20)
CA-I BLD-MCNC: 1.36 MMOL/L (ref 1.15–1.33)
CHLORIDE BLD-SCNC: 102 MMOL/L (ref 98–107)
CO2 BLD-SCNC: 24.3 MMOL/L (ref 21–32)
CREAT BLD-MCNC: 0.79 MG/DL (ref 0.6–1.3)
GLUCOSE BLD-MCNC: 104 MG/DL (ref 74–99)
POTASSIUM BLD-SCNC: 4.1 MMOL/L (ref 3.5–5.1)
SERVICE CMNT-IMP: ABNORMAL
SODIUM BLD-SCNC: 143 MMOL/L (ref 136–145)

## 2024-12-04 PROCEDURE — 80047 BASIC METABLC PNL IONIZED CA: CPT

## 2024-12-04 PROCEDURE — 6360000002 HC RX W HCPCS: Performed by: INTERNAL MEDICINE

## 2024-12-04 PROCEDURE — 96372 THER/PROPH/DIAG INJ SC/IM: CPT

## 2024-12-04 RX ORDER — DIPHENHYDRAMINE HYDROCHLORIDE 50 MG/ML
50 INJECTION INTRAMUSCULAR; INTRAVENOUS
OUTPATIENT
Start: 2025-06-01

## 2024-12-04 RX ORDER — SODIUM CHLORIDE 9 MG/ML
INJECTION, SOLUTION INTRAVENOUS CONTINUOUS
OUTPATIENT
Start: 2025-06-01

## 2024-12-04 RX ORDER — ONDANSETRON 2 MG/ML
8 INJECTION INTRAMUSCULAR; INTRAVENOUS
OUTPATIENT
Start: 2025-06-01

## 2024-12-04 RX ORDER — EPINEPHRINE 1 MG/ML
0.3 INJECTION, SOLUTION INTRAMUSCULAR; SUBCUTANEOUS PRN
OUTPATIENT
Start: 2025-06-01

## 2024-12-04 RX ORDER — ALBUTEROL SULFATE 90 UG/1
4 INHALANT RESPIRATORY (INHALATION) PRN
OUTPATIENT
Start: 2025-06-01

## 2024-12-04 RX ORDER — ACETAMINOPHEN 325 MG/1
650 TABLET ORAL
OUTPATIENT
Start: 2025-06-01

## 2024-12-04 RX ORDER — HYDROCORTISONE SODIUM SUCCINATE 100 MG/2ML
100 INJECTION INTRAMUSCULAR; INTRAVENOUS
OUTPATIENT
Start: 2025-06-01

## 2024-12-04 RX ADMIN — DENOSUMAB 60 MG: 60 INJECTION SUBCUTANEOUS at 14:48

## 2024-12-04 ASSESSMENT — PAIN SCALES - GENERAL: PAINLEVEL_OUTOF10: 0

## 2024-12-04 NOTE — PROGRESS NOTES
[facility] Peds/Adult Note                       Date: 2024    Name: Ani Barone    MRN: [id number]         : 1955    1405 Patient arrives for Prolia Q 6 Months without acute problems. Please see Epic for complete assessment and education provided.    Vital signs stable throughout and prior to discharge. Patient tolerated procedure well and was discharged without incident.  Patient is aware of next [facility] appointment on 2025.  Appointment card give to the Patient.       Ms. Barone's vitals were reviewed prior to and after treatment.   Patient Vitals for the past 12 hrs:   Temp Pulse Resp BP SpO2   24 1405 97.6 °F (36.4 °C) 64 18 (!) 140/79 97 %         Lab results were obtained and reviewed.  Recent Results (from the past 12 hour(s))   POC CHEM 8    Collection Time: 24  2:16 PM   Result Value Ref Range    POC Ionized Calcium 1.36 (H) 1.15 - 1.33 mmol/L    POC Sodium 143 136 - 145 mmol/L    POC Potassium 4.1 3.5 - 5.1 mmol/L    POC Chloride 102 98 - 107 mmol/L    POC TCO2 24.3 21 - 32 mmol/L    Anion Gap, POC 16.7 10 - 20 mmol/L    POC Glucose 104 (H) 74 - 99 mg/dL    POC Creatinine 0.79 0.6 - 1.3 mg/dL    eGFR, POC 80 >60 ml/min/1.73m2    UA Comment Comment Not Indicated.         Medications given:   Medications Administered         denosumab (PROLIA) SC injection 60 mg Admin Date  2024 Action  Given Dose  60 mg Route  SubCUTAneous Documented By  Claudio Herrera RN          Ms. Barone tolerated the infusion, and had no complaints.    [facility].     Future Appointments   Date Time Provider Department Center   2025  1:50 PM Yvette Almonte MD RDE [facility] BS [facility]   2025  3:00 PM PEDS FASTTRACK 2 [facility]       CLAUDIO HERRERA RN  2024  3:06 PM

## 2024-12-11 RX ORDER — HYDROCHLOROTHIAZIDE 12.5 MG/1
12.5 TABLET ORAL PRN
Qty: 30 TABLET | Refills: 11 | Status: SHIPPED | OUTPATIENT
Start: 2024-12-11

## 2025-02-06 ENCOUNTER — OFFICE VISIT (OUTPATIENT)
Facility: CLINIC | Age: 70
End: 2025-02-06

## 2025-02-06 VITALS
TEMPERATURE: 97.7 F | BODY MASS INDEX: 36.23 KG/M2 | WEIGHT: 191.9 LBS | RESPIRATION RATE: 18 BRPM | DIASTOLIC BLOOD PRESSURE: 86 MMHG | HEART RATE: 55 BPM | HEIGHT: 61 IN | OXYGEN SATURATION: 99 % | SYSTOLIC BLOOD PRESSURE: 136 MMHG

## 2025-02-06 DIAGNOSIS — R91.8 PULMONARY NODULES: ICD-10-CM

## 2025-02-06 DIAGNOSIS — E66.01 SEVERE OBESITY WITH BODY MASS INDEX (BMI) OF 35.0 TO 39.9 WITH SERIOUS COMORBIDITY: ICD-10-CM

## 2025-02-06 DIAGNOSIS — Z98.84 H/O GASTRIC BYPASS: ICD-10-CM

## 2025-02-06 DIAGNOSIS — Z01.419 ENCOUNTER FOR WELL WOMAN EXAM: ICD-10-CM

## 2025-02-06 DIAGNOSIS — E78.5 DYSLIPIDEMIA: ICD-10-CM

## 2025-02-06 DIAGNOSIS — Z00.00 MEDICARE ANNUAL WELLNESS VISIT, SUBSEQUENT: Primary | ICD-10-CM

## 2025-02-06 DIAGNOSIS — M81.0 AGE-RELATED OSTEOPOROSIS WITHOUT CURRENT PATHOLOGICAL FRACTURE: ICD-10-CM

## 2025-02-06 DIAGNOSIS — Z12.31 ENCOUNTER FOR SCREENING MAMMOGRAM FOR MALIGNANT NEOPLASM OF BREAST: ICD-10-CM

## 2025-02-06 DIAGNOSIS — R73.02 IGT (IMPAIRED GLUCOSE TOLERANCE): ICD-10-CM

## 2025-02-06 SDOH — ECONOMIC STABILITY: FOOD INSECURITY: WITHIN THE PAST 12 MONTHS, THE FOOD YOU BOUGHT JUST DIDN'T LAST AND YOU DIDN'T HAVE MONEY TO GET MORE.: NEVER TRUE

## 2025-02-06 SDOH — ECONOMIC STABILITY: FOOD INSECURITY: WITHIN THE PAST 12 MONTHS, YOU WORRIED THAT YOUR FOOD WOULD RUN OUT BEFORE YOU GOT MONEY TO BUY MORE.: NEVER TRUE

## 2025-02-06 ASSESSMENT — ANXIETY QUESTIONNAIRES
6. BECOMING EASILY ANNOYED OR IRRITABLE: NOT AT ALL
5. BEING SO RESTLESS THAT IT IS HARD TO SIT STILL: NOT AT ALL
7. FEELING AFRAID AS IF SOMETHING AWFUL MIGHT HAPPEN: NOT AT ALL
GAD7 TOTAL SCORE: 0
1. FEELING NERVOUS, ANXIOUS, OR ON EDGE: NOT AT ALL
3. WORRYING TOO MUCH ABOUT DIFFERENT THINGS: NOT AT ALL
IF YOU CHECKED OFF ANY PROBLEMS ON THIS QUESTIONNAIRE, HOW DIFFICULT HAVE THESE PROBLEMS MADE IT FOR YOU TO DO YOUR WORK, TAKE CARE OF THINGS AT HOME, OR GET ALONG WITH OTHER PEOPLE: NOT DIFFICULT AT ALL
2. NOT BEING ABLE TO STOP OR CONTROL WORRYING: NOT AT ALL
4. TROUBLE RELAXING: NOT AT ALL

## 2025-02-06 ASSESSMENT — PATIENT HEALTH QUESTIONNAIRE - PHQ9
1. LITTLE INTEREST OR PLEASURE IN DOING THINGS: NOT AT ALL
SUM OF ALL RESPONSES TO PHQ QUESTIONS 1-9: 0
SUM OF ALL RESPONSES TO PHQ9 QUESTIONS 1 & 2: 0
2. FEELING DOWN, DEPRESSED OR HOPELESS: NOT AT ALL
SUM OF ALL RESPONSES TO PHQ QUESTIONS 1-9: 0

## 2025-02-06 NOTE — PATIENT INSTRUCTIONS
decisions for you when you can't speak for yourself. This person is called a health care agent (health care proxy, health care surrogate). The form is also called a durable power of  for health care.  If you do not have an advance directive, decisions about your medical care may be made by a family member, or by a doctor or a  who doesn't know you.  It may help to think of an advance directive as a gift to the people who care for you. If you have one, they won't have to make tough decisions by themselves.  For more information, including forms for your state, see the [employer] website (www.caringinfo.org/planning/advance-directives/).  Follow-up care is a key part of your treatment and safety. Be sure to make and go to all appointments, and call your doctor if you are having problems. It's also a good idea to know your test results and keep a list of the medicines you take.  What should you include in an advance directive?  Many states have a unique advance directive form. (It may ask you to address specific issues.) Or you might use a universal form that's approved by many states.  If your form doesn't tell you what to address, it may be hard to know what to include in your advance directive. Use the questions below to help you get started.  Who do you want to make decisions about your medical care if you are not able to?  What life-support measures do you want if you have a serious illness that gets worse over time or can't be cured?  What are you most afraid of that might happen? (Maybe you're afraid of having pain, losing your independence, or being kept alive by machines.)  Where would you prefer to die? (Your home? A hospital? A nursing home?)  Do you want to donate your organs when you die?  Do you want certain Tenri[employer] practices performed before you die?  When should you call for help?  Be sure to contact your doctor if you have any questions.  Where can you learn more?  Go to

## 2025-02-06 NOTE — PROGRESS NOTES
Chief Complaint   Patient presents with    Medicare AWV     \"Have you been to the ER, urgent care clinic since your last visit?  Hospitalized since your last visit?\"    NO    “Have you seen or consulted any other health care providers outside our system since your last visit?”    NO             
Reconciliation, Ar       CareTeam (Including outside providers/suppliers regularly involved in providing care):   Patient Care Team:  Jassi Barclay MD as PCP - General  Jassi Barclay MD as PCP - Empaneled Provider  Yvette Almonte MD as Physician (Endocrinology)     Recommendations for Preventive Services Due: see orders and patient instructions/AVS.  Recommended screening schedule for the next 5-10 years is provided to the patient in written form: see Patient Instructions/AVS.     Reviewed and updated this visit:  Tobacco  Allergies  Meds             
prescription for Wegovy will be provided.  - Prescription for Zepbound 7.5 mg  - Alternative prescription for Wegovy if Zepbound is not covered by insurance    2. Hypertension: Her blood pressure reading today is 136/86.    3. Osteoporosis: She has been on Prolia every 6 months since July 2023 due to vertebral fractures. The DEXA scan from June 2024 showed osteopenia of both hips. She will continue Prolia for 2 more years and then consolidate. She has T6, T8, and T9 vertebral fractures.  - Continue Prolia every 6 months for 2 more years    4. Lung nodule: Her last lung CT in March 2024 showed no nodules and a decrease in size of the right nodule.  - Schedule follow-up CT scan to monitor the lung nodule    5. Health maintenance:   - Referral to Dr. Edilia Mckinney, a gynecologist, for ovarian cancer screening  - Order mammogram  - Conduct kidney function tests    Follow-up  - Follow up in 6 months     1. Medicare annual wellness visit, subsequent    2. Severe obesity with body mass index (BMI) of 35.0 to 39.9 with serious comorbidity    3. H/O gastric bypass    4. IGT (impaired glucose tolerance)    5. Age-related osteoporosis without current pathological fracture    6. Dyslipidemia    7. Encounter for screening mammogram for malignant neoplasm of breast    8. Encounter for well woman exam    9. Pulmonary nodules        I have discussed the diagnosis with the patient and the intended plan as seen in the  Orders.  The patient understands and agees with the plan.  The patient has   received an after visit summary and questions were answered concerning  future plans  Patient labs and/or xrays were reviewed  Past records were reviewed.    PLAN:  Orders Placed This Encounter   Procedures    TAWANA JUDI DIGITAL SCREEN BILATERAL     Standing Status:   Future     Standing Expiration Date:   4/6/2026    CT CHEST WO CONTRAST     Standing Status:   Future     Standing Expiration Date:   2/6/2026    TSH     Standing Status:   Future

## 2025-02-07 LAB
ALBUMIN SERPL-MCNC: 3.8 G/DL (ref 3.5–5)
ALBUMIN/GLOB SERPL: 1.1 (ref 1.1–2.2)
ALP SERPL-CCNC: 84 U/L (ref 45–117)
ALT SERPL-CCNC: 22 U/L (ref 12–78)
ANION GAP SERPL CALC-SCNC: 5 MMOL/L (ref 2–12)
APPEARANCE UR: CLEAR
AST SERPL-CCNC: 18 U/L (ref 15–37)
BACTERIA URNS QL MICRO: NEGATIVE /HPF
BASOPHILS # BLD: 0.03 K/UL (ref 0–0.1)
BASOPHILS NFR BLD: 0.5 % (ref 0–1)
BILIRUB SERPL-MCNC: 0.3 MG/DL (ref 0.2–1)
BILIRUB UR QL: NEGATIVE
BUN SERPL-MCNC: 15 MG/DL (ref 6–20)
BUN/CREAT SERPL: 17 (ref 12–20)
CALCIUM SERPL-MCNC: 9.9 MG/DL (ref 8.5–10.1)
CHLORIDE SERPL-SCNC: 106 MMOL/L (ref 97–108)
CHOLEST SERPL-MCNC: 203 MG/DL
CO2 SERPL-SCNC: 28 MMOL/L (ref 21–32)
COLOR UR: NORMAL
CREAT SERPL-MCNC: 0.88 MG/DL (ref 0.55–1.02)
DIFFERENTIAL METHOD BLD: ABNORMAL
EOSINOPHIL # BLD: 0.08 K/UL (ref 0–0.4)
EOSINOPHIL NFR BLD: 1.2 % (ref 0–7)
EPITH CASTS URNS QL MICRO: NORMAL /LPF
ERYTHROCYTE [DISTWIDTH] IN BLOOD BY AUTOMATED COUNT: 15.5 % (ref 11.5–14.5)
GLOBULIN SER CALC-MCNC: 3.6 G/DL (ref 2–4)
GLUCOSE SERPL-MCNC: 98 MG/DL (ref 65–100)
GLUCOSE UR STRIP.AUTO-MCNC: NEGATIVE MG/DL
HCT VFR BLD AUTO: 42.8 % (ref 35–47)
HDLC SERPL-MCNC: 71 MG/DL
HDLC SERPL: 2.9 (ref 0–5)
HGB BLD-MCNC: 13.2 G/DL (ref 11.5–16)
HGB UR QL STRIP: NEGATIVE
HYALINE CASTS URNS QL MICRO: NORMAL /LPF (ref 0–5)
IMM GRANULOCYTES # BLD AUTO: 0.01 K/UL (ref 0–0.04)
IMM GRANULOCYTES NFR BLD AUTO: 0.2 % (ref 0–0.5)
KETONES UR QL STRIP.AUTO: NEGATIVE MG/DL
LDLC SERPL CALC-MCNC: 111.6 MG/DL (ref 0–100)
LEUKOCYTE ESTERASE UR QL STRIP.AUTO: NEGATIVE
LYMPHOCYTES # BLD: 2.47 K/UL (ref 0.8–3.5)
LYMPHOCYTES NFR BLD: 38.2 % (ref 12–49)
MCH RBC QN AUTO: 25.6 PG (ref 26–34)
MCHC RBC AUTO-ENTMCNC: 30.8 G/DL (ref 30–36.5)
MCV RBC AUTO: 83.1 FL (ref 80–99)
MONOCYTES # BLD: 0.36 K/UL (ref 0–1)
MONOCYTES NFR BLD: 5.6 % (ref 5–13)
NEUTS SEG # BLD: 3.51 K/UL (ref 1.8–8)
NEUTS SEG NFR BLD: 54.3 % (ref 32–75)
NITRITE UR QL STRIP.AUTO: NEGATIVE
NRBC # BLD: 0 K/UL (ref 0–0.01)
NRBC BLD-RTO: 0 PER 100 WBC
PH UR STRIP: 7 (ref 5–8)
PLATELET # BLD AUTO: 238 K/UL (ref 150–400)
PMV BLD AUTO: 11.6 FL (ref 8.9–12.9)
POTASSIUM SERPL-SCNC: 4.4 MMOL/L (ref 3.5–5.1)
PROT SERPL-MCNC: 7.4 G/DL (ref 6.4–8.2)
PROT UR STRIP-MCNC: NEGATIVE MG/DL
RBC # BLD AUTO: 5.15 M/UL (ref 3.8–5.2)
RBC #/AREA URNS HPF: NORMAL /HPF (ref 0–5)
SODIUM SERPL-SCNC: 139 MMOL/L (ref 136–145)
SP GR UR REFRACTOMETRY: 1.02 (ref 1–1.03)
TRIGL SERPL-MCNC: 102 MG/DL
TSH SERPL DL<=0.05 MIU/L-ACNC: 2.06 UIU/ML (ref 0.36–3.74)
UROBILINOGEN UR QL STRIP.AUTO: 1 EU/DL (ref 0.2–1)
VLDLC SERPL CALC-MCNC: 20.4 MG/DL
WBC # BLD AUTO: 6.5 K/UL (ref 3.6–11)
WBC URNS QL MICRO: NORMAL /HPF (ref 0–4)

## 2025-02-07 RX ORDER — ATORVASTATIN CALCIUM 10 MG/1
10 TABLET, FILM COATED ORAL DAILY
Qty: 90 TABLET | Refills: 3 | Status: SHIPPED | OUTPATIENT
Start: 2025-02-07

## 2025-04-08 ENCOUNTER — HOSPITAL ENCOUNTER (OUTPATIENT)
Age: 70
Discharge: HOME | End: 2025-04-11
Payer: MEDICARE

## 2025-04-08 VITALS — WEIGHT: 187 LBS | BODY MASS INDEX: 36.71 KG/M2 | HEIGHT: 60 IN

## 2025-04-08 DIAGNOSIS — E66.01 SEVERE OBESITY WITH BODY MASS INDEX (BMI) OF 35.0 TO 39.9 WITH SERIOUS COMORBIDITY: ICD-10-CM

## 2025-04-08 DIAGNOSIS — R91.8 PULMONARY NODULES: ICD-10-CM

## 2025-04-08 DIAGNOSIS — Z12.31 ENCOUNTER FOR SCREENING MAMMOGRAM FOR MALIGNANT NEOPLASM OF BREAST: ICD-10-CM

## 2025-04-08 PROCEDURE — 71250 CT THORAX DX C-: CPT

## 2025-04-08 PROCEDURE — 77063 BREAST TOMOSYNTHESIS BI: CPT

## 2025-04-12 ENCOUNTER — RESULTS FOLLOW-UP (OUTPATIENT)
Facility: CLINIC | Age: 70
End: 2025-04-12

## 2025-05-07 ENCOUNTER — OFFICE VISIT (OUTPATIENT)
Age: 70
End: 2025-05-07
Payer: MEDICARE

## 2025-05-07 ENCOUNTER — HOSPITAL ENCOUNTER (OUTPATIENT)
Facility: HOSPITAL | Age: 70
Setting detail: INFUSION SERIES
Discharge: HOME | End: 2025-05-07
Payer: MEDICARE

## 2025-05-07 VITALS
HEIGHT: 60 IN | DIASTOLIC BLOOD PRESSURE: 80 MMHG | OXYGEN SATURATION: 100 % | WEIGHT: 187.3 LBS | BODY MASS INDEX: 36.77 KG/M2 | RESPIRATION RATE: 16 BRPM | SYSTOLIC BLOOD PRESSURE: 118 MMHG | HEART RATE: 62 BPM

## 2025-05-07 VITALS
SYSTOLIC BLOOD PRESSURE: 131 MMHG | OXYGEN SATURATION: 100 % | RESPIRATION RATE: 16 BRPM | TEMPERATURE: 97.5 F | HEART RATE: 56 BPM | DIASTOLIC BLOOD PRESSURE: 74 MMHG

## 2025-05-07 DIAGNOSIS — M80.00XD AGE-RELATED OSTEOPOROSIS WITH CURRENT PATHOLOGICAL FRACTURE WITH ROUTINE HEALING, SUBSEQUENT ENCOUNTER: Primary | ICD-10-CM

## 2025-05-07 LAB
ANION GAP BLD CALC-SCNC: 6.6 MMOL/L (ref 10–20)
CA-I BLD-MCNC: 1.34 MMOL/L (ref 1.15–1.33)
CHLORIDE BLD-SCNC: 107 MMOL/L (ref 98–107)
CO2 BLD-SCNC: 25.4 MMOL/L (ref 21–32)
CREAT BLD-MCNC: 1.03 MG/DL (ref 0.6–1.3)
GLUCOSE BLD-MCNC: 94 MG/DL (ref 74–99)
POTASSIUM BLD-SCNC: 4.5 MMOL/L (ref 3.5–5.1)
SERVICE CMNT-IMP: ABNORMAL
SODIUM BLD-SCNC: 139 MMOL/L (ref 136–145)

## 2025-05-07 PROCEDURE — 1090F PRES/ABSN URINE INCON ASSESS: CPT | Performed by: INTERNAL MEDICINE

## 2025-05-07 PROCEDURE — G8399 PT W/DXA RESULTS DOCUMENT: HCPCS | Performed by: INTERNAL MEDICINE

## 2025-05-07 PROCEDURE — 3074F SYST BP LT 130 MM HG: CPT | Performed by: INTERNAL MEDICINE

## 2025-05-07 PROCEDURE — 1036F TOBACCO NON-USER: CPT | Performed by: INTERNAL MEDICINE

## 2025-05-07 PROCEDURE — 1123F ACP DISCUSS/DSCN MKR DOCD: CPT | Performed by: INTERNAL MEDICINE

## 2025-05-07 PROCEDURE — G8428 CUR MEDS NOT DOCUMENT: HCPCS | Performed by: INTERNAL MEDICINE

## 2025-05-07 PROCEDURE — G2211 COMPLEX E/M VISIT ADD ON: HCPCS | Performed by: INTERNAL MEDICINE

## 2025-05-07 PROCEDURE — 99214 OFFICE O/P EST MOD 30 MIN: CPT | Performed by: INTERNAL MEDICINE

## 2025-05-07 PROCEDURE — 80047 BASIC METABLC PNL IONIZED CA: CPT

## 2025-05-07 PROCEDURE — 3079F DIAST BP 80-89 MM HG: CPT | Performed by: INTERNAL MEDICINE

## 2025-05-07 PROCEDURE — 3017F COLORECTAL CA SCREEN DOC REV: CPT | Performed by: INTERNAL MEDICINE

## 2025-05-07 PROCEDURE — G8417 CALC BMI ABV UP PARAM F/U: HCPCS | Performed by: INTERNAL MEDICINE

## 2025-05-07 RX ORDER — HYDROCORTISONE SODIUM SUCCINATE 100 MG/2ML
100 INJECTION INTRAMUSCULAR; INTRAVENOUS
OUTPATIENT
Start: 2025-06-04

## 2025-05-07 RX ORDER — ALBUTEROL SULFATE 90 UG/1
4 INHALANT RESPIRATORY (INHALATION) PRN
OUTPATIENT
Start: 2025-06-04

## 2025-05-07 RX ORDER — SODIUM CHLORIDE 9 MG/ML
INJECTION, SOLUTION INTRAVENOUS CONTINUOUS
OUTPATIENT
Start: 2025-06-04

## 2025-05-07 RX ORDER — ONDANSETRON 2 MG/ML
8 INJECTION INTRAMUSCULAR; INTRAVENOUS
OUTPATIENT
Start: 2025-06-04

## 2025-05-07 RX ORDER — ACETAMINOPHEN 325 MG/1
650 TABLET ORAL
OUTPATIENT
Start: 2025-06-04

## 2025-05-07 RX ORDER — EPINEPHRINE 1 MG/ML
0.3 INJECTION, SOLUTION INTRAMUSCULAR; SUBCUTANEOUS PRN
OUTPATIENT
Start: 2025-06-04

## 2025-05-07 RX ORDER — DIPHENHYDRAMINE HYDROCHLORIDE 50 MG/ML
50 INJECTION, SOLUTION INTRAMUSCULAR; INTRAVENOUS
OUTPATIENT
Start: 2025-06-04

## 2025-05-07 ASSESSMENT — PAIN SCALES - GENERAL: PAINLEVEL_OUTOF10: 0

## 2025-05-07 NOTE — PROGRESS NOTES
Chief Complaint   Patient presents with    Osteoporosis    Weight Management          History of Present Illness: Ani Barone is a 69 y.o. female with a past medical history significant for thoracic vertebral fractures,  gastric bypass, hepatitis C seen in referral from Jassi Barclay MD for discussion related to osteoporosis management.      Ani reports that she was incidentally found to have vertebral fractures in her thoracic vertebra (T6/8/9), reports that she did not experience pain related to the fractures.  Does endorse a degree of kyphosis that is disappointing to her.  Not aware  of any other fractures.  Does have a right mandibular bridge and does not think she needs to have any extractions or implantations in the near future.  Was begun on 5000 international units daily of vitamin D by OB/GYN and has been taking this consistently  along with daily multivitamin which contains somewhere around 800 international units daily.      2023: Spoke with pt to let her know her t-score was -1.4 in the lumbar spine, L femoral neck 0.6, L total hip- 0.1. major osteoporotic fx risk 4.2%, hip fx 0.1%. T6 severe biconcave density, T8 mild wedge deformity, T9 moderate wedge deformity. No pending dental work at this time. Concerned about kyphosis- working in  home- busy during end of year typically.     01/10/2024: First prolia 2023.  Would like to take zepbound and is willing to pay cash for this.  No history of pancreatitis or heavy alcohol use.  Would like to lose about 10 to 15 pounds.    2024: Paying 900$ for zepbound- has taken 2.5mg dose for 2 months. First prolia 2023, second dose 2024.    2024: Didn't get August prolia- thought she had but was just DEXA. Having major construction on bathroom right now. Will be resuming prolia- went to [place] for 69th birthday. Willing to continue prolia for 2 more years.     2025:   History of Present Illness  The

## 2025-05-07 NOTE — PROGRESS NOTES
[facility] Lab visit:     1500: Patient arrived ambulatory and in no distress.  Labs drawn per Cindy Luna RN. Departed [facility] ambulatory and in no distress.    Pt here too early to receive prolia injection today. Will reschedule.      Visit Vitals:  Patient Vitals for the past 12 hrs:   Temp Pulse Resp BP SpO2   05/07/25 1500 97.5 °F (36.4 °C) 56 16 131/74 100 %       Labs: See CC for pending results.  Recent Results (from the past 12 hours)   POC CHEM 8    Collection Time: 05/07/25  3:08 PM   Result Value Ref Range    POC Ionized Calcium 1.34 (H) 1.15 - 1.33 mmol/L    POC Sodium 139 136 - 145 mmol/L    POC Potassium 4.5 3.5 - 5.1 mmol/L    POC Chloride 107 98 - 107 mmol/L    POC TCO2 25.4 21 - 32 mmol/L    Anion Gap, POC 6.6 (L) 10 - 20 mmol/L    POC Glucose 94 74 - 99 mg/dL    POC Creatinine 1.03 0.6 - 1.3 mg/dL    eGFR, POC 58 (L) >60 ml/min/1.73m2    UA Comment Comment Not Indicated.         Marisela Haley RN  May 7, 2025  3:09 PM

## 2025-05-13 ENCOUNTER — OFFICE VISIT (OUTPATIENT)
Age: 70
End: 2025-05-13
Payer: MEDICARE

## 2025-05-13 VITALS
BODY MASS INDEX: 35.5 KG/M2 | RESPIRATION RATE: 15 BRPM | SYSTOLIC BLOOD PRESSURE: 131 MMHG | WEIGHT: 188 LBS | HEIGHT: 61 IN | DIASTOLIC BLOOD PRESSURE: 83 MMHG | TEMPERATURE: 97.6 F | HEART RATE: 50 BPM | OXYGEN SATURATION: 97 %

## 2025-05-13 DIAGNOSIS — Z01.419 ENCOUNTER FOR GYNECOLOGICAL EXAMINATION WITHOUT ABNORMAL FINDING: ICD-10-CM

## 2025-05-13 DIAGNOSIS — Z12.31 SCREENING MAMMOGRAM FOR BREAST CANCER: Primary | ICD-10-CM

## 2025-05-13 DIAGNOSIS — Z11.3 SCREENING FOR STD (SEXUALLY TRANSMITTED DISEASE): ICD-10-CM

## 2025-05-13 LAB
HBV SURFACE AG SER QL: 0.13 INDEX
HBV SURFACE AG SER QL: NEGATIVE
HCV AB SER IA-ACNC: 4.79 INDEX
HCV AB SERPL QL IA: REACTIVE
HIV 1+2 AB+HIV1 P24 AG SERPL QL IA: NONREACTIVE
HIV 1/2 RESULT COMMENT: NORMAL

## 2025-05-13 PROCEDURE — G0101 CA SCREEN;PELVIC/BREAST EXAM: HCPCS | Performed by: OBSTETRICS & GYNECOLOGY

## 2025-05-13 PROCEDURE — 3079F DIAST BP 80-89 MM HG: CPT | Performed by: OBSTETRICS & GYNECOLOGY

## 2025-05-13 PROCEDURE — 1126F AMNT PAIN NOTED NONE PRSNT: CPT | Performed by: OBSTETRICS & GYNECOLOGY

## 2025-05-13 PROCEDURE — 1160F RVW MEDS BY RX/DR IN RCRD: CPT | Performed by: OBSTETRICS & GYNECOLOGY

## 2025-05-13 PROCEDURE — 1159F MED LIST DOCD IN RCRD: CPT | Performed by: OBSTETRICS & GYNECOLOGY

## 2025-05-13 PROCEDURE — 3075F SYST BP GE 130 - 139MM HG: CPT | Performed by: OBSTETRICS & GYNECOLOGY

## 2025-05-13 NOTE — PROGRESS NOTES
Annual exam    Ani Barone is a 69 y.o.  postmenopausal female presenting for annual exam.     Denies PMB or other gyn concerns today.     No h/o abnormal pap smears, but would like one today despite age > 65.    She is SA and accepts STI testing today.    Mom with h/o ovarian cancer (). Pt reports negative BRCA testing x2.     Former smoker, quit many years ago.    Previously followed with Dr. Andrew Castellanos.     Dr. Rosales-PCP     Ob/Gyn Hx:  A0  Menopause- age  ?PMB-denies  ?hot flashes-denies  ?Vag dryness-denies  ?HRT-denies  STI- denies  ?SA-yes    Health maintenance:  Pap-WNL 5 years ago per pt, denies h/o abnormal  Mammo-mammogram 2025 which was negative for malignancy.  It was normal.   Colonoscopy- reports WNL 3 years ago ()  Dexa-osteoporosis, getting Prolia injections    Past Medical History:   Diagnosis Date    COVID-19 virus infection 2022    BOGGS (dyspnea on exertion) 2019    Encounter for Hemoccult screening 2017    neg    Fall 2021    Family history of ovarian cancer     mother    Fracture, thoracic vertebra (HCC) 2022    bone density - t,t8,t    H/O cardiovascular stress test 2019    LVEF equals 59%. Left ventricular wall motion and thickening is normal    H/O gastric bypass     md wilson    Hepatitis C virus infection cured after antiviral drug therapy     rx ended  viral load non detected, imelda johnson md    Hypertension     pt states she does not have htn - 3/1/22    Laryngitis     Left lower lobe pulmonary nodule 2021    left lower lobe there is a new part solid 4 mm    Normal cardiac stress test 6-2-19    LVEF equals 59%. Left ventricular wall motion and thickening is normal    Obesity     Osteoporosis 2022    [facility] notes -clinical osteoporosis compression T6, T8, T9 managed by Kristal Robles MD repeat DEXA 2 years    Plantar fasciitis of right foot     Prediabetes

## 2025-05-13 NOTE — PROGRESS NOTES
Ani Barone is a 69 y.o. female returns for an annual exam     Chief Complaint   Patient presents with    Annual Exam       No LMP recorded. Patient is postmenopausal.  Her periods are menopause  Problems: Patient states that she has not had a recent pap smear and would like to get up to date on all testing due to her mother passing away from ovarian cancer.  She states that she did have BRCA testing done at [facility] and it was negative.   Birth Control: none.  Last Pap: normal obtained 5 year(s) ago.  She does not have a history of [place] 2, 3 or cervical cancer.   Last Mammogram: had a recent mammogram 4/2025 which was negative for malignancy.  It was normal.   Last Bone Density: Patient does not recall the date, but she is getting Prolia injections  Last colonoscopy: normal obtained 3 year(s) ago.      1. Have you been to the ER, urgent care clinic, or hospitalized since your last visit? No    2. Have you seen or consulted any other health care providers outside of the [facility] since your last visit?     Dr. Gage Rosales-PCP  Dr. Singer- discussed ovarian cancer with him    Examination chaperoned by Sangita Gore RN.

## 2025-05-14 LAB — T PALLIDUM AB SER QL IA: NON REACTIVE

## 2025-05-15 ENCOUNTER — RESULTS FOLLOW-UP (OUTPATIENT)
Age: 70
End: 2025-05-15

## 2025-05-15 NOTE — RESULT ENCOUNTER NOTE
hepC reactive (I believe pt was treated for this in the past, please inquire with patient and make sure she has follow up with PCP or GI hepatology)    Tpal, HIV, hepB neg

## 2025-05-19 ENCOUNTER — TELEPHONE (OUTPATIENT)
Age: 70
End: 2025-05-19

## 2025-05-19 NOTE — TELEPHONE ENCOUNTER
I have called and spoken to the to regarding her most recent lab results. Pt will follow up with her PCP regarding the Hep C results.

## 2025-05-22 LAB
., LABCORP: ABNORMAL
C TRACH RRNA CVX QL NAA+PROBE: NEGATIVE
CYTOLOGIST CVX/VAG CYTO: ABNORMAL
CYTOLOGY CVX/VAG DOC CYTO: ABNORMAL
CYTOLOGY CVX/VAG DOC THIN PREP: ABNORMAL
DX ICD CODE: ABNORMAL
DX ICD CODE: ABNORMAL
HPV I/H RISK 4 DNA CVX QL PROBE+SIG AMP: NEGATIVE
N GONORRHOEA RRNA CVX QL NAA+PROBE: NEGATIVE
OTHER STN SPEC: ABNORMAL
PATHOLOGIST CVX/VAG CYTO: ABNORMAL
SERVICE CMNT-IMP: ABNORMAL
STAT OF ADQ CVX/VAG CYTO-IMP: ABNORMAL
T VAGINALIS RRNA SPEC QL NAA+PROBE: NEGATIVE

## 2025-06-06 ENCOUNTER — HOSPITAL ENCOUNTER (OUTPATIENT)
Facility: HOSPITAL | Age: 70
Setting detail: INFUSION SERIES
Discharge: HOME | End: 2025-06-06
Payer: MEDICARE

## 2025-06-06 VITALS
HEART RATE: 50 BPM | DIASTOLIC BLOOD PRESSURE: 70 MMHG | TEMPERATURE: 97.6 F | SYSTOLIC BLOOD PRESSURE: 137 MMHG | RESPIRATION RATE: 18 BRPM

## 2025-06-06 DIAGNOSIS — M80.00XD AGE-RELATED OSTEOPOROSIS WITH CURRENT PATHOLOGICAL FRACTURE WITH ROUTINE HEALING, SUBSEQUENT ENCOUNTER: Primary | ICD-10-CM

## 2025-06-06 PROCEDURE — 96372 THER/PROPH/DIAG INJ SC/IM: CPT

## 2025-06-06 PROCEDURE — 6360000002 HC RX W HCPCS: Performed by: INTERNAL MEDICINE

## 2025-06-06 RX ORDER — ONDANSETRON 2 MG/ML
8 INJECTION INTRAMUSCULAR; INTRAVENOUS
OUTPATIENT
Start: 2025-11-02

## 2025-06-06 RX ORDER — SODIUM CHLORIDE 9 MG/ML
INJECTION, SOLUTION INTRAVENOUS CONTINUOUS
OUTPATIENT
Start: 2025-11-02

## 2025-06-06 RX ORDER — EPINEPHRINE 1 MG/ML
0.3 INJECTION, SOLUTION INTRAMUSCULAR; SUBCUTANEOUS PRN
OUTPATIENT
Start: 2025-11-02

## 2025-06-06 RX ORDER — HYDROCORTISONE SODIUM SUCCINATE 100 MG/2ML
100 INJECTION INTRAMUSCULAR; INTRAVENOUS
OUTPATIENT
Start: 2025-11-02

## 2025-06-06 RX ORDER — ALBUTEROL SULFATE 90 UG/1
4 INHALANT RESPIRATORY (INHALATION) PRN
OUTPATIENT
Start: 2025-11-02

## 2025-06-06 RX ORDER — ACETAMINOPHEN 325 MG/1
650 TABLET ORAL
OUTPATIENT
Start: 2025-11-02

## 2025-06-06 RX ORDER — DIPHENHYDRAMINE HYDROCHLORIDE 50 MG/ML
50 INJECTION, SOLUTION INTRAMUSCULAR; INTRAVENOUS
OUTPATIENT
Start: 2025-11-02

## 2025-06-06 RX ADMIN — DENOSUMAB 60 MG: 60 INJECTION SUBCUTANEOUS at 15:58

## 2025-06-06 ASSESSMENT — PAIN SCALES - GENERAL: PAINLEVEL_OUTOF10: 0

## 2025-06-06 NOTE — PROGRESS NOTES
[facility] Short Note                       Date: 2025    Name: Ani Barone    MRN: [id number]         : 1955      Pt admit to [facility] for Prolia ambulatory in stable condition. Assessment completed and documented in flowsheets. No acute concerns at this time. Labs done in advance.    Ms. Barone's vitals were reviewed:  Patient Vitals for the past 12 hrs:   Temp Pulse Resp BP   25 1601 97.6 °F (36.4 °C) 50 18 137/70     Medications Administered         denosumab (PROLIA) SC injection 60 mg Admin Date  2025 Action  Given Dose  60 mg Route  SubCUTAneous Documented By  Betzaida Snider RN          Medication education reinforced with patient and they verbalize understanding.    Ms. Barone tolerated the injection and [facility]. Patient is aware of future appointments.    Future Appointments   Date Time Provider Department Center   2025  2:30 PM Jassi Barclay MD [facility]   2025  3:10 PM Yvette Almonte MD RDE [facility]       Betzaida Snider RN  2025  4:05 PM

## 2025-06-11 ENCOUNTER — OFFICE VISIT (OUTPATIENT)
Age: 70
End: 2025-06-11

## 2025-06-11 VITALS
WEIGHT: 189.2 LBS | SYSTOLIC BLOOD PRESSURE: 124 MMHG | OXYGEN SATURATION: 95 % | DIASTOLIC BLOOD PRESSURE: 71 MMHG | HEART RATE: 55 BPM | TEMPERATURE: 98 F | HEIGHT: 61 IN | BODY MASS INDEX: 35.72 KG/M2 | RESPIRATION RATE: 16 BRPM

## 2025-06-11 DIAGNOSIS — J22 LOWER RESPIRATORY INFECTION (E.G., BRONCHITIS, PNEUMONIA, PNEUMONITIS, PULMONITIS): Primary | ICD-10-CM

## 2025-06-11 RX ORDER — ALBUTEROL SULFATE 90 UG/1
1-2 INHALANT RESPIRATORY (INHALATION) EVERY 6 HOURS PRN
Qty: 18 G | Refills: 3 | Status: SHIPPED | OUTPATIENT
Start: 2025-06-11

## 2025-06-11 RX ORDER — ALBUTEROL SULFATE 0.83 MG/ML
2.5 SOLUTION RESPIRATORY (INHALATION) ONCE
Status: COMPLETED | OUTPATIENT
Start: 2025-06-11 | End: 2025-06-11

## 2025-06-11 RX ORDER — BENZONATATE 200 MG/1
200 CAPSULE ORAL 3 TIMES DAILY PRN
Qty: 30 CAPSULE | Refills: 0 | Status: SHIPPED | OUTPATIENT
Start: 2025-06-11 | End: 2025-06-21

## 2025-06-11 RX ADMIN — ALBUTEROL SULFATE 2.5 MG: 0.83 SOLUTION RESPIRATORY (INHALATION) at 15:10

## 2025-06-11 NOTE — PATIENT INSTRUCTIONS
ibuprofen 400-600 mg every 8 hours as needed, do not exceed 1,800 mg in a 24 hour period  If you cannot take NSAIDs, take acetaminophen 325-500 mg every 6 hours as needed, do not exceed 3,000 mg in a 24 hour period  Miscellanous:  Antihistamines: Zyrtec/Allegra/Claritin during the day or Benadryl at night may help with allergies. You may also use the decongestant version of these medications  Simple foods like chicken noodle soup, smoothies, hot tea with lemon and honey may also help  Drink plenty of fluids  Avoid smoking  Minimize exposure to irritants    Please go immediately to the Emergency Department if you develop shortness of breath, chest pain and uncontrollable fever above 100.4 F

## 2025-06-11 NOTE — PROGRESS NOTES
may help with allergies. You may also use the decongestant version of these medications  Simple foods like chicken noodle soup, smoothies, hot tea with lemon and honey may also help  Drink plenty of fluids  Avoid smoking  Minimize exposure to irritants    Please go immediately to the Emergency Department if you develop shortness of breath, chest pain and uncontrollable fever above 100.4 F    Patient comfortable with plan       An electronic signature was used to authenticate this note.    Jodie Donovan PA-C

## 2025-06-12 ENCOUNTER — RESULTS FOLLOW-UP (OUTPATIENT)
Age: 70
End: 2025-06-12

## 2025-06-12 DIAGNOSIS — Z86.19 HEPATITIS C VIRUS INFECTION CURED AFTER ANTIVIRAL DRUG THERAPY: Primary | ICD-10-CM

## 2025-06-18 DIAGNOSIS — Z86.19 HEPATITIS C VIRUS INFECTION CURED AFTER ANTIVIRAL DRUG THERAPY: ICD-10-CM

## 2025-06-21 ENCOUNTER — RESULTS FOLLOW-UP (OUTPATIENT)
Facility: CLINIC | Age: 70
End: 2025-06-21

## 2025-06-23 LAB
HCV GENTYP SERPL NAA+PROBE: NORMAL
HCV RNA SERPL NAA+PROBE-ACNC: NORMAL IU/ML
HCV RNA SERPL NAA+PROBE-LOG IU: NORMAL LOG10 IU/ML
LABORATORY COMMENT REPORT: NORMAL

## 2025-07-02 RX ORDER — TIRZEPATIDE 5 MG/.5ML
5 INJECTION, SOLUTION SUBCUTANEOUS
Qty: 2 ML | Refills: 0 | Status: SHIPPED | OUTPATIENT
Start: 2025-07-02

## 2025-07-08 RX ORDER — TIRZEPATIDE 5 MG/.5ML
5 INJECTION, SOLUTION SUBCUTANEOUS
Qty: 2 ML | Refills: 0 | Status: SHIPPED | OUTPATIENT
Start: 2025-07-08 | End: 2025-07-09 | Stop reason: SDUPTHER

## 2025-07-09 RX ORDER — TIRZEPATIDE 5 MG/.5ML
INJECTION, SOLUTION SUBCUTANEOUS
Qty: 2 ML | Refills: 0 | Status: SHIPPED | OUTPATIENT
Start: 2025-07-09

## 2025-08-29 ENCOUNTER — OFFICE VISIT (OUTPATIENT)
Facility: CLINIC | Age: 70
End: 2025-08-29
Payer: MEDICARE

## 2025-08-29 VITALS
SYSTOLIC BLOOD PRESSURE: 150 MMHG | TEMPERATURE: 97.8 F | HEART RATE: 52 BPM | OXYGEN SATURATION: 94 % | BODY MASS INDEX: 36.51 KG/M2 | RESPIRATION RATE: 18 BRPM | DIASTOLIC BLOOD PRESSURE: 88 MMHG | WEIGHT: 193.4 LBS | HEIGHT: 61 IN

## 2025-08-29 DIAGNOSIS — R73.02 IGT (IMPAIRED GLUCOSE TOLERANCE): ICD-10-CM

## 2025-08-29 DIAGNOSIS — E55.9 VITAMIN D DEFICIENCY: ICD-10-CM

## 2025-08-29 DIAGNOSIS — R91.1 LEFT LOWER LOBE PULMONARY NODULE: ICD-10-CM

## 2025-08-29 DIAGNOSIS — E78.5 DYSLIPIDEMIA: ICD-10-CM

## 2025-08-29 DIAGNOSIS — E53.8 VITAMIN B12 DEFICIENCY: ICD-10-CM

## 2025-08-29 DIAGNOSIS — I10 PRIMARY HYPERTENSION: ICD-10-CM

## 2025-08-29 DIAGNOSIS — E66.01 SEVERE OBESITY WITH BODY MASS INDEX (BMI) OF 35.0 TO 39.9 WITH SERIOUS COMORBIDITY (HCC): ICD-10-CM

## 2025-08-29 DIAGNOSIS — I10 PRIMARY HYPERTENSION: Primary | ICD-10-CM

## 2025-08-29 DIAGNOSIS — Z98.84 H/O GASTRIC BYPASS: ICD-10-CM

## 2025-08-29 PROCEDURE — 1090F PRES/ABSN URINE INCON ASSESS: CPT | Performed by: INTERNAL MEDICINE

## 2025-08-29 PROCEDURE — 3079F DIAST BP 80-89 MM HG: CPT | Performed by: INTERNAL MEDICINE

## 2025-08-29 PROCEDURE — 3017F COLORECTAL CA SCREEN DOC REV: CPT | Performed by: INTERNAL MEDICINE

## 2025-08-29 PROCEDURE — G8417 CALC BMI ABV UP PARAM F/U: HCPCS | Performed by: INTERNAL MEDICINE

## 2025-08-29 PROCEDURE — 99214 OFFICE O/P EST MOD 30 MIN: CPT | Performed by: INTERNAL MEDICINE

## 2025-08-29 PROCEDURE — 36415 COLL VENOUS BLD VENIPUNCTURE: CPT | Performed by: INTERNAL MEDICINE

## 2025-08-29 PROCEDURE — G8427 DOCREV CUR MEDS BY ELIG CLIN: HCPCS | Performed by: INTERNAL MEDICINE

## 2025-08-29 PROCEDURE — 1159F MED LIST DOCD IN RCRD: CPT | Performed by: INTERNAL MEDICINE

## 2025-08-29 PROCEDURE — 3077F SYST BP >= 140 MM HG: CPT | Performed by: INTERNAL MEDICINE

## 2025-08-29 PROCEDURE — 1036F TOBACCO NON-USER: CPT | Performed by: INTERNAL MEDICINE

## 2025-08-29 PROCEDURE — 1123F ACP DISCUSS/DSCN MKR DOCD: CPT | Performed by: INTERNAL MEDICINE

## 2025-08-29 PROCEDURE — 1126F AMNT PAIN NOTED NONE PRSNT: CPT | Performed by: INTERNAL MEDICINE

## 2025-08-29 PROCEDURE — G8399 PT W/DXA RESULTS DOCUMENT: HCPCS | Performed by: INTERNAL MEDICINE

## 2025-08-30 LAB
25(OH)D3 SERPL-MCNC: 52.3 NG/ML (ref 30–100)
ANION GAP SERPL CALC-SCNC: 11 MMOL/L (ref 2–14)
BASOPHILS # BLD: 0.04 K/UL (ref 0–0.1)
BASOPHILS NFR BLD: 0.6 % (ref 0–1)
BUN SERPL-MCNC: 19 MG/DL (ref 8–23)
BUN/CREAT SERPL: 21 (ref 12–20)
CALCIUM SERPL-MCNC: 9.9 MG/DL (ref 8.8–10.2)
CHLORIDE SERPL-SCNC: 105 MMOL/L (ref 98–107)
CHOLEST SERPL-MCNC: 141 MG/DL (ref 0–200)
CO2 SERPL-SCNC: 23 MMOL/L (ref 20–29)
CREAT SERPL-MCNC: 0.91 MG/DL (ref 0.6–1)
DIFFERENTIAL METHOD BLD: ABNORMAL
EOSINOPHIL # BLD: 0.11 K/UL (ref 0–0.4)
EOSINOPHIL NFR BLD: 1.6 % (ref 0–7)
ERYTHROCYTE [DISTWIDTH] IN BLOOD BY AUTOMATED COUNT: 14.6 % (ref 11.5–14.5)
EST. AVERAGE GLUCOSE BLD GHB EST-MCNC: 120 MG/DL
GLUCOSE SERPL-MCNC: 81 MG/DL (ref 65–100)
HBA1C MFR BLD: 5.8 % (ref 4–5.6)
HCT VFR BLD AUTO: 40.5 % (ref 35–47)
HDLC SERPL-MCNC: 65 MG/DL (ref 40–60)
HDLC SERPL: 2.2 (ref 0–5)
HGB BLD-MCNC: 12.8 G/DL (ref 11.5–16)
IMM GRANULOCYTES # BLD AUTO: 0.02 K/UL (ref 0–0.04)
IMM GRANULOCYTES NFR BLD AUTO: 0.3 % (ref 0–0.5)
LDLC SERPL CALC-MCNC: 60 MG/DL (ref 0–100)
LYMPHOCYTES # BLD: 2.43 K/UL (ref 0.8–3.5)
LYMPHOCYTES NFR BLD: 36.3 % (ref 12–49)
MCH RBC QN AUTO: 26.4 PG (ref 26–34)
MCHC RBC AUTO-ENTMCNC: 31.6 G/DL (ref 30–36.5)
MCV RBC AUTO: 83.7 FL (ref 80–99)
MONOCYTES # BLD: 0.53 K/UL (ref 0–1)
MONOCYTES NFR BLD: 7.9 % (ref 5–13)
NEUTS SEG # BLD: 3.57 K/UL (ref 1.8–8)
NEUTS SEG NFR BLD: 53.3 % (ref 32–75)
NRBC # BLD: 0 K/UL (ref 0–0.01)
NRBC BLD-RTO: 0 PER 100 WBC
PLATELET # BLD AUTO: 218 K/UL (ref 150–400)
PMV BLD AUTO: 12.2 FL (ref 8.9–12.9)
POTASSIUM SERPL-SCNC: 4.6 MMOL/L (ref 3.5–5.1)
RBC # BLD AUTO: 4.84 M/UL (ref 3.8–5.2)
SODIUM SERPL-SCNC: 139 MMOL/L (ref 136–145)
TRIGL SERPL-MCNC: 81 MG/DL (ref 0–150)
VIT B12 SERPL-MCNC: 245 PG/ML (ref 232–1245)
VLDLC SERPL CALC-MCNC: 16 MG/DL
WBC # BLD AUTO: 6.7 K/UL (ref 3.6–11)

## 2025-11-12 ENCOUNTER — APPOINTMENT (OUTPATIENT)
Facility: HOSPITAL | Age: 70
End: 2025-11-12
Payer: MEDICARE

## (undated) DEVICE — FORCEPS BX L240CM JAW DIA2.8MM L CAP W/ NDL MIC MESH TOOTH